# Patient Record
Sex: FEMALE | Race: WHITE | NOT HISPANIC OR LATINO | Employment: UNEMPLOYED | URBAN - METROPOLITAN AREA
[De-identification: names, ages, dates, MRNs, and addresses within clinical notes are randomized per-mention and may not be internally consistent; named-entity substitution may affect disease eponyms.]

---

## 2017-05-23 ENCOUNTER — HISTORICAL (OUTPATIENT)
Dept: INTERNAL MEDICINE | Facility: CLINIC | Age: 52
End: 2017-05-23

## 2022-04-07 ENCOUNTER — HISTORICAL (OUTPATIENT)
Dept: ADMINISTRATIVE | Facility: HOSPITAL | Age: 57
End: 2022-04-07

## 2022-04-24 VITALS
SYSTOLIC BLOOD PRESSURE: 127 MMHG | DIASTOLIC BLOOD PRESSURE: 81 MMHG | BODY MASS INDEX: 33.02 KG/M2 | HEIGHT: 62 IN | WEIGHT: 179.44 LBS

## 2022-10-13 ENCOUNTER — HOSPITAL ENCOUNTER (OUTPATIENT)
Facility: HOSPITAL | Age: 57
Discharge: HOME | DRG: 660 | End: 2022-10-15
Attending: STUDENT IN AN ORGANIZED HEALTH CARE EDUCATION/TRAINING PROGRAM | Admitting: INTERNAL MEDICINE
Payer: MEDICAID

## 2022-10-13 DIAGNOSIS — R11.2 NAUSEA AND VOMITING, UNSPECIFIED VOMITING TYPE: ICD-10-CM

## 2022-10-13 DIAGNOSIS — N20.0 KIDNEY STONE: ICD-10-CM

## 2022-10-13 DIAGNOSIS — R10.32 LEFT LOWER QUADRANT ABDOMINAL PAIN: ICD-10-CM

## 2022-10-13 DIAGNOSIS — K57.92 DIVERTICULITIS: Primary | ICD-10-CM

## 2022-10-13 LAB
ALBUMIN SERPL-MCNC: 4.2 GM/DL (ref 3.5–5)
ALBUMIN/GLOB SERPL: 1.2 RATIO (ref 1.1–2)
ALP SERPL-CCNC: 67 UNIT/L (ref 40–150)
ALT SERPL-CCNC: 24 UNIT/L (ref 0–55)
APPEARANCE UR: CLEAR
AST SERPL-CCNC: 22 UNIT/L (ref 5–34)
BACTERIA #/AREA URNS AUTO: ABNORMAL /HPF
BASOPHILS # BLD AUTO: 0.05 X10(3)/MCL (ref 0–0.2)
BASOPHILS NFR BLD AUTO: 0.4 %
BILIRUB UR QL STRIP.AUTO: NEGATIVE MG/DL
BILIRUBIN DIRECT+TOT PNL SERPL-MCNC: 0.6 MG/DL
BUN SERPL-MCNC: 12 MG/DL (ref 9.8–20.1)
CALCIUM SERPL-MCNC: 9.8 MG/DL (ref 8.4–10.2)
CHLORIDE SERPL-SCNC: 102 MMOL/L (ref 98–107)
CO2 SERPL-SCNC: 23 MMOL/L (ref 22–29)
COLOR UR AUTO: YELLOW
CREAT SERPL-MCNC: 0.63 MG/DL (ref 0.55–1.02)
EOSINOPHIL # BLD AUTO: 0.12 X10(3)/MCL (ref 0–0.9)
EOSINOPHIL NFR BLD AUTO: 1 %
ERYTHROCYTE [DISTWIDTH] IN BLOOD BY AUTOMATED COUNT: 12.7 % (ref 11.5–17)
GFR SERPLBLD CREATININE-BSD FMLA CKD-EPI: >60 MLS/MIN/1.73/M2
GLOBULIN SER-MCNC: 3.5 GM/DL (ref 2.4–3.5)
GLUCOSE SERPL-MCNC: 100 MG/DL (ref 74–100)
GLUCOSE UR QL STRIP.AUTO: NEGATIVE MG/DL
HCT VFR BLD AUTO: 43.2 % (ref 37–47)
HGB BLD-MCNC: 13.6 GM/DL (ref 12–16)
IMM GRANULOCYTES # BLD AUTO: 0.04 X10(3)/MCL (ref 0–0.04)
IMM GRANULOCYTES NFR BLD AUTO: 0.3 %
KETONES UR QL STRIP.AUTO: NEGATIVE MG/DL
LEUKOCYTE ESTERASE UR QL STRIP.AUTO: ABNORMAL UNIT/L
LIPASE SERPL-CCNC: 58 U/L
LYMPHOCYTES # BLD AUTO: 2.77 X10(3)/MCL (ref 0.6–4.6)
LYMPHOCYTES NFR BLD AUTO: 22.4 %
MCH RBC QN AUTO: 26.4 PG (ref 27–31)
MCHC RBC AUTO-ENTMCNC: 31.5 MG/DL (ref 33–36)
MCV RBC AUTO: 83.9 FL (ref 80–94)
MONOCYTES # BLD AUTO: 0.78 X10(3)/MCL (ref 0.1–1.3)
MONOCYTES NFR BLD AUTO: 6.3 %
NEUTROPHILS # BLD AUTO: 8.6 X10(3)/MCL (ref 2.1–9.2)
NEUTROPHILS NFR BLD AUTO: 69.6 %
NITRITE UR QL STRIP.AUTO: NEGATIVE
NRBC BLD AUTO-RTO: 0 %
PH UR STRIP.AUTO: 5.5 [PH]
PLATELET # BLD AUTO: 382 X10(3)/MCL (ref 130–400)
PMV BLD AUTO: 9 FL (ref 7.4–10.4)
POTASSIUM SERPL-SCNC: 4.4 MMOL/L (ref 3.5–5.1)
PROT SERPL-MCNC: 7.7 GM/DL (ref 6.4–8.3)
PROT UR QL STRIP.AUTO: NEGATIVE MG/DL
RBC # BLD AUTO: 5.15 X10(6)/MCL (ref 4.2–5.4)
RBC #/AREA URNS AUTO: <5 /HPF
RBC UR QL AUTO: ABNORMAL UNIT/L
SODIUM SERPL-SCNC: 137 MMOL/L (ref 136–145)
SP GR UR STRIP.AUTO: 1.01 (ref 1–1.03)
SQUAMOUS #/AREA URNS AUTO: <5 /HPF
UROBILINOGEN UR STRIP-ACNC: 0.2 MG/DL
WBC # SPEC AUTO: 12.4 X10(3)/MCL (ref 4.5–11.5)
WBC #/AREA URNS AUTO: 6 /HPF

## 2022-10-13 PROCEDURE — 36415 COLL VENOUS BLD VENIPUNCTURE: CPT | Performed by: EMERGENCY MEDICINE

## 2022-10-13 PROCEDURE — 96361 HYDRATE IV INFUSION ADD-ON: CPT

## 2022-10-13 PROCEDURE — 81001 URINALYSIS AUTO W/SCOPE: CPT | Performed by: EMERGENCY MEDICINE

## 2022-10-13 PROCEDURE — 83690 ASSAY OF LIPASE: CPT | Performed by: PHYSICIAN ASSISTANT

## 2022-10-13 PROCEDURE — 96376 TX/PRO/DX INJ SAME DRUG ADON: CPT

## 2022-10-13 PROCEDURE — 85025 COMPLETE CBC W/AUTO DIFF WBC: CPT | Performed by: EMERGENCY MEDICINE

## 2022-10-13 PROCEDURE — G0378 HOSPITAL OBSERVATION PER HR: HCPCS

## 2022-10-13 PROCEDURE — 96365 THER/PROPH/DIAG IV INF INIT: CPT

## 2022-10-13 PROCEDURE — 80053 COMPREHEN METABOLIC PANEL: CPT | Performed by: EMERGENCY MEDICINE

## 2022-10-13 PROCEDURE — 96375 TX/PRO/DX INJ NEW DRUG ADDON: CPT

## 2022-10-13 PROCEDURE — 96368 THER/DIAG CONCURRENT INF: CPT

## 2022-10-13 PROCEDURE — 99285 EMERGENCY DEPT VISIT HI MDM: CPT | Mod: 25

## 2022-10-13 PROCEDURE — 11000001 HC ACUTE MED/SURG PRIVATE ROOM

## 2022-10-14 ENCOUNTER — ANESTHESIA EVENT (OUTPATIENT)
Dept: SURGERY | Facility: HOSPITAL | Age: 57
DRG: 660 | End: 2022-10-14
Payer: MEDICAID

## 2022-10-14 ENCOUNTER — ANESTHESIA (OUTPATIENT)
Dept: SURGERY | Facility: HOSPITAL | Age: 57
DRG: 660 | End: 2022-10-14
Payer: MEDICAID

## 2022-10-14 PROBLEM — N20.0 KIDNEY STONE: Status: ACTIVE | Noted: 2022-10-14

## 2022-10-14 LAB — SARS-COV-2 RDRP RESP QL NAA+PROBE: NEGATIVE

## 2022-10-14 PROCEDURE — 25500020 PHARM REV CODE 255: Performed by: STUDENT IN AN ORGANIZED HEALTH CARE EDUCATION/TRAINING PROGRAM

## 2022-10-14 PROCEDURE — 11000001 HC ACUTE MED/SURG PRIVATE ROOM

## 2022-10-14 PROCEDURE — C2617 STENT, NON-COR, TEM W/O DEL: HCPCS | Performed by: UROLOGY

## 2022-10-14 PROCEDURE — S0030 INJECTION, METRONIDAZOLE: HCPCS | Performed by: PHYSICIAN ASSISTANT

## 2022-10-14 PROCEDURE — 36000707: Performed by: UROLOGY

## 2022-10-14 PROCEDURE — 63600175 PHARM REV CODE 636 W HCPCS

## 2022-10-14 PROCEDURE — 99900035 HC TECH TIME PER 15 MIN (STAT)

## 2022-10-14 PROCEDURE — 25000003 PHARM REV CODE 250

## 2022-10-14 PROCEDURE — 87635 SARS-COV-2 COVID-19 AMP PRB: CPT | Performed by: INTERNAL MEDICINE

## 2022-10-14 PROCEDURE — C1758 CATHETER, URETERAL: HCPCS | Performed by: UROLOGY

## 2022-10-14 PROCEDURE — C1769 GUIDE WIRE: HCPCS | Performed by: UROLOGY

## 2022-10-14 PROCEDURE — 00910 ANES TRANSURETHRAL PX NOS: CPT | Performed by: UROLOGY

## 2022-10-14 PROCEDURE — 63600175 PHARM REV CODE 636 W HCPCS: Performed by: STUDENT IN AN ORGANIZED HEALTH CARE EDUCATION/TRAINING PROGRAM

## 2022-10-14 PROCEDURE — 71000033 HC RECOVERY, INTIAL HOUR: Performed by: UROLOGY

## 2022-10-14 PROCEDURE — 94761 N-INVAS EAR/PLS OXIMETRY MLT: CPT

## 2022-10-14 PROCEDURE — 25000003 PHARM REV CODE 250: Performed by: PHYSICIAN ASSISTANT

## 2022-10-14 PROCEDURE — S0030 INJECTION, METRONIDAZOLE: HCPCS | Performed by: STUDENT IN AN ORGANIZED HEALTH CARE EDUCATION/TRAINING PROGRAM

## 2022-10-14 PROCEDURE — 25000003 PHARM REV CODE 250: Performed by: STUDENT IN AN ORGANIZED HEALTH CARE EDUCATION/TRAINING PROGRAM

## 2022-10-14 PROCEDURE — 25000242 PHARM REV CODE 250 ALT 637 W/ HCPCS: Performed by: PHYSICIAN ASSISTANT

## 2022-10-14 PROCEDURE — 96361 HYDRATE IV INFUSION ADD-ON: CPT

## 2022-10-14 PROCEDURE — S0030 INJECTION, METRONIDAZOLE: HCPCS

## 2022-10-14 PROCEDURE — 63600175 PHARM REV CODE 636 W HCPCS: Performed by: PHYSICIAN ASSISTANT

## 2022-10-14 PROCEDURE — G0378 HOSPITAL OBSERVATION PER HR: HCPCS

## 2022-10-14 PROCEDURE — 96366 THER/PROPH/DIAG IV INF ADDON: CPT

## 2022-10-14 PROCEDURE — 37000009 HC ANESTHESIA EA ADD 15 MINS: Performed by: UROLOGY

## 2022-10-14 PROCEDURE — 96376 TX/PRO/DX INJ SAME DRUG ADON: CPT | Mod: 59

## 2022-10-14 PROCEDURE — 36000706: Performed by: UROLOGY

## 2022-10-14 PROCEDURE — 94640 AIRWAY INHALATION TREATMENT: CPT

## 2022-10-14 PROCEDURE — 37000008 HC ANESTHESIA 1ST 15 MINUTES: Performed by: UROLOGY

## 2022-10-14 DEVICE — STENT SET URETERAL 6X26CM: Type: IMPLANTABLE DEVICE | Site: URETER | Status: FUNCTIONAL

## 2022-10-14 DEVICE — STENT SET URETERAL 6FR 22CM: Type: IMPLANTABLE DEVICE | Site: URETER | Status: FUNCTIONAL

## 2022-10-14 RX ORDER — PHENYLEPHRINE HCL IN 0.9% NACL 1 MG/10 ML
SYRINGE (ML) INTRAVENOUS
Status: DISCONTINUED | OUTPATIENT
Start: 2022-10-14 | End: 2022-10-14

## 2022-10-14 RX ORDER — MORPHINE SULFATE 4 MG/ML
4 INJECTION, SOLUTION INTRAMUSCULAR; INTRAVENOUS
Status: COMPLETED | OUTPATIENT
Start: 2022-10-14 | End: 2022-10-14

## 2022-10-14 RX ORDER — ONDANSETRON 2 MG/ML
4 INJECTION INTRAMUSCULAR; INTRAVENOUS
Status: COMPLETED | OUTPATIENT
Start: 2022-10-14 | End: 2022-10-14

## 2022-10-14 RX ORDER — KETOROLAC TROMETHAMINE 30 MG/ML
30 INJECTION, SOLUTION INTRAMUSCULAR; INTRAVENOUS EVERY 6 HOURS PRN
Status: DISCONTINUED | OUTPATIENT
Start: 2022-10-14 | End: 2022-10-15 | Stop reason: HOSPADM

## 2022-10-14 RX ORDER — MORPHINE SULFATE 4 MG/ML
2 INJECTION, SOLUTION INTRAMUSCULAR; INTRAVENOUS EVERY 4 HOURS PRN
Status: DISCONTINUED | OUTPATIENT
Start: 2022-10-14 | End: 2022-10-15 | Stop reason: HOSPADM

## 2022-10-14 RX ORDER — ONDANSETRON 2 MG/ML
INJECTION INTRAMUSCULAR; INTRAVENOUS
Status: COMPLETED
Start: 2022-10-14 | End: 2022-10-14

## 2022-10-14 RX ORDER — LIDOCAINE HYDROCHLORIDE 20 MG/ML
INJECTION, SOLUTION EPIDURAL; INFILTRATION; INTRACAUDAL; PERINEURAL
Status: DISCONTINUED | OUTPATIENT
Start: 2022-10-14 | End: 2022-10-14

## 2022-10-14 RX ORDER — CIPROFLOXACIN 2 MG/ML
400 INJECTION, SOLUTION INTRAVENOUS
Status: COMPLETED | OUTPATIENT
Start: 2022-10-14 | End: 2022-10-14

## 2022-10-14 RX ORDER — PROMETHAZINE HYDROCHLORIDE 25 MG/ML
12.5 INJECTION, SOLUTION INTRAMUSCULAR; INTRAVENOUS
Status: COMPLETED | OUTPATIENT
Start: 2022-10-14 | End: 2022-10-14

## 2022-10-14 RX ORDER — IPRATROPIUM BROMIDE AND ALBUTEROL SULFATE 2.5; .5 MG/3ML; MG/3ML
3 SOLUTION RESPIRATORY (INHALATION) ONCE
Status: COMPLETED | OUTPATIENT
Start: 2022-10-14 | End: 2022-10-14

## 2022-10-14 RX ORDER — SODIUM CHLORIDE 0.9 % (FLUSH) 0.9 %
10 SYRINGE (ML) INJECTION EVERY 12 HOURS PRN
Status: DISCONTINUED | OUTPATIENT
Start: 2022-10-14 | End: 2022-10-15 | Stop reason: HOSPADM

## 2022-10-14 RX ORDER — PHENAZOPYRIDINE HYDROCHLORIDE 100 MG/1
100 TABLET, FILM COATED ORAL
Status: CANCELLED | OUTPATIENT
Start: 2022-10-14

## 2022-10-14 RX ORDER — METRONIDAZOLE 500 MG/100ML
500 INJECTION, SOLUTION INTRAVENOUS
Status: COMPLETED | OUTPATIENT
Start: 2022-10-14 | End: 2022-10-14

## 2022-10-14 RX ORDER — IBUPROFEN 200 MG
24 TABLET ORAL
Status: DISCONTINUED | OUTPATIENT
Start: 2022-10-14 | End: 2022-10-15 | Stop reason: HOSPADM

## 2022-10-14 RX ORDER — METRONIDAZOLE 500 MG/100ML
500 INJECTION, SOLUTION INTRAVENOUS EVERY 8 HOURS
Status: DISCONTINUED | OUTPATIENT
Start: 2022-10-14 | End: 2022-10-14

## 2022-10-14 RX ORDER — SODIUM CHLORIDE 9 MG/ML
INJECTION, SOLUTION INTRAVENOUS CONTINUOUS
Status: DISCONTINUED | OUTPATIENT
Start: 2022-10-14 | End: 2022-10-15 | Stop reason: HOSPADM

## 2022-10-14 RX ORDER — METRONIDAZOLE 500 MG/100ML
INJECTION, SOLUTION INTRAVENOUS
Status: DISCONTINUED | OUTPATIENT
Start: 2022-10-14 | End: 2022-10-14

## 2022-10-14 RX ORDER — GLUCAGON 1 MG
1 KIT INJECTION
Status: DISCONTINUED | OUTPATIENT
Start: 2022-10-14 | End: 2022-10-15 | Stop reason: HOSPADM

## 2022-10-14 RX ORDER — CIPROFLOXACIN 2 MG/ML
400 INJECTION, SOLUTION INTRAVENOUS
Status: DISCONTINUED | OUTPATIENT
Start: 2022-10-14 | End: 2022-10-15 | Stop reason: HOSPADM

## 2022-10-14 RX ORDER — TAMSULOSIN HYDROCHLORIDE 0.4 MG/1
0.4 CAPSULE ORAL DAILY
Status: CANCELLED | OUTPATIENT
Start: 2022-10-14

## 2022-10-14 RX ORDER — ACETAMINOPHEN 325 MG/1
650 TABLET ORAL EVERY 8 HOURS PRN
Status: DISCONTINUED | OUTPATIENT
Start: 2022-10-14 | End: 2022-10-15 | Stop reason: HOSPADM

## 2022-10-14 RX ORDER — ACETAMINOPHEN 325 MG/1
650 TABLET ORAL EVERY 4 HOURS PRN
Status: DISCONTINUED | OUTPATIENT
Start: 2022-10-14 | End: 2022-10-15 | Stop reason: HOSPADM

## 2022-10-14 RX ORDER — METRONIDAZOLE 500 MG/100ML
500 INJECTION, SOLUTION INTRAVENOUS EVERY 8 HOURS
Status: DISCONTINUED | OUTPATIENT
Start: 2022-10-14 | End: 2022-10-15 | Stop reason: HOSPADM

## 2022-10-14 RX ORDER — MIDAZOLAM HYDROCHLORIDE 1 MG/ML
INJECTION INTRAMUSCULAR; INTRAVENOUS
Status: DISCONTINUED | OUTPATIENT
Start: 2022-10-14 | End: 2022-10-14

## 2022-10-14 RX ORDER — HYOSCYAMINE SULFATE 0.12 MG/1
0.12 TABLET SUBLINGUAL EVERY 4 HOURS PRN
Status: CANCELLED | OUTPATIENT
Start: 2022-10-14

## 2022-10-14 RX ORDER — FENTANYL CITRATE 50 UG/ML
INJECTION, SOLUTION INTRAMUSCULAR; INTRAVENOUS
Status: DISCONTINUED | OUTPATIENT
Start: 2022-10-14 | End: 2022-10-14

## 2022-10-14 RX ORDER — PROPOFOL 10 MG/ML
VIAL (ML) INTRAVENOUS
Status: DISCONTINUED | OUTPATIENT
Start: 2022-10-14 | End: 2022-10-14

## 2022-10-14 RX ORDER — ONDANSETRON 2 MG/ML
4 INJECTION INTRAMUSCULAR; INTRAVENOUS EVERY 4 HOURS PRN
Status: DISCONTINUED | OUTPATIENT
Start: 2022-10-14 | End: 2022-10-15 | Stop reason: HOSPADM

## 2022-10-14 RX ORDER — IBUPROFEN 200 MG
16 TABLET ORAL
Status: DISCONTINUED | OUTPATIENT
Start: 2022-10-14 | End: 2022-10-15 | Stop reason: HOSPADM

## 2022-10-14 RX ADMIN — SODIUM CHLORIDE, POTASSIUM CHLORIDE, SODIUM LACTATE AND CALCIUM CHLORIDE 1000 ML: 600; 310; 30; 20 INJECTION, SOLUTION INTRAVENOUS at 03:10

## 2022-10-14 RX ADMIN — PROPOFOL 150 MG: 10 INJECTION, EMULSION INTRAVENOUS at 01:10

## 2022-10-14 RX ADMIN — PROMETHAZINE HYDROCHLORIDE 12.5 MG: 25 INJECTION INTRAMUSCULAR; INTRAVENOUS at 07:10

## 2022-10-14 RX ADMIN — ONDANSETRON 4 MG: 2 INJECTION INTRAMUSCULAR; INTRAVENOUS at 02:10

## 2022-10-14 RX ADMIN — ONDANSETRON 4 MG: 2 INJECTION INTRAMUSCULAR; INTRAVENOUS at 03:10

## 2022-10-14 RX ADMIN — PROPOFOL 50 MG: 10 INJECTION, EMULSION INTRAVENOUS at 01:10

## 2022-10-14 RX ADMIN — SODIUM CHLORIDE, SODIUM GLUCONATE, SODIUM ACETATE, POTASSIUM CHLORIDE AND MAGNESIUM CHLORIDE: 526; 502; 368; 37; 30 INJECTION, SOLUTION INTRAVENOUS at 01:10

## 2022-10-14 RX ADMIN — MORPHINE SULFATE 2 MG: 4 INJECTION INTRAVENOUS at 03:10

## 2022-10-14 RX ADMIN — KETOROLAC TROMETHAMINE 30 MG: 30 INJECTION, SOLUTION INTRAMUSCULAR at 05:10

## 2022-10-14 RX ADMIN — Medication 100 MCG: at 01:10

## 2022-10-14 RX ADMIN — METRONIDAZOLE 500 MG: 500 INJECTION, SOLUTION INTRAVENOUS at 03:10

## 2022-10-14 RX ADMIN — SODIUM CHLORIDE: 9 INJECTION, SOLUTION INTRAVENOUS at 05:10

## 2022-10-14 RX ADMIN — FENTANYL CITRATE 25 MCG: 50 INJECTION, SOLUTION INTRAMUSCULAR; INTRAVENOUS at 02:10

## 2022-10-14 RX ADMIN — MIDAZOLAM HYDROCHLORIDE 2 MG: 1 INJECTION, SOLUTION INTRAMUSCULAR; INTRAVENOUS at 01:10

## 2022-10-14 RX ADMIN — KETOROLAC TROMETHAMINE 30 MG: 30 INJECTION, SOLUTION INTRAMUSCULAR at 12:10

## 2022-10-14 RX ADMIN — LIDOCAINE HYDROCHLORIDE 40 MG: 20 INJECTION, SOLUTION EPIDURAL; INFILTRATION; INTRACAUDAL; PERINEURAL at 01:10

## 2022-10-14 RX ADMIN — MORPHINE SULFATE 4 MG: 4 INJECTION INTRAVENOUS at 03:10

## 2022-10-14 RX ADMIN — FENTANYL CITRATE 25 MCG: 50 INJECTION, SOLUTION INTRAMUSCULAR; INTRAVENOUS at 01:10

## 2022-10-14 RX ADMIN — IPRATROPIUM BROMIDE AND ALBUTEROL SULFATE 3 ML: 2.5; .5 SOLUTION RESPIRATORY (INHALATION) at 10:10

## 2022-10-14 RX ADMIN — ONDANSETRON 4 MG: 2 INJECTION INTRAMUSCULAR; INTRAVENOUS at 06:10

## 2022-10-14 RX ADMIN — ONDANSETRON 4 MG: 2 INJECTION INTRAMUSCULAR; INTRAVENOUS at 07:10

## 2022-10-14 RX ADMIN — METRONIDAZOLE 500 MG: 5 INJECTION, SOLUTION INTRAVENOUS at 08:10

## 2022-10-14 RX ADMIN — CIPROFLOXACIN 400 MG: 2 INJECTION, SOLUTION INTRAVENOUS at 03:10

## 2022-10-14 RX ADMIN — IOPAMIDOL 100 ML: 755 INJECTION, SOLUTION INTRAVENOUS at 01:10

## 2022-10-14 RX ADMIN — MORPHINE SULFATE 4 MG: 4 INJECTION INTRAVENOUS at 06:10

## 2022-10-14 RX ADMIN — METRONIDAZOLE 500 MG: 500 INJECTION, SOLUTION INTRAVENOUS at 01:10

## 2022-10-14 RX ADMIN — SODIUM CHLORIDE: 9 INJECTION, SOLUTION INTRAVENOUS at 07:10

## 2022-10-14 NOTE — CONSULTS
Michelle Foote 1965  [id number]  10/14/2022    CONSULTING PHYSICIAN: Apollo    Reason for consult: Obstructing ureteral stone    HPI: Ms. Foote is a 57 yo female with a past medical history of kidney stones and diverticulosis presents to the emergency department complaints of abdominal pain x1 week. Abdominal pain radiates from left to right. Reports associated nausea and vomiting. Denies known fever, chills, gross hematuria or dysuria. Labs on arrival; WBC 12.4, H&H 13.6 & 43.2, BUN & creatinine 12 & 0.63, UA shows clear yellow urine, +1 occult blood, +1 leukocytes, 6 WBC's and no bacteria seen. Abdominal/ pelvic CT shows a 12 x 8 mm stone in the proximal to mid ureter and a 9 mm stone in the upper pole of the renal pelvis causing mild hydronephrosis.      Past Medical History:   Diagnosis Date    Diverticular disease of large intestine without perforation or abscess      No past surgical history on file.    Family history reviewed, non contributory       Current Facility-Administered Medications   Medication Dose Route Frequency Provider Last Rate Last Admin    0.9%  NaCl infusion   Intravenous Continuous Chiqui Ramírez PA-C 100 mL/hr at 10/14/22 0755 New Bag at 10/14/22 0755    acetaminophen tablet 650 mg  650 mg Oral Q8H PRN Chiqui Ramírez PA-C        acetaminophen tablet 650 mg  650 mg Oral Q4H PRN Chiqui Ramírez PA-C        ciprofloxacin (CIPRO)400mg/200ml D5W IVPB 400 mg  400 mg Intravenous Q12H Chiqui Ramírez PA-C        dextrose 10% bolus 125 mL  12.5 g Intravenous PRN Chiqui Ramírez PA-C        dextrose 10% bolus 250 mL  25 g Intravenous PRN Chiqui Ramírez PA-C        glucagon (human recombinant) injection 1 mg  1 mg Intramuscular PRN Chiqui Ramírez PA-C        glucose chewable tablet 16 g  16 g Oral PRN GERSON Marques-GRIFFIN        glucose chewable tablet 24 g  24 g Oral PRN Chiqui Ramírez PA-C        metronidazole IVPB 500 mg  500 mg Intravenous Q8H Chiqui Ramírez PA-C         ondansetron injection 4 mg  4 mg Intravenous Q4H PRN Chiqui Ramírez PA-C        sodium chloride 0.9% flush 10 mL  10 mL Intravenous Q12H PRN Chiqui Ramírez PA-C         No current outpatient medications on file.     Review of patient's allergies indicates:   Allergen Reactions    Rifampin Hives     ROS: 12 point review of systems negative other than the HPI    PHYSICAL EXAM:  Vitals:    10/14/22 0300 10/14/22 0456 10/14/22 0606 10/14/22 0645   BP:  136/68 125/75    Pulse:  83 86    Resp: 18   18   Temp:       SpO2:  100% 99%    Weight:       Height:           Intake/Output Summary (Last 24 hours) at 10/14/2022 0914  Last data filed at 10/14/2022 0751  Gross per 24 hour   Intake 0.5 ml   Output --   Net 0.5 ml       GEN: WN/WD NAD  HEENT: NCAT, PERRLA, EOMI, OP clear, nares patent  CV: RRR  RESP: Even and unlabored  ABD: soft, ND, tender  : left CVA tenderness  EXT: no C/C/E  NEURO: no focal deficits, MAEW, AAOx4      LABS:  Recent Results (from the past 24 hour(s))   Urinalysis, Reflex to Urine Culture Urine, Clean Catch    Collection Time: 10/13/22  7:59 PM    Specimen: Urine   Result Value Ref Range    Color, UA Yellow Yellow, Light-Yellow, Dark Yellow, Charmaine, Straw    Appearance, UA Clear Clear    Specific Gravity, UA 1.010 1.001 - 1.030    pH, UA 5.5 5.0, 5.5, 6.0, 6.5, 7.0, 7.5, 8.0, 8.5    Protein, UA Negative Negative mg/dL    Glucose, UA Negative Negative, Normal mg/dL    Ketones, UA Negative Negative mg/dL    Blood, UA 1+ (A) Negative unit/L    Bilirubin, UA Negative Negative mg/dL    Urobilinogen, UA 0.2 0.2, 1.0, Normal mg/dL    Nitrites, UA Negative Negative    Leukocyte Esterase, UA 1+ (A) Negative unit/L   Urinalysis, Microscopic    Collection Time: 10/13/22  7:59 PM   Result Value Ref Range    RBC, UA <5 <=5 /HPF    WBC, UA 6 (H) <=5 /HPF    Squamous Epithelial Cells, UA <5 <=5 /HPF    Bacteria, UA None Seen None Seen, Rare, Occasional /HPF   Comprehensive metabolic panel    Collection  Time: 10/13/22  8:14 PM   Result Value Ref Range    Sodium Level 137 136 - 145 mmol/L    Potassium Level 4.4 3.5 - 5.1 mmol/L    Chloride 102 98 - 107 mmol/L    Carbon Dioxide 23 22 - 29 mmol/L    Glucose Level 100 74 - 100 mg/dL    Blood Urea Nitrogen 12.0 9.8 - 20.1 mg/dL    Creatinine 0.63 0.55 - 1.02 mg/dL    Calcium Level Total 9.8 8.4 - 10.2 mg/dL    Protein Total 7.7 6.4 - 8.3 gm/dL    Albumin Level 4.2 3.5 - 5.0 gm/dL    Globulin 3.5 2.4 - 3.5 gm/dL    Albumin/Globulin Ratio 1.2 1.1 - 2.0 ratio    Bilirubin Total 0.6 <=1.5 mg/dL    Alkaline Phosphatase 67 40 - 150 unit/L    Alanine Aminotransferase 24 0 - 55 unit/L    Aspartate Aminotransferase 22 5 - 34 unit/L    eGFR >60 mls/min/1.73/m2   CBC with Differential    Collection Time: 10/13/22  8:14 PM   Result Value Ref Range    WBC 12.4 (H) 4.5 - 11.5 x10(3)/mcL    RBC 5.15 4.20 - 5.40 x10(6)/mcL    Hgb 13.6 12.0 - 16.0 gm/dL    Hct 43.2 37.0 - 47.0 %    MCV 83.9 80.0 - 94.0 fL    MCH 26.4 (L) 27.0 - 31.0 pg    MCHC 31.5 (L) 33.0 - 36.0 mg/dL    RDW 12.7 11.5 - 17.0 %    Platelet 382 130 - 400 x10(3)/mcL    MPV 9.0 7.4 - 10.4 fL    Neut % 69.6 %    Lymph % 22.4 %    Mono % 6.3 %    Eos % 1.0 %    Basophil % 0.4 %    Lymph # 2.77 0.6 - 4.6 x10(3)/mcL    Neut # 8.6 2.1 - 9.2 x10(3)/mcL    Mono # 0.78 0.1 - 1.3 x10(3)/mcL    Eos # 0.12 0 - 0.9 x10(3)/mcL    Baso # 0.05 0 - 0.2 x10(3)/mcL    IG# 0.04 0 - 0.04 x10(3)/mcL    IG% 0.3 %    NRBC% 0.0 %   Lipase    Collection Time: 10/13/22  8:14 PM   Result Value Ref Range    Lipase Level 58 <=60 U/L         IMAGING:  FINDINGS:  Liver/biliary: Mild generalized hepatic steatosis.  No radiodense gallstones. No intra or extrahepatic biliary ductal dilation.     Pancreas: Normal.     Spleen: Normal.     Adrenals: Normal.     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  A stone in the proximal to midportion of the left ureter measures 12 mm in length and 8 mm in transverse diameter.   There is a 9 mm stone in the left upper pole renal pelvis.  These result in mild left hydronephrosis.  Few other nonobstructing renal stones bilaterally.  Bladder decompressed and not well evaluated.  Uterus not seen.  No adnexal mass.     Stomach/bowel: No evidence of bowel obstruction. Normal appendix. Colonic diverticulosis with mild wall thickening and pericolonic stranding along a short segment descending colon.  There is an additional small site of less extensive inflammation involving a diverticulum along the sigmoid colon (series 4, image 71).     Lymph nodes/peritoneum: No pathologically enlarged lymph node identified. Trace ascites near the inflamed segment of descending colon.  No pneumoperitoneum or drainable fluid collection.     Vasculature: Normal abdominal aortic caliber.     Abdominal wall: Minimal postsurgical change in the lower anterior abdominal wall.     Lung bases: No consolidation or pleural effusion.     Musculoskeletal: No acute osseous findings. Interbody implants at L4-5 and L5-S1.     Impression:  1. Two suspected areas of uncomplicated colonic diverticulitis.  2. Two left renal pelvis/ureteral stones with mild left hydronephrosis.  No significant discrepancy between my interpretation and the preliminary radiology report.         ASSESSMENT:  Two large left ureteral stones causing hydronephrosis      PLAN:  NPO  Pain and nausea control  Discussed diagnosis, treatment options, risks and benefits. Patient is agreeable to cysto with left ureteral stent placement this afternoon. Informed consent obtained. Discussed normal post operative findings with ureteral stent in place.     EDITH Cancino

## 2022-10-14 NOTE — ANESTHESIA POSTPROCEDURE EVALUATION
Anesthesia Post Evaluation    Patient: Michelle Foote    Procedure(s) Performed: Procedure(s) (LRB):  CYSTOSCOPY, WITH URETERAL STENT INSERTION (N/A)    Final Anesthesia Type: general      Patient location during evaluation: PACU  Patient participation: Yes- Able to Participate  Level of consciousness: awake and alert and oriented  Post-procedure vital signs: reviewed and stable  Pain management: adequate  Airway patency: patent    PONV status at discharge: No PONV  Anesthetic complications: no      Cardiovascular status: hemodynamically stable  Respiratory status: unassisted  Hydration status: euvolemic  Follow-up not needed.          Vitals Value Taken Time   /78 10/14/22 1521   Temp 36.7 °C (98.1 °F) 10/14/22 1430   Pulse 76 10/14/22 1521   Resp 17 10/14/22 1521   SpO2 98 % 10/14/22 1521   Vitals shown include unvalidated device data.      No case tracking events are documented in the log.      Pain/Cass Score: Pain Rating Prior to Med Admin: 8 (10/14/2022  3:00 PM)  Cass Score: 9 (10/14/2022  2:26 PM)         hx of numbness/ tingling fingers  bilateral hands

## 2022-10-14 NOTE — OP NOTE
[facility] - Periop Services  Surgery Department  Operative Note    SUMMARY     Patient Name: Michelle Foote     : 1965     Date of the surgery: 10/13/2022 - 10/14/2022     Location of surgery: [facility] *         Date of Procedure: 10/14/2022     Procedure: Procedure(s) (LRB):  CYSTOSCOPY, WITH URETERAL STENT INSERTION (N/A)     Surgeon(s) and Role:     * Adnerson Nova MD - Primary    Assisting Surgeon: None        Pre-Operative Diagnosis: Calculus of kidney [N20.0]    Post-Operative Diagnosis: Post-Op Diagnosis Codes:     * Calculus of kidney [N20.0]    Operations/ Therapeutic procedures:  1.  Cystoscopy with left double-J stent placement, lower pole moiety  Two.  Cystoscopy with left double-J stent placement, upper pole moiety    Assisting Surgeon: None    Estimated Blood Loss (EBL): * No values recorded between 10/14/2022  2:05 PM and 10/14/2022  2:27 PM *           Anesthesia: General    Complications:  None    History of Clinical Illness:  56-year-old female history of kidney stones admitted through the emergency department with concerns of diverticulitis and an obstructing ureteral stone.  Patient was evaluated and her images were reviewed.  She was consented for operative intervention and stent placement.    Operative note:  After informed consent was obtained including the risks and benefits of the procedure patient was transported to the operating theater and placed in the supine position on the operating table.  Once general endotracheal anesthesia was initiated patient was put in the dorsal lithotomy position with all bony surfaces appropriately padded and positioned.  Patient did receive preoperative antibiotics.  A time-out was undertaken to assure the proper patient and procedure.    Fluoroscopy was used to image the retroperitoneum.  Clearly we could see a calcification that appeared to be at the left UPJ as well as near the left upper pole.  We introduced a  22 Italian rigid cystoscope per the urethra into the bladder.  Bladder was drained and then filled.  Full cystoscopic examination was performed.  On the right side patient had a solitary ureteral orifice.  On the left side the patient had the appearance of a duplicated ureteral orifice with 2 ureteral meatus at the left trigone.  The remainder of the bladder was normal with no foreign bodies tumors or stones.    We initially cannulated the superior lateral orifice performed a retrograde pyelogram.  Ureter was thin and delicate all way to the proximal ureter where there was clear obstruction from a large proximal ureteral stone.  Contrast moved past that stone filled out the lower and mid pole calices.  I was able get a wire past that stone.  Measured the length of the ureter.  Advanced and deployed a 6 Italian by 22 cm double-J stent.  When in appropriate position the wire was removed deploying the stent in place.  Fluoroscopy showed a good coil in the pelvis of the lower and mid pole calices and direct vision showed a good coil in the bladder.  Immediately I could see contrast emanating from the holes of the stent suggesting relief of obstruction.    I then cannulated the inferior medial orifice performed a retrograde pyelogram.  The ureter was thin and delicate all the way up to the larger stone which was blocking the infundibulum of the upper calyx.  I was able get a wire past that stone into the upper tract.  I measured the length of the ureter.  I advanced and deployed a 6 Italian by 26 cm double-J stent in standard fashion.  When in appropriate position the wire was removed deploying the stent in place.  Fluoroscopy showed a good coil in the upper pole calyx.  Direct vision showed a good coil in the bladder.  Immediately I could see contrast emanating from the holes of the stent suggesting relief of obstruction.    At this point the bladder was drained.  Cystoscope was removed.  Patient was awoke from anesthesia  and transported to the recovery room in stable condition.      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 Item Serial No.  Lot No. LRB No. Used Action   STENT SET URETERAL 6FR 22CM - [id number]  STENT SET URETERAL 6FR 22CM  [employer] [id number] Left 1 Implanted   STENT SET URETERAL 6X26CM - [id number]  STENT SET URETERAL 6X26CM  [employer] [id number] Left 1 Implanted       Specimens:   Specimen (24h ago, onward)      None                    Condition: Good    Disposition: PACU - hemodynamically stable.

## 2022-10-14 NOTE — FIRST PROVIDER EVALUATION
"Medical screening examination initiated.  I have conducted a focused provider triage encounter, findings are as follows:    Brief history of present illness:  56 y.o. female presents to the E.D. with c/o left sided abdominal pain w/ stool changes. Patient denies dysuria, hematuria. Patient admits to history of diverticulitis and kidney stones. Patient denies any fever.      Vitals:    10/13/22 1956   BP: (!) 190/76   Pulse: 97   Resp: 14   Temp: 99.1 °F (37.3 °C)   SpO2: 97%   Weight: 73.9 kg (163 lb)   Height: 5' 2" (1.575 m)       Pertinent physical exam:  Awake, Alert, Oriented, Non labored breathing       Brief workup plan:  labs, u/a     Preliminary workup initiated; this workup will be continued and followed by the physician or advanced practice provider that is assigned to the patient when roomed.  "

## 2022-10-14 NOTE — ANESTHESIA PROCEDURE NOTES
Intubation    Date/Time: 10/14/2022 1:47 PM  Performed by: Keila Balderas  Authorized by: Nirmal Lopez MD     Intubation:     Induction:  Intravenous    Intubated:  Postinduction    Mask Ventilation:  Easy mask    Attempts:  1    Attempted By:  Student    Difficult Airway Encountered?: No      Complications:  None    Airway Device:  Supraglottic airway/LMA    Airway Device Size:  4.0    Style/Cuff Inflation:  Cuffed (inflated to minimal occlusive pressure)    Placement Verified By:  Capnometry    Complicating Factors:  None    Findings Post-Intubation:  BS equal bilateral  Notes:      Cuff pressure checked

## 2022-10-14 NOTE — ED PROVIDER NOTES
Encounter Date: 10/13/2022    SCRIBE #1 NOTE: I, Asher Brooks, am scribing for, and in the presence of,  Justin Bustillos MD. I have scribed the following portions of the note - Other sections scribed: hpi, ros, pe.     History     Chief Complaint   Patient presents with    Abdominal Pain     Hx diverticulitis and kidney stones. States x 1 week. States left sided sharp pain that has now travelled to right side. States urinary retention. States bowel movements are dark and more odorous than usual. States migraine. States no intake x 4 days except for beef broth.      55 y/o female with history of Diverticulosis and kidney stones presenting to the ED complaining of abdominal pain onset 6 days ago. Patient describes pain as sharp and radiates from the left to the right side of abdomen. She also complains of not eating since pain started, trouble walking due to pain, migraine, urinary retention, and diarrhea with dark and malodorous stool. She reports pain eases when she lies on her left side but worsens when she lies on her right side. She denies having a colonoscopy. She does not currently have a PCP, Gastroenterologist, or urologist.     The history is provided by the patient. No  was used.   Abdominal Pain  The current episode started several days ago. The onset of the illness was abrupt. The problem has been gradually worsening. The abdominal pain is located in the LLQ. The abdominal pain radiates to the RLQ. The abdominal pain is relieved by nothing. The abdominal pain is exacerbated by certain positions. The other symptoms of the illness include diarrhea. The other symptoms of the illness do not include fever, shortness of breath or dysuria.   The diarrhea began 6 to 7 days ago. The diarrhea is malodorous (Dark colored). The diarrhea occurs continuously.   The patient states that she believes she is currently not pregnant. Significant associated medical issues include diverticulitis.    Review of patient's allergies indicates:   Allergen Reactions    Rifampin Hives    Latex, natural rubber Rash     Past Medical History:   Diagnosis Date    Diverticular disease of large intestine without perforation or abscess      Past Surgical History:   Procedure Laterality Date    CYSTOSCOPY W/ URETERAL STENT PLACEMENT N/A 10/14/2022    Procedure: CYSTOSCOPY, WITH URETERAL STENT INSERTION;  Surgeon: Anderson Nova MD;  Location: [facility];  Service: Urology;  Laterality: N/A;     History reviewed. No pertinent family history.  Social History     Tobacco Use    Smoking status: Never    Smokeless tobacco: Never   Substance Use Topics    Alcohol use: Yes     Alcohol/week: 2.0 standard drinks     Types: 1 Glasses of wine, 1 Cans of beer per week     Comment: social drink    Drug use: Never     Review of Systems   Constitutional:  Positive for appetite change. Negative for fever.   HENT:  Negative for sore throat.    Eyes:  Negative for visual disturbance.   Respiratory:  Negative for shortness of breath.    Cardiovascular:  Negative for chest pain.   Gastrointestinal:  Positive for abdominal pain and diarrhea.   Genitourinary:  Negative for dysuria.        Urinary retention.    Musculoskeletal:  Negative for joint swelling.   Skin:  Negative for rash.   Neurological:  Negative for weakness.   Psychiatric/Behavioral:  Negative for confusion.      Physical Exam     Initial Vitals [10/13/22 1956]   BP Pulse Resp Temp SpO2   (!) 190/76 97 14 99.1 °F (37.3 °C) 97 %      MAP       --         Physical Exam    Nursing note and vitals reviewed.  Constitutional: She appears well-developed and well-nourished. She is not diaphoretic. No distress.   HENT:   Head: Normocephalic and atraumatic.   Eyes: Conjunctivae and EOM are normal. Pupils are equal, round, and reactive to light.   Neck:   Normal range of motion.  Cardiovascular:  Normal rate, regular rhythm, normal heart sounds and intact distal pulses.           No murmur  heard.  Pulmonary/Chest: Breath sounds normal. No respiratory distress. She has no wheezes. She has no rales.   Abdominal: Abdomen is soft. She exhibits no distension. There is abdominal tenderness (to palpation) in the left lower quadrant.   Musculoskeletal:         General: No tenderness or edema. Normal range of motion.      Cervical back: Normal range of motion.     Neurological: She is alert and oriented to person, place, and time. No cranial nerve deficit.   Skin: Skin is warm. No rash noted. No erythema.       ED Course   Procedures  Labs Reviewed   URINALYSIS, REFLEX TO URINE CULTURE - Abnormal; Notable for the following components:       Result Value    Blood, UA 1+ (*)     Leukocyte Esterase, UA 1+ (*)     All other components within normal limits   CBC WITH DIFFERENTIAL - Abnormal; Notable for the following components:    WBC 12.4 (*)     MCH 26.4 (*)     MCHC 31.5 (*)     All other components within normal limits   URINALYSIS, MICROSCOPIC - Abnormal; Notable for the following components:    WBC, UA 6 (*)     All other components within normal limits   LIPASE - Normal   CBC W/ AUTO DIFFERENTIAL    Narrative:     The following orders were created for panel order CBC auto differential.  Procedure                               Abnormality         Status                     ---------                               -----------         ------                     CBC with Differential[[id number]]        Abnormal            Final result                 Please view results for these tests on the individual orders.   COMPREHENSIVE METABOLIC PANEL          Imaging Results              CT Abdomen Pelvis With Contrast (Final result)  Result time 10/14/22 08:52:38      Final result by Russ Otto MD (10/14/22 08:52:38)                   Impression:      1. Two suspected areas of uncomplicated colonic diverticulitis.  2. Two left renal pelvis/ureteral stones with mild left hydronephrosis.  No significant discrepancy  between my interpretation and the preliminary radiology report.      Electronically signed by: Russ Otto  Date:    10/14/2022  Time:    08:52               Narrative:    EXAMINATION:  CT ABDOMEN PELVIS WITH CONTRAST    CLINICAL HISTORY:  LLQ abdominal pain;    TECHNIQUE:  CT imaging of the abdomen and pelvis after the administration of 100 mL of Isovue 370 intravenous contrast. Dose length product 812 mGycm. Automatic exposure control, adjustment of mA/kV or iterative reconstruction technique used to limit radiation dose.    COMPARISON:  No relevant comparison studies available at the time of dictation.    FINDINGS:  Liver/biliary: Mild generalized hepatic steatosis.  No radiodense gallstones. No intra or extrahepatic biliary ductal dilation.    Pancreas: Normal.    Spleen: Normal.    Adrenals: Normal.    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  A stone in the proximal to midportion of the left ureter measures 12 mm in length and 8 mm in transverse diameter.  There is a 9 mm stone in the left upper pole renal pelvis.  These result in mild left hydronephrosis.  Few other nonobstructing renal stones bilaterally.  Bladder decompressed and not well evaluated.  Uterus not seen.  No adnexal mass.    Stomach/bowel: No evidence of bowel obstruction. Normal appendix. Colonic diverticulosis with mild wall thickening and pericolonic stranding along a short segment descending colon.  There is an additional small site of less extensive inflammation involving a diverticulum along the sigmoid colon (series 4, image 71).    Lymph nodes/peritoneum: No pathologically enlarged lymph node identified. Trace ascites near the inflamed segment of descending colon.  No pneumoperitoneum or drainable fluid collection.    Vasculature: Normal abdominal aortic caliber.    Abdominal wall: Minimal postsurgical change in the lower anterior abdominal wall.    Lung bases: No consolidation or pleural  effusion.    Musculoskeletal: No acute osseous findings. Interbody implants at L4-5 and L5-S1.                        Preliminary result by Russ Otto MD (10/14/22 01:44:30)                   Narrative:    START OF REPORT:  Technique: CT of the abdomen and pelvis was performed with axial images as well as sagittal and coronal reconstruction images with intravenous contrast.    Comparison: None available.    Clinical History: LLQ abdominal pain.    Dosage Information: Automated Exposure Control was utilized.    Findings:  Thorax:  Lungs: Streaky opacity is present at the visualized lung bases, consistent with nonspecific dependent changes scarring and atelectasis.  Pleura: No effusions or thickening.  Heart: The heart size is within normal limits.  Abdomen:  Abdominal Wall: No abdominal wall pathology is seen.  Liver: The liver appears unremarkable.  Biliary System: No extrahepatic biliary duct dilatation is seen.  Gallbladder: The gallbladder appears unremarkable.  Pancreas: The pancreas appears unremarkable.  Spleen: The spleen appears unremarkable.  Adrenals: The adrenal glands appear unremarkable.  Kidneys: There is mild left hydroureteronephrosis secondary to an 8 mm obstructing calculus in the proximal ureter (series 2, image 50 and series 4, image 55). There is associated periureteral fat stranding. There is also mild mucosal wall thickening of the left proximal ureter which may reflect an element of pyeloureteritis. Multiple nonobstructing bilateral renal calculi are seen which measure 3-4 mm, with three stones in the lower pole of the right kidney and a single stone in the mid pole of the left kidney.  Aorta: There is mild calcification of the abdominal aorta and its branches.  IVC: Unremarkable.  Bowel:  Esophagus: The visualized esophagus appears unremarkable.  Stomach: The stomach appears unremarkable.  Duodenum: Unremarkable appearing duodenum.  Small Bowel: The small bowel appears  unremarkable.  Colon: There are multiple colonic diverticula. There is an inflamed diverticulum in the mid descending colon (series 2, image 55) with surrounding fat stranding. This is consistent with diverticulitis. No evidence of perforation or abscess.  Appendix: The appendix appears unremarkable.  Peritoneum: No intraperitoneal free air or ascites is seen.    Pelvis: Calcific densities are seen in the pelvis, likely representing phleboliths.  Bladder: The bladder is nondistended and cannot be definitively evaluated.  Female:  Uterus: The uterus is surgically absent.  Ovaries: No adnexal masses are seen.    Bony structures:  Dorsal Spine: There is mild spondylosis of the visualized dorsal spine. Interbody cage devices are seen at L4-L5 and L5-S1.      Impression:  1. There is mild left hydroureteronephrosis secondary to an 8 mm obstructing calculus in the proximal ureter (series 2, image 50 and series 4, image 55). There is also mild mucosal wall thickening of the left proximal ureter which may reflect an element of pyeloureteritis. Correlate with clinical and laboratory findings as regards additional evaluation and follow-up.  2. There is an inflamed diverticulum in the mid descending colon (series 2, image 55) with surrounding fat stranding. This is consistent with diverticulitis. No evidence of perforation or abscess.  3. Details as above.                          Preliminary result by Russ Otto MD (10/14/22 01:44:30)                   Narrative:    START OF REPORT:  Technique: CT of the abdomen and pelvis was performed with axial images as well as sagittal and coronal reconstruction images with intravenous contrast.    Comparison: None available.    Clinical History: LLQ abdominal pain.    Dosage Information: Automated Exposure Control was utilized.    Findings:  Thorax:  Lungs: Streaky opacity is present at the visualized lung bases, consistent with nonspecific dependent changes scarring and  atelectasis.  Pleura: No effusions or thickening.  Heart: The heart size is within normal limits.  Abdomen:  Abdominal Wall: No abdominal wall pathology is seen.  Liver: The liver appears unremarkable.  Biliary System: No extrahepatic biliary duct dilatation is seen.  Gallbladder: The gallbladder appears unremarkable.  Pancreas: The pancreas appears unremarkable.  Spleen: The spleen appears unremarkable.  Adrenals: The adrenal glands appear unremarkable.  Kidneys: There is mild left hydroureteronephrosis secondary to an 8 mm obstructing calculus in the proximal ureter (series 2, image 50 and series 4, image 55). There is associated periureteral fat stranding. There is also mild mucosal wall thickening of the left proximal ureter which may reflect an element of pyeloureteritis. Multiple nonobstructing bilateral renal calculi are seen which measure 3-4 mm, with three stones in the lower pole of the right kidney and a single stone in the mid pole of the left kidney.  Aorta: There is mild calcification of the abdominal aorta and its branches.  IVC: Unremarkable.  Bowel:  Esophagus: The visualized esophagus appears unremarkable.  Stomach: The stomach appears unremarkable.  Duodenum: Unremarkable appearing duodenum.  Small Bowel: The small bowel appears unremarkable.  Colon: There are multiple colonic diverticula. There is an inflamed diverticulum in the mid descending colon (series 2, image 55) with surrounding fat stranding. This is consistent with diverticulitis. No evidence of perforation or abscess.  Appendix: The appendix appears unremarkable.  Peritoneum: No intraperitoneal free air or ascites is seen.    Pelvis: Calcific densities are seen in the pelvis, likely representing phleboliths.  Bladder: The bladder is nondistended and cannot be definitively evaluated.  Female:  Uterus: The uterus is surgically absent.  Ovaries: No adnexal masses are seen.    Bony structures:  Dorsal Spine: There is mild spondylosis of  the visualized dorsal spine. Interbody cage devices are seen at L4-L5 and L5-S1.      Impression:  1. There is mild left hydroureteronephrosis secondary to an 8 mm obstructing calculus in the proximal ureter (series 2, image 50 and series 4, image 55). There is also mild mucosal wall thickening of the left proximal ureter which may reflect an element of pyeloureteritis. Correlate with clinical and laboratory findings as regards additional evaluation and follow-up.  2. There is an inflamed diverticulum in the mid descending colon (series 2, image 55) with surrounding fat stranding. This is consistent with diverticulitis. No evidence of perforation or abscess.  3. Details as above.                                         Medications   iopamidoL (ISOVUE-370) injection 100 mL (100 mLs Intravenous Given 10/14/22 0144)   morphine injection 4 mg (4 mg Intravenous Given 10/14/22 0300)   ondansetron injection 4 mg (4 mg Intravenous Given 10/14/22 0300)   lactated ringers bolus 1,000 mL (0 mLs Intravenous Stopped 10/14/22 0400)   ciprofloxacin (CIPRO)400mg/200ml D5W IVPB 400 mg (0 mg Intravenous Stopped 10/14/22 0400)   metronidazole IVPB 500 mg (0 mg Intravenous Stopped 10/14/22 0445)   morphine injection 4 mg (4 mg Intravenous Given 10/14/22 0645)   ondansetron injection 4 mg (4 mg Intravenous Given 10/14/22 0645)   promethazine injection 12.5 mg (12.5 mg Intramuscular Given 10/14/22 0751)   ondansetron injection 4 mg (4 mg Intravenous Given 10/14/22 0751)   albuterol-ipratropium 2.5 mg-0.5 mg/3 mL nebulizer solution 3 mL (3 mLs Nebulization Given 10/14/22 1002)     Medical Decision Making:   Clinical Tests:   Lab Tests: Reviewed and Ordered  Radiological Study: Reviewed and Ordered  ED Management:  Patient is a 57 y/o presents for abdominal pain.  See HPI/exam.  Imaging with diverticulitis and renal stone.  Discussed with urology.  Discussed with medicine, startedon IV abx.  Discussed all results, answered all questions at  this time.  Patient verbalized understanding and agreed to plan.         Scribe Attestation:   Scribe #1: I performed the above scribed service and the documentation accurately describes the services I performed. I attest to the accuracy of the note.    Attending Attestation:           Physician Attestation for Scribe:  Physician Attestation Statement for Scribe #1: I, reviewed documentation, as scribed by Asher Brooks in my presence, and it is both accurate and complete.           ED Course as of 11/01/22 1522   Fri Oct 14, 2022   0732 Consulted with hospitalist, patient will be admitted.  [MS]   0733 Consulted with urology, patient will be taken in for surgery.  [RP]      ED Course User Index  [MS] Asher Brooks  [RP] Justin Bustillos MD                 Clinical Impression:   Final diagnoses:  [K57.92] Diverticulitis (Primary)  [N20.0] Kidney stone  [R10.32] Left lower quadrant abdominal pain  [R11.2] Nausea and vomiting, unspecified vomiting type      ED Disposition Condition    Admit Stable                Justin Bustillos MD  11/01/22 1523

## 2022-10-14 NOTE — ANESTHESIA PREPROCEDURE EVALUATION
"                                                                                                           10/14/2022  Michelle Foote is a 56 y.o., female.    Other Medical History   Diverticular disease of large intestine without perforation or abscess      13.6/43/382k  Na 137, K 4.4, Cr 0.63  Acute diverticulitis, essential hypertension    "HPI: Ms. Foote is a 57 yo female with a past medical history of kidney stones and diverticulosis presents to the emergency department complaints of abdominal pain x1 week. Abdominal pain radiates from left to right. Reports associated nausea and vomiting. Denies known fever, chills, gross hematuria or dysuria. Labs on arrival; WBC 12.4, H&H 13.6 & 43.2, BUN & creatinine 12 & 0.63, UA shows clear yellow urine, +1 occult blood, +1 leukocytes, 6 WBC's and no bacteria seen. Abdominal/ pelvic CT shows a 12 x 8 mm stone in the proximal to mid ureter and a 9 mm stone in the upper pole of the renal pelvis causing mild hydronephrosis."    Pre-op Assessment    I have reviewed the Patient Summary Reports.     I have reviewed the Nursing Notes. I have reviewed the NPO Status.   I have reviewed the Medications.     Review of Systems  Anesthesia Hx:   Denies Personal Hx of Anesthesia complications.   Hematology/Oncology:  Hematology Normal   Oncology Normal     Cardiovascular:   Hypertension    Pulmonary:  Pulmonary Normal    Hepatic/GI:  Hepatic/GI Normal    Musculoskeletal:  Musculoskeletal Normal    Neurological:  Neurology Normal        Physical Exam  General: Well nourished, Cooperative and Alert    Airway:  Mallampati: II   Mouth Opening: Normal  TM Distance: Normal  Tongue: Normal    Dental:  Intact        Anesthesia Plan  Type of Anesthesia, risks & benefits discussed:    Anesthesia Type: Gen Supraglottic Airway  Intra-op Monitoring Plan: Standard ASA Monitors  Post Op Pain Control Plan: multimodal analgesia  Induction:  IV  Informed Consent: Informed consent signed with the " Patient and all parties understand the risks and agree with anesthesia plan.  All questions answered.   ASA Score: 2  Day of Surgery Review of History & Physical: H&P Update referred to the surgeon/provider.    Ready For Surgery From Anesthesia Perspective.     .

## 2022-10-14 NOTE — ED NOTES
Assumed care of pt, pt resting in bed, nadn, respirations full and even. Repositioned self in bed for comfort. Voiced no complaints at present. SR up x2, HOB elevated, call light in reach. Right PIV infusing without difficulty. Will continue to monitor.

## 2022-10-14 NOTE — H&P
[facility] Medicine History & Physical Examination       Patient Name: Michelle Foote  MRN: [id number]  Patient Class: IP- Inpatient   Admission Date: 10/13/2022   Admitting Physician: Jethro Mix MD   Length of Stay: 0  Attending Physician: Jethro Mix MD   Primary Care Provider: None  Face-to-Face encounter date: 10/14/2022  Code Status: Full Code  Chief Complaint: Abdominal Pain (Hx diverticulitis and kidney stones. States x 1 week. States left sided sharp pain that has now travelled to right side. States urinary retention. States bowel movements are dark and more odorous than usual. States migraine. States no intake x 4 days except for beef broth. )        Patient information was obtained from patient, patient's family, past medical records and ER records.     HISTORY OF PRESENT ILLNESS:   Michelle Foote is a 56 y.o. White female with a past medical history of diverticulosis, kidney stones, chronic back pain, hypertension and hyperlipidemia. The patient presented to [facility] on 10/13/2022 with a primary complaint of abdominal pain.  Patient reports experiencing left lower abdominal/flank pain on 10/08/2022.  She reports pain with similar to a prior diverticulosis episode and therefore she began a clear liquid diet which usually will resolve on its own. She states pain worsened and began to radiate to the right lower quadrant and to the left back.  She reports associated symptoms of nausea and 5-6 episodes of diarrhea day. She denies using stool softeners or laxative use. She denies complaints of fever, cough, chest pain, shortness a breath, vomiting, hematuria. Patient reports after going to CT she feels as if she has some chest tightness with wheezing. She normally uses albuterol at home. She is not on any medication for hypertension or hyperlipidemia.     Upon presentation to the ED, temperature 99.1° F, blood pressure 190/76 and SpO2 97% on room air.  Labs  notable for WBC 12.4 otherwise overall unremarkable.  UA with 6 WBC, 1+ leukocyte esterase 1+ occult blood.  Preliminary CT abdomen pelvis revealed mild left hydroureteronephrosis secondary to an 8 mm obstructing callus in the proximal ureter, mild mucosal wall thickening of the left proximal of urinary reflecting element of pyeloureteritis an inflamed diverticulum in the mid descending colon with surrounding fat stranding consistent with diverticulitis, no evidence of perforation or abscess.  While in ED patient received morphine, Zofran, Compazine, Cipro and Flagyl.  Patient is admitted to hospital medicine services and further medical management.    PAST MEDICAL HISTORY:   Hypertension  Hyperlipidemia  Diverticulosis   Kidney stones   Chronic back pain    PAST SURGICAL HISTORY:   Hysterectomy     Back surgery    ALLERGIES:   Rifampin    FAMILY HISTORY:   Mother:  COPD, atrial fibrillation, cardiovascular disease  Father:   due to brain tumor    SOCIAL HISTORY:   Tobacco - Denies  Alcohol - Occasionally  Illicit Drugs - Denies    HOME MEDICATIONS:   As documented    REVIEW OF SYSTEMS:   Except as documented, all other systems reviewed and negative     PHYSICAL EXAM:     VITAL SIGNS: 24 HRS MIN & MAX LAST   Temp  Min: 99.1 °F (37.3 °C)  Max: 99.1 °F (37.3 °C) 99.1 °F (37.3 °C)   BP  Min: 125/75  Max: 190/76 125/75   Pulse  Min: 83  Max: 97  86   Resp  Min: 14  Max: 18 18   SpO2  Min: 97 %  Max: 100 % 99 %       General appearance: Well-developed, well-nourished female in no apparent distress. No family at bedside.  HEENT: Atraumatic head. Moist mucous membranes of oral cavity.  Lungs: Wheezing to left lower lung field.    Heart: Regular rate and rhythm.   Abdomen: Soft, non-distended, tenderness to left lower quadrant. Bowel sounds are hyperactive.  Genitourinary:  Left CVA tenderness.  Extremities: No cyanosis, clubbing. No deformities.  Skin: No Rash. Warm and dry.  Neuro: Awake, alert and  oriented. Motor and sensory exams grossly intact.  Psych/mental status: Appropriate mood and affect. Cooperative. Responds appropriately to questions.       LABS AND IMAGING:     Recent Labs   Lab 10/13/22  2014   WBC 12.4*   RBC 5.15   HGB 13.6   HCT 43.2   MCV 83.9   MCH 26.4*   MCHC 31.5*   RDW 12.7      MPV 9.0       Recent Labs   Lab 10/13/22  2014      K 4.4   CO2 23   BUN 12.0   CREATININE 0.63   CALCIUM 9.8   ALBUMIN 4.2   ALKPHOS 67   ALT 24   AST 22   BILITOT 0.6       Microbiology Results (last 7 days)       ** No results found for the last 168 hours. **             CT Abdomen Pelvis With Contrast  START OF REPORT:  Technique: CT of the abdomen and pelvis was performed with axial images as well as sagittal and coronal reconstruction images with intravenous contrast.    Comparison: None available.    Clinical History: LLQ abdominal pain.    Dosage Information: Automated Exposure Control was utilized.    Findings:  Thorax:  Lungs: Streaky opacity is present at the visualized lung bases, consistent with nonspecific dependent changes scarring and atelectasis.  Pleura: No effusions or thickening.  Heart: The heart size is within normal limits.  Abdomen:  Abdominal Wall: No abdominal wall pathology is seen.  Liver: The liver appears unremarkable.  Biliary System: No extrahepatic biliary duct dilatation is seen.  Gallbladder: The gallbladder appears unremarkable.  Pancreas: The pancreas appears unremarkable.  Spleen: The spleen appears unremarkable.  Adrenals: The adrenal glands appear unremarkable.  Kidneys: There is mild left hydroureteronephrosis secondary to an 8 mm obstructing calculus in the proximal ureter (series 2, image 50 and series 4, image 55). There is associated periureteral fat stranding. There is also mild mucosal wall thickening of the left proximal ureter which may reflect an element of pyeloureteritis. Multiple nonobstructing bilateral renal calculi are seen which measure 3-4 mm,  with three stones in the lower pole of the right kidney and a single stone in the mid pole of the left kidney.  Aorta: There is mild calcification of the abdominal aorta and its branches.  IVC: Unremarkable.  Bowel:  Esophagus: The visualized esophagus appears unremarkable.  Stomach: The stomach appears unremarkable.  Duodenum: Unremarkable appearing duodenum.  Small Bowel: The small bowel appears unremarkable.  Colon: There are multiple colonic diverticula. There is an inflamed diverticulum in the mid descending colon (series 2, image 55) with surrounding fat stranding. This is consistent with diverticulitis. No evidence of perforation or abscess.  Appendix: The appendix appears unremarkable.  Peritoneum: No intraperitoneal free air or ascites is seen.    Pelvis: Calcific densities are seen in the pelvis, likely representing phleboliths.  Bladder: The bladder is nondistended and cannot be definitively evaluated.  Female:  Uterus: The uterus is surgically absent.  Ovaries: No adnexal masses are seen.    Bony structures:  Dorsal Spine: There is mild spondylosis of the visualized dorsal spine. Interbody cage devices are seen at L4-L5 and L5-S1.    Impression:  1. There is mild left hydroureteronephrosis secondary to an 8 mm obstructing calculus in the proximal ureter (series 2, image 50 and series 4, image 55). There is also mild mucosal wall thickening of the left proximal ureter which may reflect an element of pyeloureteritis. Correlate with clinical and laboratory findings as regards additional evaluation and follow-up.  2. There is an inflamed diverticulum in the mid descending colon (series 2, image 55) with surrounding fat stranding. This is consistent with diverticulitis. No evidence of perforation or abscess.  3. Details as above.        ASSESSMENT & PLAN:   Assessment:  8 mm left obstructing ureteral stone with left hydroureteronephrosis  Acute diverticulitis  Essential hypertension     Plan:  - Urology  consulted.  Appreciate recommendations   - IV fluid hydration   - Continue with Cipro and Flagyl for diverticulitis  - NPO  - IV Morphine and Toradol as needed for pain  - Will order for Duoneb for wheezing  - Labs in AM      VTE Prophylaxis: will be placed on SCD for DVT prophylaxis and will be advised to be as mobile as possible and sit in a chair as tolerated      __________________________________________________________________________  INPATIENT LIST OF MEDICATIONS     Current Facility-Administered Medications:     0.9%  NaCl infusion, , Intravenous, Continuous, Chiqui Ramírez PA-C, Last Rate: 100 mL/hr at 10/14/22 0755, New Bag at 10/14/22 0755    acetaminophen tablet 650 mg, 650 mg, Oral, Q8H PRN, Chiqui Ramírez PA-C    acetaminophen tablet 650 mg, 650 mg, Oral, Q4H PRN, Chiqui Ramírez PA-C    ciprofloxacin (CIPRO)400mg/200ml D5W IVPB 400 mg, 400 mg, Intravenous, Q12H, Chiqui Ramírez PA-C    dextrose 10% bolus 125 mL, 12.5 g, Intravenous, PRN, GERSON Marques-GRIFFIN    dextrose 10% bolus 250 mL, 25 g, Intravenous, PRN, Chiqui Ramírez PA-C    glucagon (human recombinant) injection 1 mg, 1 mg, Intramuscular, PRN, Chiqui Ramírez PA-C    glucose chewable tablet 16 g, 16 g, Oral, PRN, Chiqui Ramírez PA-C    glucose chewable tablet 24 g, 24 g, Oral, PRN, Chiqui Ramírez PA-C    metronidazole IVPB 500 mg, 500 mg, Intravenous, Q8H, Chiqui Ramírez PA-C    ondansetron injection 4 mg, 4 mg, Intravenous, Q4H PRN, Chiqui Ramírez PA-C    sodium chloride 0.9% flush 10 mL, 10 mL, Intravenous, Q12H PRN, Chiqui Ramírez PA-C  No current outpatient medications on file.      Scheduled Meds:   ciprofloxacin  400 mg Intravenous Q12H    metronidazole  500 mg Intravenous Q8H     Continuous Infusions:   sodium chloride 0.9% 100 mL/hr at 10/14/22 0755     PRN Meds:.acetaminophen, acetaminophen, dextrose 10%, dextrose 10%, glucagon (human recombinant), glucose, glucose, ondansetron, sodium chloride  0.9%      Discharge Planning and Disposition: Anticipated discharge to be determined.    IChiqui PA, have reviewed and discussed the case with Dr. Jethro Mix MD    Please see the following addendum for further assessment and plan from there attending MD.    Chiqui Ramírez PA-C  10/14/2022

## 2022-10-15 VITALS
WEIGHT: 163 LBS | TEMPERATURE: 98 F | RESPIRATION RATE: 18 BRPM | OXYGEN SATURATION: 100 % | DIASTOLIC BLOOD PRESSURE: 75 MMHG | SYSTOLIC BLOOD PRESSURE: 166 MMHG | BODY MASS INDEX: 30 KG/M2 | HEIGHT: 62 IN | HEART RATE: 78 BPM

## 2022-10-15 LAB
ALBUMIN SERPL-MCNC: 2.9 GM/DL (ref 3.5–5)
ALBUMIN/GLOB SERPL: 1.2 RATIO (ref 1.1–2)
ALP SERPL-CCNC: 46 UNIT/L (ref 40–150)
ALT SERPL-CCNC: 16 UNIT/L (ref 0–55)
AST SERPL-CCNC: 15 UNIT/L (ref 5–34)
BASOPHILS # BLD AUTO: 0.04 X10(3)/MCL (ref 0–0.2)
BASOPHILS NFR BLD AUTO: 0.6 %
BILIRUBIN DIRECT+TOT PNL SERPL-MCNC: 0.3 MG/DL
BUN SERPL-MCNC: 11 MG/DL (ref 9.8–20.1)
CALCIUM SERPL-MCNC: 8.7 MG/DL (ref 8.4–10.2)
CHLORIDE SERPL-SCNC: 109 MMOL/L (ref 98–107)
CO2 SERPL-SCNC: 23 MMOL/L (ref 22–29)
CREAT SERPL-MCNC: 0.69 MG/DL (ref 0.55–1.02)
EOSINOPHIL # BLD AUTO: 0.18 X10(3)/MCL (ref 0–0.9)
EOSINOPHIL NFR BLD AUTO: 2.8 %
ERYTHROCYTE [DISTWIDTH] IN BLOOD BY AUTOMATED COUNT: 12.8 % (ref 11.5–17)
GFR SERPLBLD CREATININE-BSD FMLA CKD-EPI: >60 MLS/MIN/1.73/M2
GLOBULIN SER-MCNC: 2.5 GM/DL (ref 2.4–3.5)
GLUCOSE SERPL-MCNC: 166 MG/DL (ref 74–100)
HCT VFR BLD AUTO: 32.3 % (ref 37–47)
HGB BLD-MCNC: 10.1 GM/DL (ref 12–16)
IMM GRANULOCYTES # BLD AUTO: 0.02 X10(3)/MCL (ref 0–0.04)
IMM GRANULOCYTES NFR BLD AUTO: 0.3 %
LYMPHOCYTES # BLD AUTO: 2.66 X10(3)/MCL (ref 0.6–4.6)
LYMPHOCYTES NFR BLD AUTO: 42 %
MCH RBC QN AUTO: 26.6 PG (ref 27–31)
MCHC RBC AUTO-ENTMCNC: 31.3 MG/DL (ref 33–36)
MCV RBC AUTO: 85.2 FL (ref 80–94)
MONOCYTES # BLD AUTO: 0.49 X10(3)/MCL (ref 0.1–1.3)
MONOCYTES NFR BLD AUTO: 7.7 %
NEUTROPHILS # BLD AUTO: 2.9 X10(3)/MCL (ref 2.1–9.2)
NEUTROPHILS NFR BLD AUTO: 46.6 %
NRBC BLD AUTO-RTO: 0 %
PLATELET # BLD AUTO: 270 X10(3)/MCL (ref 130–400)
PMV BLD AUTO: 9.6 FL (ref 7.4–10.4)
POTASSIUM SERPL-SCNC: 3.8 MMOL/L (ref 3.5–5.1)
PROT SERPL-MCNC: 5.4 GM/DL (ref 6.4–8.3)
RBC # BLD AUTO: 3.79 X10(6)/MCL (ref 4.2–5.4)
SODIUM SERPL-SCNC: 139 MMOL/L (ref 136–145)
WBC # SPEC AUTO: 6.3 X10(3)/MCL (ref 4.5–11.5)

## 2022-10-15 PROCEDURE — 36415 COLL VENOUS BLD VENIPUNCTURE: CPT | Performed by: PHYSICIAN ASSISTANT

## 2022-10-15 PROCEDURE — G0378 HOSPITAL OBSERVATION PER HR: HCPCS

## 2022-10-15 PROCEDURE — 96376 TX/PRO/DX INJ SAME DRUG ADON: CPT

## 2022-10-15 PROCEDURE — 63600175 PHARM REV CODE 636 W HCPCS: Performed by: PHYSICIAN ASSISTANT

## 2022-10-15 PROCEDURE — 25000003 PHARM REV CODE 250: Performed by: PHYSICIAN ASSISTANT

## 2022-10-15 PROCEDURE — 96366 THER/PROPH/DIAG IV INF ADDON: CPT

## 2022-10-15 PROCEDURE — 85025 COMPLETE CBC W/AUTO DIFF WBC: CPT | Performed by: PHYSICIAN ASSISTANT

## 2022-10-15 PROCEDURE — 96375 TX/PRO/DX INJ NEW DRUG ADDON: CPT

## 2022-10-15 PROCEDURE — 80053 COMPREHEN METABOLIC PANEL: CPT | Performed by: PHYSICIAN ASSISTANT

## 2022-10-15 PROCEDURE — S0030 INJECTION, METRONIDAZOLE: HCPCS | Performed by: PHYSICIAN ASSISTANT

## 2022-10-15 PROCEDURE — 63600175 PHARM REV CODE 636 W HCPCS: Performed by: HOSPITALIST

## 2022-10-15 RX ORDER — ALPRAZOLAM 0.5 MG/1
0.5 TABLET ORAL 3 TIMES DAILY PRN
Qty: 15 TABLET | Refills: 0 | Status: SHIPPED | OUTPATIENT
Start: 2022-10-15 | End: 2022-10-20

## 2022-10-15 RX ORDER — KETOROLAC TROMETHAMINE 10 MG/1
10 TABLET, FILM COATED ORAL EVERY 6 HOURS
Qty: 20 TABLET | Refills: 0 | Status: SHIPPED | OUTPATIENT
Start: 2022-10-15 | End: 2022-10-20

## 2022-10-15 RX ORDER — LABETALOL HYDROCHLORIDE 5 MG/ML
10 INJECTION, SOLUTION INTRAVENOUS EVERY 4 HOURS PRN
Status: DISCONTINUED | OUTPATIENT
Start: 2022-10-15 | End: 2022-10-15 | Stop reason: HOSPADM

## 2022-10-15 RX ORDER — CIPROFLOXACIN 500 MG/1
500 TABLET ORAL EVERY 8 HOURS
Qty: 27 TABLET | Refills: 0 | Status: SHIPPED | OUTPATIENT
Start: 2022-10-15 | End: 2022-10-24

## 2022-10-15 RX ORDER — METRONIDAZOLE 500 MG/1
500 TABLET ORAL EVERY 8 HOURS
Qty: 27 TABLET | Refills: 0 | Status: SHIPPED | OUTPATIENT
Start: 2022-10-15 | End: 2022-10-24

## 2022-10-15 RX ORDER — HYDRALAZINE HYDROCHLORIDE 20 MG/ML
10 INJECTION INTRAMUSCULAR; INTRAVENOUS EVERY 4 HOURS PRN
Status: DISCONTINUED | OUTPATIENT
Start: 2022-10-15 | End: 2022-10-15 | Stop reason: HOSPADM

## 2022-10-15 RX ADMIN — KETOROLAC TROMETHAMINE 30 MG: 30 INJECTION, SOLUTION INTRAMUSCULAR at 12:10

## 2022-10-15 RX ADMIN — METRONIDAZOLE 500 MG: 5 INJECTION, SOLUTION INTRAVENOUS at 01:10

## 2022-10-15 RX ADMIN — CIPROFLOXACIN 400 MG: 2 INJECTION, SOLUTION INTRAVENOUS at 02:10

## 2022-10-15 RX ADMIN — HYDRALAZINE HYDROCHLORIDE 10 MG: 20 INJECTION INTRAMUSCULAR; INTRAVENOUS at 01:10

## 2022-10-15 RX ADMIN — CIPROFLOXACIN 400 MG: 2 INJECTION, SOLUTION INTRAVENOUS at 03:10

## 2022-10-15 RX ADMIN — ACETAMINOPHEN 650 MG: 325 TABLET ORAL at 07:10

## 2022-10-15 RX ADMIN — KETOROLAC TROMETHAMINE 30 MG: 30 INJECTION, SOLUTION INTRAMUSCULAR at 06:10

## 2022-10-15 RX ADMIN — METRONIDAZOLE 500 MG: 5 INJECTION, SOLUTION INTRAVENOUS at 05:10

## 2022-10-15 NOTE — PROGRESS NOTES
UROLOGY  PROGRESS  NOTE    Michelle Foote 1965  [id number]  10/15/2022    Primary Urologist: N/A  On Call Urology: Anderson Nova MD    Subjective:  56-year-old female history of kidney stones and diverticulitis.  Postoperative day 1 cysto left double-J stent placement.  No complaints.  Clinically well      Objective:  Wt Readings from Last 3 Encounters:   10/14/22 73.9 kg (163 lb)   06/26/18 81.4 kg (179 lb 7.3 oz)     Temp Readings from Last 3 Encounters:   10/15/22 98.1 °F (36.7 °C) (Oral)     BP Readings from Last 3 Encounters:   10/15/22 (!) 169/91   06/26/18 127/81     Pulse Readings from Last 3 Encounters:   10/15/22 61       Intake/Output:  No intake/output data recorded.  I/O last 3 completed shifts:  In: 1680.5 [P.O.:480; I.V.:1200.5]  Out: 650 [Urine:650]       Exam:    NCAT  Card RRR  Resp unlabored  Abd  soft nontender nondistended    deferred  Extremity no C/C/E      Recent Results (from the past 24 hour(s))   COVID-19 Rapid Screening    Collection Time: 10/14/22 12:40 PM   Result Value Ref Range    SARS COV-2 MOLECULAR Negative Negative   Comprehensive Metabolic Panel (CMP)    Collection Time: 10/15/22  4:12 AM   Result Value Ref Range    Sodium Level 139 136 - 145 mmol/L    Potassium Level 3.8 3.5 - 5.1 mmol/L    Chloride 109 (H) 98 - 107 mmol/L    Carbon Dioxide 23 22 - 29 mmol/L    Glucose Level 166 (H) 74 - 100 mg/dL    Blood Urea Nitrogen 11.0 9.8 - 20.1 mg/dL    Creatinine 0.69 0.55 - 1.02 mg/dL    Calcium Level Total 8.7 8.4 - 10.2 mg/dL    Protein Total 5.4 (L) 6.4 - 8.3 gm/dL    Albumin Level 2.9 (L) 3.5 - 5.0 gm/dL    Globulin 2.5 2.4 - 3.5 gm/dL    Albumin/Globulin Ratio 1.2 1.1 - 2.0 ratio    Bilirubin Total 0.3 <=1.5 mg/dL    Alkaline Phosphatase 46 40 - 150 unit/L    Alanine Aminotransferase 16 0 - 55 unit/L    Aspartate Aminotransferase 15 5 - 34 unit/L    eGFR >60 mls/min/1.73/m2   CBC with Differential    Collection Time: 10/15/22  4:12 AM   Result Value Ref Range    WBC 6.3 4.5 -  11.5 x10(3)/mcL    RBC 3.79 (L) 4.20 - 5.40 x10(6)/mcL    Hgb 10.1 (L) 12.0 - 16.0 gm/dL    Hct 32.3 (L) 37.0 - 47.0 %    MCV 85.2 80.0 - 94.0 fL    MCH 26.6 (L) 27.0 - 31.0 pg    MCHC 31.3 (L) 33.0 - 36.0 mg/dL    RDW 12.8 11.5 - 17.0 %    Platelet 270 130 - 400 x10(3)/mcL    MPV 9.6 7.4 - 10.4 fL    Neut % 46.6 %    Lymph % 42.0 %    Mono % 7.7 %    Eos % 2.8 %    Basophil % 0.6 %    Lymph # 2.66 0.6 - 4.6 x10(3)/mcL    Neut # 2.9 2.1 - 9.2 x10(3)/mcL    Mono # 0.49 0.1 - 1.3 x10(3)/mcL    Eos # 0.18 0 - 0.9 x10(3)/mcL    Baso # 0.04 0 - 0.2 x10(3)/mcL    IG# 0.02 0 - 0.04 x10(3)/mcL    IG% 0.3 %    NRBC% 0.0 %       Imaging Results              CT Abdomen Pelvis With Contrast (Final result)  Result time 10/14/22 08:52:38      Final result by Russ Otto MD (10/14/22 08:52:38)                   Impression:      1. Two suspected areas of uncomplicated colonic diverticulitis.  2. Two left renal pelvis/ureteral stones with mild left hydronephrosis.  No significant discrepancy between my interpretation and the preliminary radiology report.      Electronically signed by: Russ Otto  Date:    10/14/2022  Time:    08:52               Narrative:    EXAMINATION:  CT ABDOMEN PELVIS WITH CONTRAST    CLINICAL HISTORY:  LLQ abdominal pain;    TECHNIQUE:  CT imaging of the abdomen and pelvis after the administration of 100 mL of Isovue 370 intravenous contrast. Dose length product 812 mGycm. Automatic exposure control, adjustment of mA/kV or iterative reconstruction technique used to limit radiation dose.    COMPARISON:  No relevant comparison studies available at the time of dictation.    FINDINGS:  Liver/biliary: Mild generalized hepatic steatosis.  No radiodense gallstones. No intra or extrahepatic biliary ductal dilation.    Pancreas: Normal.    Spleen: Normal.    Adrenals: Normal.    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  A stone in the proximal to midportion of the left  ureter measures 12 mm in length and 8 mm in transverse diameter.  There is a 9 mm stone in the left upper pole renal pelvis.  These result in mild left hydronephrosis.  Few other nonobstructing renal stones bilaterally.  Bladder decompressed and not well evaluated.  Uterus not seen.  No adnexal mass.    Stomach/bowel: No evidence of bowel obstruction. Normal appendix. Colonic diverticulosis with mild wall thickening and pericolonic stranding along a short segment descending colon.  There is an additional small site of less extensive inflammation involving a diverticulum along the sigmoid colon (series 4, image 71).    Lymph nodes/peritoneum: No pathologically enlarged lymph node identified. Trace ascites near the inflamed segment of descending colon.  No pneumoperitoneum or drainable fluid collection.    Vasculature: Normal abdominal aortic caliber.    Abdominal wall: Minimal postsurgical change in the lower anterior abdominal wall.    Lung bases: No consolidation or pleural effusion.    Musculoskeletal: No acute osseous findings. Interbody implants at L4-5 and L5-S1.                        Preliminary result by Russ Otto MD (10/14/22 01:44:30)                   Narrative:    START OF REPORT:  Technique: CT of the abdomen and pelvis was performed with axial images as well as sagittal and coronal reconstruction images with intravenous contrast.    Comparison: None available.    Clinical History: LLQ abdominal pain.    Dosage Information: Automated Exposure Control was utilized.    Findings:  Thorax:  Lungs: Streaky opacity is present at the visualized lung bases, consistent with nonspecific dependent changes scarring and atelectasis.  Pleura: No effusions or thickening.  Heart: The heart size is within normal limits.  Abdomen:  Abdominal Wall: No abdominal wall pathology is seen.  Liver: The liver appears unremarkable.  Biliary System: No extrahepatic biliary duct dilatation is seen.  Gallbladder: The  gallbladder appears unremarkable.  Pancreas: The pancreas appears unremarkable.  Spleen: The spleen appears unremarkable.  Adrenals: The adrenal glands appear unremarkable.  Kidneys: There is mild left hydroureteronephrosis secondary to an 8 mm obstructing calculus in the proximal ureter (series 2, image 50 and series 4, image 55). There is associated periureteral fat stranding. There is also mild mucosal wall thickening of the left proximal ureter which may reflect an element of pyeloureteritis. Multiple nonobstructing bilateral renal calculi are seen which measure 3-4 mm, with three stones in the lower pole of the right kidney and a single stone in the mid pole of the left kidney.  Aorta: There is mild calcification of the abdominal aorta and its branches.  IVC: Unremarkable.  Bowel:  Esophagus: The visualized esophagus appears unremarkable.  Stomach: The stomach appears unremarkable.  Duodenum: Unremarkable appearing duodenum.  Small Bowel: The small bowel appears unremarkable.  Colon: There are multiple colonic diverticula. There is an inflamed diverticulum in the mid descending colon (series 2, image 55) with surrounding fat stranding. This is consistent with diverticulitis. No evidence of perforation or abscess.  Appendix: The appendix appears unremarkable.  Peritoneum: No intraperitoneal free air or ascites is seen.    Pelvis: Calcific densities are seen in the pelvis, likely representing phleboliths.  Bladder: The bladder is nondistended and cannot be definitively evaluated.  Female:  Uterus: The uterus is surgically absent.  Ovaries: No adnexal masses are seen.    Bony structures:  Dorsal Spine: There is mild spondylosis of the visualized dorsal spine. Interbody cage devices are seen at L4-L5 and L5-S1.      Impression:  1. There is mild left hydroureteronephrosis secondary to an 8 mm obstructing calculus in the proximal ureter (series 2, image 50 and series 4, image 55). There is also mild mucosal wall  thickening of the left proximal ureter which may reflect an element of pyeloureteritis. Correlate with clinical and laboratory findings as regards additional evaluation and follow-up.  2. There is an inflamed diverticulum in the mid descending colon (series 2, image 55) with surrounding fat stranding. This is consistent with diverticulitis. No evidence of perforation or abscess.  3. Details as above.                          Preliminary result by Russ Otto MD (10/14/22 01:44:30)                   Narrative:    START OF REPORT:  Technique: CT of the abdomen and pelvis was performed with axial images as well as sagittal and coronal reconstruction images with intravenous contrast.    Comparison: None available.    Clinical History: LLQ abdominal pain.    Dosage Information: Automated Exposure Control was utilized.    Findings:  Thorax:  Lungs: Streaky opacity is present at the visualized lung bases, consistent with nonspecific dependent changes scarring and atelectasis.  Pleura: No effusions or thickening.  Heart: The heart size is within normal limits.  Abdomen:  Abdominal Wall: No abdominal wall pathology is seen.  Liver: The liver appears unremarkable.  Biliary System: No extrahepatic biliary duct dilatation is seen.  Gallbladder: The gallbladder appears unremarkable.  Pancreas: The pancreas appears unremarkable.  Spleen: The spleen appears unremarkable.  Adrenals: The adrenal glands appear unremarkable.  Kidneys: There is mild left hydroureteronephrosis secondary to an 8 mm obstructing calculus in the proximal ureter (series 2, image 50 and series 4, image 55). There is associated periureteral fat stranding. There is also mild mucosal wall thickening of the left proximal ureter which may reflect an element of pyeloureteritis. Multiple nonobstructing bilateral renal calculi are seen which measure 3-4 mm, with three stones in the lower pole of the right kidney and a single stone in the mid pole of the left  kidney.  Aorta: There is mild calcification of the abdominal aorta and its branches.  IVC: Unremarkable.  Bowel:  Esophagus: The visualized esophagus appears unremarkable.  Stomach: The stomach appears unremarkable.  Duodenum: Unremarkable appearing duodenum.  Small Bowel: The small bowel appears unremarkable.  Colon: There are multiple colonic diverticula. There is an inflamed diverticulum in the mid descending colon (series 2, image 55) with surrounding fat stranding. This is consistent with diverticulitis. No evidence of perforation or abscess.  Appendix: The appendix appears unremarkable.  Peritoneum: No intraperitoneal free air or ascites is seen.    Pelvis: Calcific densities are seen in the pelvis, likely representing phleboliths.  Bladder: The bladder is nondistended and cannot be definitively evaluated.  Female:  Uterus: The uterus is surgically absent.  Ovaries: No adnexal masses are seen.    Bony structures:  Dorsal Spine: There is mild spondylosis of the visualized dorsal spine. Interbody cage devices are seen at L4-L5 and L5-S1.      Impression:  1. There is mild left hydroureteronephrosis secondary to an 8 mm obstructing calculus in the proximal ureter (series 2, image 50 and series 4, image 55). There is also mild mucosal wall thickening of the left proximal ureter which may reflect an element of pyeloureteritis. Correlate with clinical and laboratory findings as regards additional evaluation and follow-up.  2. There is an inflamed diverticulum in the mid descending colon (series 2, image 55) with surrounding fat stranding. This is consistent with diverticulitis. No evidence of perforation or abscess.  3. Details as above.                                          Assessment:   Left ureteral calculi   Diverticulitis      Plan:   Patient is clinically stable.  From a urologic standpoint she can be discharged at the primary team's discretion.  Patient has no insurance which will make definitive stone  treatment difficult as an outpatient.  Before discharge I would have the  see her and set up an appointment with [facility] for definitive stone management.  No additional urologic recommendations   I did have a long discussion with the patient regarding her stent.  She does understand he that this stent is not a permanent stent.  It must be removed.  If it is not removed and if her stones are not treated in this timely fashion she can be at a high risk for sepsis, pyelonephritis, need for open surgery, loss of the kidney, death.  Because she has no insurance she will be following up in a different clinic, and she does understand that the stent must be removed in a timely fashion.    Anderson Nova MD

## 2022-10-15 NOTE — DISCHARGE SUMMARY
[facility] Hospital Medicine Discharge Summary    Admit Date: 10/13/2022  Discharge Date and Time: 10/15/73142:52 PM  Admitting Physician: Hospitalist team   Discharging Physician: Amadeo Gore MD.  Primary Care Physician: No primary care provider on file.      Discharge Diagnoses:  8 mm left obstructing ureteral stone with left hydroureteronephrosis  Acute diverticulitis  Essential hypertension     Hospital Course:    56 y.o. White female with a past medical history of diverticulosis, kidney stones, chronic back pain, hypertension and hyperlipidemia. The patient presented to [facility] on 10/13/2022 with a primary complaint of abdominal pain.  Patient reports experiencing left lower abdominal/flank pain on 10/08/2022.  She reports pain with similar to a prior diverticulosis episode and therefore she began a clear liquid diet which usually will resolve on its own. She states pain worsened and began to radiate to the right lower quadrant and to the left back.  She reports associated symptoms of nausea and 5-6 episodes of diarrhea day. She denies using stool softeners or laxative use. She denies complaints of fever, cough, chest pain, shortness a breath, vomiting, hematuria. Patient reports after going to CT she feels as if she has some chest tightness with wheezing. She normally uses albuterol at home. She is not on any medication for hypertension or hyperlipidemia.      Upon presentation to the ED, temperature 99.1° F, blood pressure 190/76 and SpO2 97% on room air.  Labs notable for WBC 12.4 otherwise overall unremarkable.  UA with 6 WBC, 1+ leukocyte esterase 1+ occult blood.  Preliminary CT abdomen pelvis revealed mild left hydroureteronephrosis secondary to an 8 mm obstructing callus in the proximal ureter, mild mucosal wall thickening of the left proximal of urinary reflecting element of pyeloureteritis an inflamed diverticulum in the mid descending colon with surrounding fat  "stranding consistent with diverticulitis, no evidence of perforation or abscess.  While in ED patient received morphine, Zofran, Compazine, Cipro and Flagyl.  Patient is admitted to hospital medicine services and further medical management.  Patient was taken to the OR on 10/14 for cystoscopy and ureteral stent placement.  Ureteral obstruction was relieved     Interval Hx:  Patient doing okay this morning.  She is urinating freely.  Still some abdominal pain but stable.  Tolerating some clear liquids morning.  No diarrhea.  In we discussed her procedure from yesterday.  She states that she did not take urology was able to get all of the stones removed and may need subsequent lithotripsy as an outpatient. Will DC home with oral antibiotics for 9 more days and prn toradol for pain.  Patietn request rx for anxiety medicine.  Will give trial of xanax but will need to be refilled through pcp. Otherwise stable to go home    Vitals:  Blood pressure (!) 166/75, pulse 78, temperature 97.9 °F (36.6 °C), resp. rate 18, height 5' 2.01" (1.575 m), weight 73.9 kg (163 lb), SpO2 100 %, not currently breastfeeding..    Physical Exam:  Awake, Alert, Oriented x 3, No new focal Neurologic deficit, Normal Affect  NC/AT, PERRLA, EOMI  Supple neck, no JVD, No cervical lymphadenopathy  Symmetrical chest, Good air entry bilaterally. No rhonchi, wheezes, crackles appreciated  RRR, No gallop, rub or murmur  +ve Bowel sounds x4, Abd soft and non tender, no rebound, guarding or rigidity  No Cyanosis, cludding or edema, No new rash or bruises    Procedures Performed: No admission procedures for hospital encounter.     Significant Diagnostic Studies: See Full reports for all details  Admission on 10/13/2022   Component Date Value    Sodium Level 10/13/2022 137     Potassium Level 10/13/2022 4.4     Chloride 10/13/2022 102     Carbon Dioxide 10/13/2022 23     Glucose Level 10/13/2022 100     Blood Urea Nitrogen 10/13/2022 12.0     Creatinine " 10/13/2022 0.63     Calcium Level Total 10/13/2022 9.8     Protein Total 10/13/2022 7.7     Albumin Level 10/13/2022 4.2     Globulin 10/13/2022 3.5     Albumin/Globulin Ratio 10/13/2022 1.2     Bilirubin Total 10/13/2022 0.6     Alkaline Phosphatase 10/13/2022 67     Alanine Aminotransferase 10/13/2022 24     Aspartate Aminotransfera* 10/13/2022 22     eGFR 10/13/2022 >60     Color, UA 10/13/2022 Yellow     Appearance, UA 10/13/2022 Clear     Specific Gravity, UA 10/13/2022 1.010     pH, UA 10/13/2022 5.5     Protein, UA 10/13/2022 Negative     Glucose, UA 10/13/2022 Negative     Ketones, UA 10/13/2022 Negative     Blood, UA 10/13/2022 1+ (A)     Bilirubin, UA 10/13/2022 Negative     Urobilinogen, UA 10/13/2022 0.2     Nitrites, UA 10/13/2022 Negative     Leukocyte Esterase, UA 10/13/2022 1+ (A)     WBC 10/13/2022 12.4 (H)     RBC 10/13/2022 5.15     Hgb 10/13/2022 13.6     Hct 10/13/2022 43.2     MCV 10/13/2022 83.9     MCH 10/13/2022 26.4 (L)     MCHC 10/13/2022 31.5 (L)     RDW 10/13/2022 12.7     Platelet 10/13/2022 382     MPV 10/13/2022 9.0     Neut % 10/13/2022 69.6     Lymph % 10/13/2022 22.4     Mono % 10/13/2022 6.3     Eos % 10/13/2022 1.0     Basophil % 10/13/2022 0.4     Lymph # 10/13/2022 2.77     Neut # 10/13/2022 8.6     Mono # 10/13/2022 0.78     Eos # 10/13/2022 0.12     Baso # 10/13/2022 0.05     IG# 10/13/2022 0.04     IG% 10/13/2022 0.3     NRBC% 10/13/2022 0.0     Lipase Level 10/13/2022 58     RBC, UA 10/13/2022 <5     WBC, UA 10/13/2022 6 (H)     Squamous Epithelial Cell* 10/13/2022 <5     Bacteria, UA 10/13/2022 None Seen     SARS COV-2 MOLECULAR 10/14/2022 Negative     Sodium Level 10/15/2022 139     Potassium Level 10/15/2022 3.8     Chloride 10/15/2022 109 (H)     Carbon Dioxide 10/15/2022 23     Glucose Level 10/15/2022 166 (H)     Blood Urea Nitrogen 10/15/2022 11.0     Creatinine 10/15/2022 0.69     Calcium Level Total 10/15/2022 8.7     Protein Total 10/15/2022 5.4 (L)     Albumin  Level 10/15/2022 2.9 (L)     Globulin 10/15/2022 2.5     Albumin/Globulin Ratio 10/15/2022 1.2     Bilirubin Total 10/15/2022 0.3     Alkaline Phosphatase 10/15/2022 46     Alanine Aminotransferase 10/15/2022 16     Aspartate Aminotransfera* 10/15/2022 15     eGFR 10/15/2022 >60     WBC 10/15/2022 6.3     RBC 10/15/2022 3.79 (L)     Hgb 10/15/2022 10.1 (L)     Hct 10/15/2022 32.3 (L)     MCV 10/15/2022 85.2     MCH 10/15/2022 26.6 (L)     MCHC 10/15/2022 31.3 (L)     RDW 10/15/2022 12.8     Platelet 10/15/2022 270     MPV 10/15/2022 9.6     Neut % 10/15/2022 46.6     Lymph % 10/15/2022 42.0     Mono % 10/15/2022 7.7     Eos % 10/15/2022 2.8     Basophil % 10/15/2022 0.6     Lymph # 10/15/2022 2.66     Neut # 10/15/2022 2.9     Mono # 10/15/2022 0.49     Eos # 10/15/2022 0.18     Baso # 10/15/2022 0.04     IG# 10/15/2022 0.02     IG% 10/15/2022 0.3     NRBC% 10/15/2022 0.0         Microbiology Results (last 7 days)       ** No results found for the last 168 hours. **             CT Abdomen Pelvis With Contrast    Result Date: 10/14/2022  EXAMINATION: CT ABDOMEN PELVIS WITH CONTRAST CLINICAL HISTORY: LLQ abdominal pain; TECHNIQUE: CT imaging of the abdomen and pelvis after the administration of 100 mL of Isovue 370 intravenous contrast. Dose length product 812 mGycm. Automatic exposure control, adjustment of mA/kV or iterative reconstruction technique used to limit radiation dose. COMPARISON: No relevant comparison studies available at the time of dictation. FINDINGS: Liver/biliary: Mild generalized hepatic steatosis.  No radiodense gallstones. No intra or extrahepatic biliary ductal dilation. Pancreas: Normal. Spleen: Normal. Adrenals: Normal. 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  A stone in the proximal to midportion of the left ureter measures 12 mm in length and 8 mm in transverse diameter.  There is a 9 mm stone in the left upper pole renal pelvis.  These result in mild  left hydronephrosis.  Few other nonobstructing renal stones bilaterally.  Bladder decompressed and not well evaluated.  Uterus not seen.  No adnexal mass. Stomach/bowel: No evidence of bowel obstruction. Normal appendix. Colonic diverticulosis with mild wall thickening and pericolonic stranding along a short segment descending colon.  There is an additional small site of less extensive inflammation involving a diverticulum along the sigmoid colon (series 4, image 71). Lymph nodes/peritoneum: No pathologically enlarged lymph node identified. Trace ascites near the inflamed segment of descending colon.  No pneumoperitoneum or drainable fluid collection. Vasculature: Normal abdominal aortic caliber. Abdominal wall: Minimal postsurgical change in the lower anterior abdominal wall. Lung bases: No consolidation or pleural effusion. Musculoskeletal: No acute osseous findings. Interbody implants at L4-5 and L5-S1.     1. Two suspected areas of uncomplicated colonic diverticulitis. 2. Two left renal pelvis/ureteral stones with mild left hydronephrosis. No significant discrepancy between my interpretation and the preliminary radiology report. Electronically signed by: Russ Otto Date:    10/14/2022 Time:    08:52    SURG [facility] Surgery Fluoro Usage    Result Date: 10/14/2022  See OP Notes for results. IMPRESSION: See OP Notes for results. This procedure was auto-finalized by: Virtual Radiologist  - pulls last radiology orders        Medication List        START taking these medications      ALPRAZolam 0.5 MG tablet  Commonly known as: XANAX  Take 1 tablet (0.5 mg total) by mouth 3 (three) times daily as needed for Anxiety.     ciprofloxacin HCl 500 MG tablet  Commonly known as: CIPRO  Take 1 tablet (500 mg total) by mouth every 8 (eight) hours. for 9 days     ketorolac 10 mg tablet  Commonly known as: TORADOL  Take 1 tablet (10 mg total) by mouth every 6 (six) hours. for 5 days     metroNIDAZOLE 500 MG tablet  Commonly known  as: FLAGYL  Take 1 tablet (500 mg total) by mouth every 8 (eight) hours. for 9 days               Where to Get Your Medications        These medications were sent to [id number] [address] AT [facility] & [address] [address]      Hours: 24-hours Phone: [phone]   ALPRAZolam 0.5 MG tablet  ciprofloxacin HCl 500 MG tablet  ketorolac 10 mg tablet  metroNIDAZOLE 500 MG tablet          Explained in detail to the patient about the discharge plan, medications, and follow-up visits. Pt understands and agrees with the treatment plan  Discharged Condition: stable  Diet: regular  Disposition: home    Medications Per DC med rec  Activities as tolerated  Follow up with your PCP in 2 wks   For further questions contact hospitalist office    Discharge time 33 minutes    For worsening symptoms, chest pain, shortness of breath, increased abdominal pain, high grade fever, stroke or stroke like symptoms, immediately go to the nearest Emergency Room or call 911 as soon as possible.      Amadeo Camacho M.D, on 10/15/2022. at 4:52 PM.

## 2022-10-15 NOTE — PLAN OF CARE
Problem: Adult Inpatient Plan of Care  Goal: Plan of Care Review  Outcome: Ongoing, Progressing  Flowsheets (Taken 10/15/2022 0919)  Plan of Care Reviewed With: patient  Goal: Patient-Specific Goal (Individualized)  Outcome: Ongoing, Progressing  Goal: Absence of Hospital-Acquired Illness or Injury  Outcome: Ongoing, Progressing  Intervention: Identify and Manage Fall Risk  Flowsheets (Taken 10/15/2022 0919)  Safety Promotion/Fall Prevention:   assistive device/personal item within reach   side rails raised x 3   medications reviewed   lighting adjusted  Intervention: Prevent Skin Injury  Flowsheets (Taken 10/15/2022 0919)  Body Position:   neutral body alignment   neutral head position  Intervention: Prevent and Manage VTE (Venous Thromboembolism) Risk  Flowsheets (Taken 10/15/2022 0919)  Activity Management: Ambulated to bathroom - L4  VTE Prevention/Management: fluids promoted  Intervention: Prevent Infection  Flowsheets (Taken 10/15/2022 0919)  Infection Prevention:   rest/sleep promoted   single patient room provided  Goal: Optimal Comfort and Wellbeing  Outcome: Ongoing, Progressing  Intervention: Monitor Pain and Promote Comfort  Flowsheets (Taken 10/15/2022 0919)  Pain Management Interventions:   relaxation techniques promoted   quiet environment facilitated  Intervention: Provide Person-Centered Care  Flowsheets (Taken 10/15/2022 0919)  Trust Relationship/Rapport:   care explained   questions encouraged  Goal: Readiness for Transition of Care  Outcome: Ongoing, Progressing

## 2022-10-15 NOTE — PROGRESS NOTES
[facility]  Hospital Medicine Progress Note        Chief Complaint: Inpatient Follow-up for abdominal pain    HPI:    56 y.o. White female with a past medical history of diverticulosis, kidney stones, chronic back pain, hypertension and hyperlipidemia. The patient presented to [facility] on 10/13/2022 with a primary complaint of abdominal pain.  Patient reports experiencing left lower abdominal/flank pain on 10/08/2022.  She reports pain with similar to a prior diverticulosis episode and therefore she began a clear liquid diet which usually will resolve on its own. She states pain worsened and began to radiate to the right lower quadrant and to the left back.  She reports associated symptoms of nausea and 5-6 episodes of diarrhea day. She denies using stool softeners or laxative use. She denies complaints of fever, cough, chest pain, shortness a breath, vomiting, hematuria. Patient reports after going to CT she feels as if she has some chest tightness with wheezing. She normally uses albuterol at home. She is not on any medication for hypertension or hyperlipidemia.      Upon presentation to the ED, temperature 99.1° F, blood pressure 190/76 and SpO2 97% on room air.  Labs notable for WBC 12.4 otherwise overall unremarkable.  UA with 6 WBC, 1+ leukocyte esterase 1+ occult blood.  Preliminary CT abdomen pelvis revealed mild left hydroureteronephrosis secondary to an 8 mm obstructing callus in the proximal ureter, mild mucosal wall thickening of the left proximal of urinary reflecting element of pyeloureteritis an inflamed diverticulum in the mid descending colon with surrounding fat stranding consistent with diverticulitis, no evidence of perforation or abscess.  While in ED patient received morphine, Zofran, Compazine, Cipro and Flagyl.  Patient is admitted to hospital medicine services and further medical management.  Patient was taken to the OR on 10/14 for cystoscopy and ureteral stent  placement.  Ureteral obstruction was relieved    Interval Hx:  Patient doing okay this morning.  She is urinating freely.  Still some abdominal pain but stable.  Tolerating some clear liquids morning.  No diarrhea.  In we discussed her procedure from yesterday.  She states that she did not take urology was able to get all of the stones removed and may need subsequent lithotripsy as an outpatient.    Objective/physical exam:  General: In no acute distress, afebrile  Chest: Clear to auscultation bilaterally  Heart: RRR, +S1, S2, no appreciable murmur  Abdomen: Soft, nontender, BS +  MSK: Warm, no lower extremity edema, no clubbing or cyanosis  Neurologic: Alert and oriented x4, Cranial nerve II-XII intact, Strength 5/5 in all 4 extremities    VITAL SIGNS: 24 HRS MIN & MAX LAST   Temp  Min: 97.6 °F (36.4 °C)  Max: 98.3 °F (36.8 °C) 97.6 °F (36.4 °C)   BP  Min: 107/63  Max: 158/75 134/80   Pulse  Min: 61  Max: 86  61   Resp  Min: 13  Max: 20 18   SpO2  Min: 94 %  Max: 100 % 98 %       Recent Labs   Lab 10/13/22  2014 10/15/22  0412   WBC 12.4* 6.3   RBC 5.15 3.79*   HGB 13.6 10.1*   HCT 43.2 32.3*   MCV 83.9 85.2   MCH 26.4* 26.6*   MCHC 31.5* 31.3*   RDW 12.7 12.8    270   MPV 9.0 9.6       Recent Labs   Lab 10/13/22  2014 10/15/22  0412    139   K 4.4 3.8   CO2 23 23   BUN 12.0 11.0   CREATININE 0.63 0.69   CALCIUM 9.8 8.7   ALBUMIN 4.2 2.9*   ALKPHOS 67 46   ALT 24 16   AST 22 15   BILITOT 0.6 0.3          Microbiology Results (last 7 days)       ** No results found for the last 168 hours. **             See below for Radiology    Scheduled Med:   ciprofloxacin  400 mg Intravenous Q12H    metronidazole  500 mg Intravenous Q8H        Continuous Infusions:   sodium chloride 0.9% 100 mL/hr at 10/14/22 1719        PRN Meds:  acetaminophen, acetaminophen, dextrose 10%, dextrose 10%, glucagon (human recombinant), glucose, glucose, ketorolac, morphine, ondansetron, sodium chloride 0.9%       Assessment/Plan:    8 mm left obstructing ureteral stone with left hydroureteronephrosis  Acute diverticulitis  Essential hypertension      Leukocytosis back to normal.  No further signs of any type of sepsis.    She remains on IV Cipro and Flagyl for diverticulitis.  Would like to switch over to oral today if okay with Urology in no further intervention planned during his hospitalization.    Encourage her to ambulate as well.    Pain is well controlled with Toradol.  She states the morphine makes her too nauseated.    I reviewed the surgical notes from yesterday.  No mention of incomplete procedure.  Will follow-up further Neurology recommendations but suspect the patient will be able to go home in the next 24-48 hours    Patient condition:  Stable    Anticipated discharge and Disposition: TBD      All diagnosis and differential diagnosis have been reviewed; assessment and plan has been documented; I have personally reviewed the labs and test results that are presently available; I have reviewed the patients medication list; I have reviewed the consulting providers response and recommendations. I have reviewed or attempted to review medical records based upon their availability    All of the patient's questions have been  addressed and answered. Patient's is agreeable to the above stated plan. I will continue to monitor closely and make adjustments to medical management as needed.  _____________________________________________________________________      Radiology:  SURG FL Surgery Fluoro Usage  See OP Notes for results.     IMPRESSION: See OP Notes for results.     This procedure was auto-finalized by: Virtual Radiologist  CT Abdomen Pelvis With Contrast  Narrative: EXAMINATION:  CT ABDOMEN PELVIS WITH CONTRAST    CLINICAL HISTORY:  LLQ abdominal pain;    TECHNIQUE:  CT imaging of the abdomen and pelvis after the administration of 100 mL of Isovue 370 intravenous contrast. Dose length product 812 mGycm. Automatic exposure control,  adjustment of mA/kV or iterative reconstruction technique used to limit radiation dose.    COMPARISON:  No relevant comparison studies available at the time of dictation.    FINDINGS:  Liver/biliary: Mild generalized hepatic steatosis.  No radiodense gallstones. No intra or extrahepatic biliary ductal dilation.    Pancreas: Normal.    Spleen: Normal.    Adrenals: Normal.    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  A stone in the proximal to midportion of the left ureter measures 12 mm in length and 8 mm in transverse diameter.  There is a 9 mm stone in the left upper pole renal pelvis.  These result in mild left hydronephrosis.  Few other nonobstructing renal stones bilaterally.  Bladder decompressed and not well evaluated.  Uterus not seen.  No adnexal mass.    Stomach/bowel: No evidence of bowel obstruction. Normal appendix. Colonic diverticulosis with mild wall thickening and pericolonic stranding along a short segment descending colon.  There is an additional small site of less extensive inflammation involving a diverticulum along the sigmoid colon (series 4, image 71).    Lymph nodes/peritoneum: No pathologically enlarged lymph node identified. Trace ascites near the inflamed segment of descending colon.  No pneumoperitoneum or drainable fluid collection.    Vasculature: Normal abdominal aortic caliber.    Abdominal wall: Minimal postsurgical change in the lower anterior abdominal wall.    Lung bases: No consolidation or pleural effusion.    Musculoskeletal: No acute osseous findings. Interbody implants at L4-5 and L5-S1.  Impression: 1. Two suspected areas of uncomplicated colonic diverticulitis.  2. Two left renal pelvis/ureteral stones with mild left hydronephrosis.  No significant discrepancy between my interpretation and the preliminary radiology report.    Electronically signed by: Russ Otto  Date:    10/14/2022  Time:    08:52      Amadeo Gore MD   10/15/2022

## 2022-10-17 ENCOUNTER — HOSPITAL ENCOUNTER (EMERGENCY)
Facility: HOSPITAL | Age: 57
Discharge: ANOTHER HOSPITAL | End: 2022-10-18
Attending: FAMILY MEDICINE
Payer: MEDICAID

## 2022-10-17 DIAGNOSIS — N20.0 KIDNEY STONE: ICD-10-CM

## 2022-10-17 DIAGNOSIS — N39.0 URINARY TRACT INFECTION WITHOUT HEMATURIA, SITE UNSPECIFIED: ICD-10-CM

## 2022-10-17 DIAGNOSIS — R10.9 FLANK PAIN: Primary | ICD-10-CM

## 2022-10-17 LAB
ALBUMIN SERPL-MCNC: 4.1 GM/DL (ref 3.5–5)
ALBUMIN/GLOB SERPL: 1.2 RATIO (ref 1.1–2)
ALP SERPL-CCNC: 63 UNIT/L (ref 40–150)
ALT SERPL-CCNC: 25 UNIT/L (ref 0–55)
APPEARANCE UR: ABNORMAL
AST SERPL-CCNC: 26 UNIT/L (ref 5–34)
BACTERIA #/AREA URNS AUTO: ABNORMAL /HPF
BASOPHILS # BLD AUTO: 0.05 X10(3)/MCL (ref 0–0.2)
BASOPHILS NFR BLD AUTO: 0.6 %
BILIRUB UR QL STRIP.AUTO: NEGATIVE MG/DL
BILIRUBIN DIRECT+TOT PNL SERPL-MCNC: 0.2 MG/DL
BUN SERPL-MCNC: 15.8 MG/DL (ref 9.8–20.1)
CALCIUM SERPL-MCNC: 10.1 MG/DL (ref 8.4–10.2)
CHLORIDE SERPL-SCNC: 106 MMOL/L (ref 98–107)
CO2 SERPL-SCNC: 26 MMOL/L (ref 22–29)
COLOR UR AUTO: ABNORMAL
CREAT SERPL-MCNC: 0.78 MG/DL (ref 0.55–1.02)
EOSINOPHIL # BLD AUTO: 0.4 X10(3)/MCL (ref 0–0.9)
EOSINOPHIL NFR BLD AUTO: 4.6 %
ERYTHROCYTE [DISTWIDTH] IN BLOOD BY AUTOMATED COUNT: 12.3 % (ref 11.5–17)
GFR SERPLBLD CREATININE-BSD FMLA CKD-EPI: >60 MLS/MIN/1.73/M2
GLOBULIN SER-MCNC: 3.4 GM/DL (ref 2.4–3.5)
GLUCOSE SERPL-MCNC: 78 MG/DL (ref 74–100)
GLUCOSE UR QL STRIP.AUTO: ABNORMAL MG/DL
HCT VFR BLD AUTO: 43.1 % (ref 37–47)
HGB BLD-MCNC: 13.1 GM/DL (ref 12–16)
HYALINE CASTS #/AREA URNS LPF: ABNORMAL /LPF
IMM GRANULOCYTES # BLD AUTO: 0.03 X10(3)/MCL (ref 0–0.04)
IMM GRANULOCYTES NFR BLD AUTO: 0.3 %
KETONES UR QL STRIP.AUTO: NEGATIVE MG/DL
LACTATE SERPL-SCNC: 0.8 MMOL/L (ref 0.5–2.2)
LEUKOCYTE ESTERASE UR QL STRIP.AUTO: 250 UNIT/L
LYMPHOCYTES # BLD AUTO: 2.88 X10(3)/MCL (ref 0.6–4.6)
LYMPHOCYTES NFR BLD AUTO: 33.4 %
MCH RBC QN AUTO: 26.1 PG (ref 27–31)
MCHC RBC AUTO-ENTMCNC: 30.4 MG/DL (ref 33–36)
MCV RBC AUTO: 86 FL (ref 80–94)
MONOCYTES # BLD AUTO: 0.6 X10(3)/MCL (ref 0.1–1.3)
MONOCYTES NFR BLD AUTO: 7 %
MUCOUS THREADS URNS QL MICRO: ABNORMAL /LPF
NEUTROPHILS # BLD AUTO: 4.7 X10(3)/MCL (ref 2.1–9.2)
NEUTROPHILS NFR BLD AUTO: 54.1 %
NITRITE UR QL STRIP.AUTO: ABNORMAL
NRBC BLD AUTO-RTO: 0 %
PH UR STRIP.AUTO: 6 [PH]
PLATELET # BLD AUTO: 431 X10(3)/MCL (ref 130–400)
PMV BLD AUTO: 9.4 FL (ref 7.4–10.4)
POTASSIUM SERPL-SCNC: 4.4 MMOL/L (ref 3.5–5.1)
PROT SERPL-MCNC: 7.5 GM/DL (ref 6.4–8.3)
PROT UR QL STRIP.AUTO: 2 MG/DL
RBC # BLD AUTO: 5.01 X10(6)/MCL (ref 4.2–5.4)
RBC #/AREA URNS AUTO: >100 /HPF
RBC UR QL AUTO: 3 UNIT/L
SODIUM SERPL-SCNC: 145 MMOL/L (ref 136–145)
SP GR UR STRIP.AUTO: 1.02
SQUAMOUS #/AREA URNS LPF: ABNORMAL /HPF
UROBILINOGEN UR STRIP-ACNC: 1 MG/DL
WBC # SPEC AUTO: 8.6 X10(3)/MCL (ref 4.5–11.5)
WBC #/AREA URNS AUTO: ABNORMAL /HPF

## 2022-10-17 PROCEDURE — 85025 COMPLETE CBC W/AUTO DIFF WBC: CPT | Performed by: PHYSICIAN ASSISTANT

## 2022-10-17 PROCEDURE — 96375 TX/PRO/DX INJ NEW DRUG ADDON: CPT

## 2022-10-17 PROCEDURE — 80053 COMPREHEN METABOLIC PANEL: CPT | Performed by: PHYSICIAN ASSISTANT

## 2022-10-17 PROCEDURE — 83605 ASSAY OF LACTIC ACID: CPT | Performed by: PHYSICIAN ASSISTANT

## 2022-10-17 PROCEDURE — 63600175 PHARM REV CODE 636 W HCPCS: Performed by: PHYSICIAN ASSISTANT

## 2022-10-17 PROCEDURE — 99285 EMERGENCY DEPT VISIT HI MDM: CPT | Mod: 25

## 2022-10-17 PROCEDURE — 96365 THER/PROPH/DIAG IV INF INIT: CPT | Mod: 59

## 2022-10-17 PROCEDURE — 81001 URINALYSIS AUTO W/SCOPE: CPT | Performed by: PHYSICIAN ASSISTANT

## 2022-10-17 PROCEDURE — 36415 COLL VENOUS BLD VENIPUNCTURE: CPT | Performed by: PHYSICIAN ASSISTANT

## 2022-10-17 RX ORDER — MORPHINE SULFATE 2 MG/ML
4 INJECTION, SOLUTION INTRAMUSCULAR; INTRAVENOUS
Status: COMPLETED | OUTPATIENT
Start: 2022-10-17 | End: 2022-10-17

## 2022-10-17 RX ORDER — ONDANSETRON 2 MG/ML
4 INJECTION INTRAMUSCULAR; INTRAVENOUS
Status: COMPLETED | OUTPATIENT
Start: 2022-10-17 | End: 2022-10-17

## 2022-10-17 RX ADMIN — MORPHINE SULFATE 4 MG: 2 INJECTION, SOLUTION INTRAMUSCULAR; INTRAVENOUS at 09:10

## 2022-10-17 RX ADMIN — ONDANSETRON 4 MG: 2 INJECTION INTRAMUSCULAR; INTRAVENOUS at 09:10

## 2022-10-17 RX ADMIN — IOHEXOL 100 ML: 350 INJECTION, SOLUTION INTRAVENOUS at 11:10

## 2022-10-18 ENCOUNTER — HOSPITAL ENCOUNTER (OUTPATIENT)
Facility: HOSPITAL | Age: 57
LOS: 1 days | Discharge: HOME | End: 2022-10-19
Attending: INTERNAL MEDICINE | Admitting: INTERNAL MEDICINE
Payer: MEDICAID

## 2022-10-18 VITALS
WEIGHT: 169 LBS | SYSTOLIC BLOOD PRESSURE: 183 MMHG | DIASTOLIC BLOOD PRESSURE: 101 MMHG | HEART RATE: 78 BPM | BODY MASS INDEX: 31.1 KG/M2 | OXYGEN SATURATION: 99 % | HEIGHT: 62 IN | RESPIRATION RATE: 18 BRPM | TEMPERATURE: 98 F

## 2022-10-18 DIAGNOSIS — N39.0 UTI (URINARY TRACT INFECTION): ICD-10-CM

## 2022-10-18 DIAGNOSIS — N20.0 KIDNEY STONE: Primary | ICD-10-CM

## 2022-10-18 DIAGNOSIS — R07.9 CHEST PAIN: ICD-10-CM

## 2022-10-18 LAB
ALBUMIN SERPL-MCNC: 3.9 GM/DL (ref 3.5–5)
ALBUMIN/GLOB SERPL: 1.3 RATIO (ref 1.1–2)
ALP SERPL-CCNC: 58 UNIT/L (ref 40–150)
ALT SERPL-CCNC: 26 UNIT/L (ref 0–55)
AST SERPL-CCNC: 23 UNIT/L (ref 5–34)
BASOPHILS # BLD AUTO: 0.05 X10(3)/MCL (ref 0–0.2)
BASOPHILS NFR BLD AUTO: 0.6 %
BILIRUBIN DIRECT+TOT PNL SERPL-MCNC: 0.3 MG/DL
BUN SERPL-MCNC: 10.6 MG/DL (ref 9.8–20.1)
CALCIUM SERPL-MCNC: 9.4 MG/DL (ref 8.4–10.2)
CHLORIDE SERPL-SCNC: 104 MMOL/L (ref 98–107)
CO2 SERPL-SCNC: 23 MMOL/L (ref 22–29)
CREAT SERPL-MCNC: 0.67 MG/DL (ref 0.55–1.02)
EOSINOPHIL # BLD AUTO: 0.2 X10(3)/MCL (ref 0–0.9)
EOSINOPHIL NFR BLD AUTO: 2.6 %
ERYTHROCYTE [DISTWIDTH] IN BLOOD BY AUTOMATED COUNT: 12.6 % (ref 11.5–17)
GFR SERPLBLD CREATININE-BSD FMLA CKD-EPI: >60 MLS/MIN/1.73/M2
GLOBULIN SER-MCNC: 3.1 GM/DL (ref 2.4–3.5)
GLUCOSE SERPL-MCNC: 154 MG/DL (ref 74–100)
HCT VFR BLD AUTO: 40.3 % (ref 37–47)
HGB BLD-MCNC: 12.6 GM/DL (ref 12–16)
IMM GRANULOCYTES # BLD AUTO: 0.03 X10(3)/MCL (ref 0–0.04)
IMM GRANULOCYTES NFR BLD AUTO: 0.4 %
LYMPHOCYTES # BLD AUTO: 1.41 X10(3)/MCL (ref 0.6–4.6)
LYMPHOCYTES NFR BLD AUTO: 18.2 %
MAGNESIUM SERPL-MCNC: 2 MG/DL (ref 1.6–2.6)
MCH RBC QN AUTO: 26.4 PG (ref 27–31)
MCHC RBC AUTO-ENTMCNC: 31.3 MG/DL (ref 33–36)
MCV RBC AUTO: 84.3 FL (ref 80–94)
MONOCYTES # BLD AUTO: 0.35 X10(3)/MCL (ref 0.1–1.3)
MONOCYTES NFR BLD AUTO: 4.5 %
NEUTROPHILS # BLD AUTO: 5.7 X10(3)/MCL (ref 2.1–9.2)
NEUTROPHILS NFR BLD AUTO: 73.7 %
NRBC BLD AUTO-RTO: 0 %
PHOSPHATE SERPL-MCNC: 3.6 MG/DL (ref 2.3–4.7)
PLATELET # BLD AUTO: 385 X10(3)/MCL (ref 130–400)
PMV BLD AUTO: 9.3 FL (ref 7.4–10.4)
POTASSIUM SERPL-SCNC: 4 MMOL/L (ref 3.5–5.1)
PROT SERPL-MCNC: 7 GM/DL (ref 6.4–8.3)
RBC # BLD AUTO: 4.78 X10(6)/MCL (ref 4.2–5.4)
SARS-COV-2 RDRP RESP QL NAA+PROBE: NEGATIVE
SODIUM SERPL-SCNC: 137 MMOL/L (ref 136–145)
WBC # SPEC AUTO: 7.7 X10(3)/MCL (ref 4.5–11.5)

## 2022-10-18 PROCEDURE — 85025 COMPLETE CBC W/AUTO DIFF WBC: CPT | Performed by: NURSE PRACTITIONER

## 2022-10-18 PROCEDURE — 25000003 PHARM REV CODE 250: Performed by: NURSE PRACTITIONER

## 2022-10-18 PROCEDURE — 63600175 PHARM REV CODE 636 W HCPCS: Performed by: INTERNAL MEDICINE

## 2022-10-18 PROCEDURE — 25000003 PHARM REV CODE 250: Performed by: FAMILY MEDICINE

## 2022-10-18 PROCEDURE — 87040 BLOOD CULTURE FOR BACTERIA: CPT | Performed by: NURSE PRACTITIONER

## 2022-10-18 PROCEDURE — G0378 HOSPITAL OBSERVATION PER HR: HCPCS

## 2022-10-18 PROCEDURE — S0030 INJECTION, METRONIDAZOLE: HCPCS | Performed by: PHYSICIAN ASSISTANT

## 2022-10-18 PROCEDURE — 25000003 PHARM REV CODE 250: Performed by: INTERNAL MEDICINE

## 2022-10-18 PROCEDURE — 63600175 PHARM REV CODE 636 W HCPCS: Performed by: PHYSICIAN ASSISTANT

## 2022-10-18 PROCEDURE — 63600175 PHARM REV CODE 636 W HCPCS: Performed by: FAMILY MEDICINE

## 2022-10-18 PROCEDURE — 80053 COMPREHEN METABOLIC PANEL: CPT | Performed by: NURSE PRACTITIONER

## 2022-10-18 PROCEDURE — 96368 THER/DIAG CONCURRENT INF: CPT

## 2022-10-18 PROCEDURE — 96365 THER/PROPH/DIAG IV INF INIT: CPT

## 2022-10-18 PROCEDURE — 63600175 PHARM REV CODE 636 W HCPCS: Performed by: NURSE PRACTITIONER

## 2022-10-18 PROCEDURE — 36415 COLL VENOUS BLD VENIPUNCTURE: CPT | Performed by: NURSE PRACTITIONER

## 2022-10-18 PROCEDURE — 25000003 PHARM REV CODE 250: Performed by: PHYSICIAN ASSISTANT

## 2022-10-18 PROCEDURE — 25000003 PHARM REV CODE 250: Performed by: SPECIALIST

## 2022-10-18 PROCEDURE — 84100 ASSAY OF PHOSPHORUS: CPT | Performed by: NURSE PRACTITIONER

## 2022-10-18 PROCEDURE — 25500020 PHARM REV CODE 255: Performed by: FAMILY MEDICINE

## 2022-10-18 PROCEDURE — 83735 ASSAY OF MAGNESIUM: CPT | Performed by: NURSE PRACTITIONER

## 2022-10-18 PROCEDURE — A4216 STERILE WATER/SALINE, 10 ML: HCPCS | Performed by: NURSE PRACTITIONER

## 2022-10-18 PROCEDURE — S0030 INJECTION, METRONIDAZOLE: HCPCS | Performed by: FAMILY MEDICINE

## 2022-10-18 PROCEDURE — 87635 SARS-COV-2 COVID-19 AMP PRB: CPT | Performed by: FAMILY MEDICINE

## 2022-10-18 PROCEDURE — 96366 THER/PROPH/DIAG IV INF ADDON: CPT

## 2022-10-18 RX ORDER — PROCHLORPERAZINE EDISYLATE 5 MG/ML
5 INJECTION INTRAMUSCULAR; INTRAVENOUS EVERY 6 HOURS PRN
Status: DISCONTINUED | OUTPATIENT
Start: 2022-10-18 | End: 2022-10-19 | Stop reason: HOSPADM

## 2022-10-18 RX ORDER — BUTALBITAL, ACETAMINOPHEN AND CAFFEINE 50; 325; 40 MG/1; MG/1; MG/1
1 TABLET ORAL EVERY 4 HOURS PRN
Status: DISCONTINUED | OUTPATIENT
Start: 2022-10-18 | End: 2022-10-19 | Stop reason: HOSPADM

## 2022-10-18 RX ORDER — ACETAMINOPHEN 325 MG/1
650 TABLET ORAL EVERY 4 HOURS PRN
Status: DISCONTINUED | OUTPATIENT
Start: 2022-10-18 | End: 2022-10-19 | Stop reason: HOSPADM

## 2022-10-18 RX ORDER — HYDRALAZINE HYDROCHLORIDE 20 MG/ML
10 INJECTION INTRAMUSCULAR; INTRAVENOUS EVERY 6 HOURS PRN
Status: DISCONTINUED | OUTPATIENT
Start: 2022-10-18 | End: 2022-10-19 | Stop reason: HOSPADM

## 2022-10-18 RX ORDER — PHENAZOPYRIDINE HYDROCHLORIDE 100 MG/1
100 TABLET, FILM COATED ORAL
Status: DISCONTINUED | OUTPATIENT
Start: 2022-10-18 | End: 2022-10-19 | Stop reason: HOSPADM

## 2022-10-18 RX ORDER — POLYETHYLENE GLYCOL 3350 17 G/17G
17 POWDER, FOR SOLUTION ORAL 2 TIMES DAILY PRN
Status: DISCONTINUED | OUTPATIENT
Start: 2022-10-18 | End: 2022-10-19 | Stop reason: HOSPADM

## 2022-10-18 RX ORDER — AMOXICILLIN 250 MG
1 CAPSULE ORAL 2 TIMES DAILY PRN
Status: DISCONTINUED | OUTPATIENT
Start: 2022-10-18 | End: 2022-10-19 | Stop reason: HOSPADM

## 2022-10-18 RX ORDER — METRONIDAZOLE 500 MG/100ML
500 INJECTION, SOLUTION INTRAVENOUS
Status: COMPLETED | OUTPATIENT
Start: 2022-10-18 | End: 2022-10-18

## 2022-10-18 RX ORDER — HYDROMORPHONE HYDROCHLORIDE 2 MG/ML
0.5 INJECTION, SOLUTION INTRAMUSCULAR; INTRAVENOUS; SUBCUTANEOUS EVERY 4 HOURS PRN
Status: DISCONTINUED | OUTPATIENT
Start: 2022-10-18 | End: 2022-10-19 | Stop reason: HOSPADM

## 2022-10-18 RX ORDER — METRONIDAZOLE 500 MG/100ML
500 INJECTION, SOLUTION INTRAVENOUS
Status: DISCONTINUED | OUTPATIENT
Start: 2022-10-18 | End: 2022-10-19

## 2022-10-18 RX ORDER — HYDRALAZINE HYDROCHLORIDE 20 MG/ML
10 INJECTION INTRAMUSCULAR; INTRAVENOUS EVERY 4 HOURS PRN
Status: DISCONTINUED | OUTPATIENT
Start: 2022-10-18 | End: 2022-10-18

## 2022-10-18 RX ORDER — HYDROCODONE BITARTRATE AND ACETAMINOPHEN 7.5; 325 MG/1; MG/1
1 TABLET ORAL EVERY 4 HOURS PRN
Status: DISCONTINUED | OUTPATIENT
Start: 2022-10-18 | End: 2022-10-19 | Stop reason: HOSPADM

## 2022-10-18 RX ORDER — AMLODIPINE BESYLATE 5 MG/1
5 TABLET ORAL DAILY
Status: DISCONTINUED | OUTPATIENT
Start: 2022-10-18 | End: 2022-10-19 | Stop reason: HOSPADM

## 2022-10-18 RX ORDER — SODIUM CHLORIDE, SODIUM LACTATE, POTASSIUM CHLORIDE, CALCIUM CHLORIDE 600; 310; 30; 20 MG/100ML; MG/100ML; MG/100ML; MG/100ML
INJECTION, SOLUTION INTRAVENOUS CONTINUOUS
Status: DISCONTINUED | OUTPATIENT
Start: 2022-10-18 | End: 2022-10-19 | Stop reason: HOSPADM

## 2022-10-18 RX ORDER — MAG HYDROX/ALUMINUM HYD/SIMETH 200-200-20
30 SUSPENSION, ORAL (FINAL DOSE FORM) ORAL EVERY 4 HOURS PRN
Status: DISCONTINUED | OUTPATIENT
Start: 2022-10-18 | End: 2022-10-19 | Stop reason: HOSPADM

## 2022-10-18 RX ORDER — CIPROFLOXACIN 2 MG/ML
400 INJECTION, SOLUTION INTRAVENOUS
Status: COMPLETED | OUTPATIENT
Start: 2022-10-18 | End: 2022-10-18

## 2022-10-18 RX ORDER — SODIUM CHLORIDE 0.9 % (FLUSH) 0.9 %
10 SYRINGE (ML) INJECTION
Status: DISCONTINUED | OUTPATIENT
Start: 2022-10-18 | End: 2022-10-19 | Stop reason: HOSPADM

## 2022-10-18 RX ORDER — LABETALOL HYDROCHLORIDE 5 MG/ML
10 INJECTION, SOLUTION INTRAVENOUS EVERY 6 HOURS PRN
Status: DISCONTINUED | OUTPATIENT
Start: 2022-10-18 | End: 2022-10-19 | Stop reason: HOSPADM

## 2022-10-18 RX ORDER — ONDANSETRON 2 MG/ML
4 INJECTION INTRAMUSCULAR; INTRAVENOUS EVERY 4 HOURS PRN
Status: DISCONTINUED | OUTPATIENT
Start: 2022-10-18 | End: 2022-10-19 | Stop reason: HOSPADM

## 2022-10-18 RX ORDER — ALPRAZOLAM 0.5 MG/1
0.5 TABLET ORAL 3 TIMES DAILY PRN
Status: DISCONTINUED | OUTPATIENT
Start: 2022-10-18 | End: 2022-10-19 | Stop reason: HOSPADM

## 2022-10-18 RX ORDER — TAMSULOSIN HYDROCHLORIDE 0.4 MG/1
0.4 CAPSULE ORAL NIGHTLY
Status: DISCONTINUED | OUTPATIENT
Start: 2022-10-18 | End: 2022-10-19 | Stop reason: HOSPADM

## 2022-10-18 RX ORDER — BISACODYL 10 MG
10 SUPPOSITORY, RECTAL RECTAL DAILY PRN
Status: DISCONTINUED | OUTPATIENT
Start: 2022-10-18 | End: 2022-10-19 | Stop reason: HOSPADM

## 2022-10-18 RX ORDER — HYOSCYAMINE SULFATE 0.12 MG/1
0.12 TABLET SUBLINGUAL EVERY 4 HOURS PRN
Status: DISCONTINUED | OUTPATIENT
Start: 2022-10-18 | End: 2022-10-19 | Stop reason: HOSPADM

## 2022-10-18 RX ORDER — ONDANSETRON 2 MG/ML
4 INJECTION INTRAMUSCULAR; INTRAVENOUS EVERY 6 HOURS PRN
Status: DISCONTINUED | OUTPATIENT
Start: 2022-10-18 | End: 2022-10-18

## 2022-10-18 RX ORDER — MORPHINE SULFATE 4 MG/ML
2 INJECTION, SOLUTION INTRAMUSCULAR; INTRAVENOUS EVERY 4 HOURS PRN
Status: DISCONTINUED | OUTPATIENT
Start: 2022-10-18 | End: 2022-10-19 | Stop reason: HOSPADM

## 2022-10-18 RX ORDER — BUTALBITAL, ACETAMINOPHEN AND CAFFEINE 50; 325; 40 MG/1; MG/1; MG/1
1 TABLET ORAL EVERY 4 HOURS PRN
Status: DISCONTINUED | OUTPATIENT
Start: 2022-10-18 | End: 2022-10-18 | Stop reason: SDUPTHER

## 2022-10-18 RX ORDER — ONDANSETRON 2 MG/ML
INJECTION INTRAMUSCULAR; INTRAVENOUS
Status: DISPENSED
Start: 2022-10-18 | End: 2022-10-18

## 2022-10-18 RX ORDER — MORPHINE SULFATE 2 MG/ML
4 INJECTION, SOLUTION INTRAMUSCULAR; INTRAVENOUS
Status: COMPLETED | OUTPATIENT
Start: 2022-10-18 | End: 2022-10-18

## 2022-10-18 RX ADMIN — HYDRALAZINE HYDROCHLORIDE 10 MG: 20 INJECTION INTRAMUSCULAR; INTRAVENOUS at 09:10

## 2022-10-18 RX ADMIN — CEFTRIAXONE SODIUM 1 G: 1 INJECTION, POWDER, FOR SOLUTION INTRAMUSCULAR; INTRAVENOUS at 09:10

## 2022-10-18 RX ADMIN — Medication 10 ML: at 03:10

## 2022-10-18 RX ADMIN — METRONIDAZOLE 500 MG: 5 INJECTION, SOLUTION INTRAVENOUS at 01:10

## 2022-10-18 RX ADMIN — BUTALBITAL, ACETAMINOPHEN, AND CAFFEINE 1 TABLET: 50; 325; 40 TABLET ORAL at 11:10

## 2022-10-18 RX ADMIN — PHENAZOPYRIDINE HYDROCHLORIDE 100 MG: 100 TABLET ORAL at 06:10

## 2022-10-18 RX ADMIN — HYDROCODONE BITARTRATE AND ACETAMINOPHEN 1 TABLET: 7.5; 325 TABLET ORAL at 08:10

## 2022-10-18 RX ADMIN — CIPROFLOXACIN 400 MG: 2 INJECTION, SOLUTION INTRAVENOUS at 01:10

## 2022-10-18 RX ADMIN — MORPHINE SULFATE 4 MG: 2 INJECTION, SOLUTION INTRAMUSCULAR; INTRAVENOUS at 01:10

## 2022-10-18 RX ADMIN — SODIUM CHLORIDE, POTASSIUM CHLORIDE, SODIUM LACTATE AND CALCIUM CHLORIDE: 600; 310; 30; 20 INJECTION, SOLUTION INTRAVENOUS at 06:10

## 2022-10-18 RX ADMIN — ONDANSETRON 4 MG: 2 INJECTION INTRAMUSCULAR; INTRAVENOUS at 11:10

## 2022-10-18 RX ADMIN — ONDANSETRON 4 MG: 2 INJECTION INTRAMUSCULAR; INTRAVENOUS at 05:10

## 2022-10-18 RX ADMIN — LABETALOL HYDROCHLORIDE 10 MG: 5 INJECTION, SOLUTION INTRAVENOUS at 03:10

## 2022-10-18 RX ADMIN — AMLODIPINE BESYLATE 5 MG: 5 TABLET ORAL at 12:10

## 2022-10-18 RX ADMIN — TAMSULOSIN HYDROCHLORIDE 0.4 MG: 0.4 CAPSULE ORAL at 08:10

## 2022-10-18 RX ADMIN — METRONIDAZOLE 500 MG: 5 INJECTION, SOLUTION INTRAVENOUS at 05:10

## 2022-10-18 RX ADMIN — METRONIDAZOLE 500 MG: 5 INJECTION, SOLUTION INTRAVENOUS at 09:10

## 2022-10-18 NOTE — H&P
[facility]  Hospital Medicine History & Physical Examination       Patient Name: Michelle Foote  MRN: [id number]  Patient Class: IP- Inpatient   Admission Date: 10/18/2022   Admitting Physician: Dusty Neri MD   Length of Stay: 0  Attending Physician: Dusty Neri MD   Primary Care Provider: Primary Doctor No  Face-to-Face encounter date: 10/18/2022  Code Status: Full Code   Chief Complaint: Transfer for urology services         Patient information was obtained from patient, patient's family, past medical records and ER records.     HISTORY OF PRESENT ILLNESS:   Michelle Foote is a 56 y.o. female with a past medical history of essential hypertension, hyperlipidemia, diverticulosis, kidney stones, and chronic back pain presented to [facility] on 10/18/2022 transferred from [facility] for urology services. Patient presents to [facility] on 10/17 with complaint of worsening left flank and left lower quadrant abdominal pain.  Patient reports she had recent admission on 10/14 for diverticulitis and kidney stone.  During admission patient had left stent placement by Urology.  Patient reports she was discharged on 10/15 with Cipro and Flagyl.  Patient reports continued left flank pain, left lower abdominal pain, hematuria and nausea, prompting her to [facility] yesterday.  On exam patient complains left flank and lower pain.  Patient, vomiting, fever, chills, dysuria, chest pain, and shortness of breath.    Labs at outside facility revealed WBC 8.6, BUN 15.8,  creatinine 0.7, and lactic acid 0.8.  UA revealed 3+ occult blood, 1+ nitrite,  250 leukocytes, and trace bacteria.  Patient was given dose of IV Cipro and Flagyl.  CT abdomen and pelvis showed both ureteral stents with interval resolution of hydronephrosis; There is interval cranial migration of the previously present left ureteropelvic junction stone, now located in the upper pole calyx and measures 9 mm in diameter, a 4 mm stone is seen in the proximal  left ureter.  Diverticuli improved compared to previous CT scan.  Patient was transferred to [facility] for urology services.  Patient was admitted to hospital medicine service for further medical management.   PAST MEDICAL HISTORY:   Essential hypertension  Hyperlipidemia  Diverticulosis   Kidney stents   Chronic back pain    PAST SURGICAL HISTORY:   Hysterectomy     Back surgery    ALLERGIES:   Rifampin and Latex, natural rubber    FAMILY HISTORY:   Mother:  COPD, atrial fibrillation, cardiovascular disease  Father:   due to brain tumor    SOCIAL HISTORY:   Tobacco - Denies  Alcohol - Occasionally  Illicit Drugs - Denies       HOME MEDICATIONS:     Prior to Admission medications    Medication Sig Start Date End Date Taking? Authorizing Provider   ciprofloxacin HCl (CIPRO) 500 MG tablet Take 1 tablet (500 mg total) by mouth every 8 (eight) hours. for 9 days 10/15/22 10/24/22 Yes Amadeo Gore MD   ketorolac (TORADOL) 10 mg tablet Take 1 tablet (10 mg total) by mouth every 6 (six) hours. for 5 days 10/15/22 10/20/22 Yes Amadeo Gore MD   metroNIDAZOLE (FLAGYL) 500 MG tablet Take 1 tablet (500 mg total) by mouth every 8 (eight) hours. for 9 days 10/15/22 10/24/22 Yes Amadeo Gore MD   ALPRAZolam (XANAX) 0.5 MG tablet Take 1 tablet (0.5 mg total) by mouth 3 (three) times daily as needed for Anxiety. 10/15/22 10/20/22  Amadeo Gore MD       REVIEW OF SYSTEMS:   Except as documented, all other systems reviewed and negative     PHYSICAL EXAM:     VITAL SIGNS: 24 HRS MIN & MAX LAST   Temp  Min: 97.9 °F (36.6 °C)  Max: 98.4 °F (36.9 °C) 97.9 °F (36.6 °C)   BP  Min: 166/98  Max: 183/101 (!) 166/98     Pulse  Min: 71  Max: 92  73   Resp  Min: 14  Max: 18 18   SpO2  Min: 97 %  Max: 99 % 97 %       General appearance: Female  in no apparent distress.  HEENNT: Atraumatic head. Moist mucous membranes of oral cavity.   Lungs: Clear to auscultation bilaterally.    Heart: Regular rate and rhythm.    Abdomen: Soft,  non-distended.  Left CVA tenderness and lower quadrant abdominal tenderness.  Bowel sounds are normal.   Extremities: No cyanosis, clubbing, or edema. No deformities.   Skin: No Rash. Warm and dry.   Neuro: Awake, alert, and oriented. Motor and sensory exams grossly intact.   Psych/mental status: Appropriate mood and affect. Responds appropriately to questions.     LABS AND IMAGING:     Recent Labs   Lab 10/13/22  2014 10/15/22  0412 10/17/22  2118   WBC 12.4* 6.3 8.6   RBC 5.15 3.79* 5.01   HGB 13.6 10.1* 13.1   HCT 43.2 32.3* 43.1   MCV 83.9 85.2 86.0   MCH 26.4* 26.6* 26.1*   MCHC 31.5* 31.3* 30.4*   RDW 12.7 12.8 12.3    270 431*   MPV 9.0 9.6 9.4       Recent Labs   Lab 10/13/22  2014 10/15/22  0412 10/17/22  2118    139 145   K 4.4 3.8 4.4   CO2 23 23 26   BUN 12.0 11.0 15.8   CREATININE 0.63 0.69 0.78   CALCIUM 9.8 8.7 10.1   ALBUMIN 4.2 2.9* 4.1   ALKPHOS 67 46 63   ALT 24 16 25   AST 22 15 26   BILITOT 0.6 0.3 0.2       Microbiology Results (last 7 days)       Procedure Component Value Units Date/Time    Blood Culture [[id number]] Collected: 10/18/22 0656    Order Status: Sent Specimen: Blood Updated: 10/18/22 0722    Blood Culture [[id number]] Collected: 10/18/22 0656    Order Status: Sent Specimen: Blood Updated: 10/18/22 0722             CT Abdomen Pelvis With Contrast  START OF REPORT:  Technique: CT of the abdomen and pelvis was performed with axial images as well as sagittal and coronal reconstruction images with intravenous contrast but without oral contrast.    Comparison: Comparison is with study dated2022-10-14 01:38:26.    Clinical History: Pt in with complaints of left sided flank pain. Pt had stents placed to the kidney on Saturday but the pain is getting worse. Pt was also diagnosed with diverticulitis and given antibiotics.    Dosage Information: Automated Exposure Control was utilized.    Findings:  Lines and Tubes: None.  Thorax:  Lungs: The visualized lung bases appear  unremarkable.  Pleura: No pleural effusion is seen.  Heart: The heart size is within normal limits.  Abdomen:  Abdominal Wall: No abdominal wall pathology is seen.  Liver: The liver appears unremarkable.  Biliary System: No extrahepatic biliary duct dilatation is seen.  Gallbladder: The gallbladder appears unremarkable.  Pancreas: The pancreas appears unremarkable.  Spleen: The spleen appears unremarkable.  Adrenals: The adrenal glands appear unremarkable.  Kidneys: Again noted is a 4 mm nonobstructing stone in the mid pole of the right kidney (series 2 image 43). 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 There is interval placement of two left ureteral stents with distal end of the one of the stents in the upper pole calyx and another in the pelvis of the lower moiety. There is interval resolution of the hydronephrosis of the upper moiety and partial resolution of the hydronephrosis of the lower moiety. There is interval cranial migration of the previously present left ureteropelvic junction stone, now located in the upper pole calyx and measures 9 mm in diameter. The 4 mm nonobstructing stone in the lower pole calyx of the left kidney is unchanged since the prior examination. A 4 mm stone is seen in the proximal left ureter (series 2 image 50).  Aorta: The abdominal aorta appears unremarkable.  IVC: Unremarkable.  Bowel:  Esophagus: The visualized esophagus appears unremarkable.  Stomach: The stomach appears unremarkable.  Duodenum: Unremarkable appearing duodenum.  Small Bowel: The small bowel appears unremarkable.  Colon: Multiple diverticula are seen in the transverse through to the sigmoid colon. There is interval near complete resolution of the wall thickening and surrounding fat stranding of the previously present inflamed diverticulum at the level of mid descending colon (series 2 image 56).  Appendix: The appendix appears unremarkable.  Peritoneum: No intraperitoneal free air or  ascites is seen.    Pelvis:  Bladder: The bladder is nondistended and cannot be definitively evaluated. A hodges catheter is in place.  Female:  Uterus: The uterus is surgically absent.  Ovaries: No adnexal masses are seen.    Bony structures:  Dorsal Spine: There is mild spondylosis of the visualized dorsal spine. Again noted is anterior fusion of L4-L5 and L5-S1 with interbody fusion device in place.    Impression:  1. Multiple diverticula are seen in the transverse through to the sigmoid colon. There is interval near complete resolution of the wall thickening and surrounding fat stranding of the previously present inflamed diverticulum at the level of mid descending colon (series 2 image 56).  2. There is interval placement of two left ureteral stents with distal end of the one of the stents in the upper pole calyx and another in the pelvis of the lower moiety. There is interval resolution of the hydronephrosis of the upper moiety and partial resolution of the hydronephrosis of the lower moiety. There is interval cranial migration of the previously present left ureteropelvic junction stone, now located in the upper pole calyx and measures 9 mm in diameter. A 4 mm stone is seen in the proximal left ureter (series 2 image 50).  3. Details as above.        ASSESSMENT & PLAN:   Assessment:    Uretric colic  9 mm left upper pole calyx and 4 mm stone in proximal left ureter  Recently diagnosed Diverticulitis, improving  Essential hypertension  History of hyperlipidemia, chronic back pain    Plan:  Urology consulted, appreciate recommendations  IV Flagyl   IV Rocephin  LR at 125 mL/hr  NPO   PRN pain medications and antiemetics  Continue appropriate home medications once patient no longer NPO   Labs in a.m.      VTE Prophylaxis: will be placed on SCD for DVT prophylaxis and will be advised to be as mobile as possible and sit in a chair as  tolerated      __________________________________________________________________________  INPATIENT LIST OF MEDICATIONS     Scheduled Meds:   ondansetron         Continuous Infusions:   lactated ringers 125 mL/hr at 10/18/22 0611     PRN Meds:.acetaminophen, acetaminophen, aluminum-magnesium hydroxide-simethicone, bisacodyL, hydrALAZINE, HYDROmorphone, morphine, ondansetron, polyethylene glycol, prochlorperazine, senna-docusate 8.6-50 mg, sodium chloride 0.9%, trazodone      I, Bret Suero PA-C, have reviewed and discussed the case with Dr. Dusty Neri MD   Please see the following addendum for further assessment and plan from there attending MD.    10/18/2022    ________________________________________________________________________________    MD Addendum:    I Dr. Chapo Neri performed substantive portion of the visit, personally performed a face to face evaluation of the patient and have reviewed and agree with NP/PA documentation, treatment and plan & MDM.     A 55 yo WW with recent diagnosis of diverticultiits, kidney stones s/p stenting presented with worsning uretric colic symptoms. When seen at bedside she was in mild distress and complains of headache. She does not take any medications at home on a regular basis. Agree with HPI, exam, Plan mentioned above. Will continue antibioitcs and follow cultures. Add Amlodipine for BP. Hope her BP improves once iv fluids are discontinued. She currently is NPO and awaiting urology evaluation. Added Fioricet to hep with headache. Antiemetics as needed. Check lipid profile for am and add statins if apt.         Dusty Neri MD   10/18/2022

## 2022-10-18 NOTE — PLAN OF CARE
Problem: Adult Inpatient Plan of Care  Goal: Plan of Care Review  Outcome: Ongoing, Progressing  Goal: Patient-Specific Goal (Individualized)  Outcome: Ongoing, Progressing  Goal: Absence of Hospital-Acquired Illness or Injury  Outcome: Ongoing, Progressing  Goal: Optimal Comfort and Wellbeing  Outcome: Ongoing, Progressing  Goal: Readiness for Transition of Care  Outcome: Ongoing, Progressing      no

## 2022-10-18 NOTE — PLAN OF CARE
Problem: Adult Inpatient Plan of Care  Goal: Plan of Care Review  Outcome: Ongoing, Progressing  Flowsheets (Taken 10/18/2022 1237)  Plan of Care Reviewed With: patient  Goal: Patient-Specific Goal (Individualized)  Outcome: Ongoing, Progressing  Goal: Absence of Hospital-Acquired Illness or Injury  Outcome: Ongoing, Progressing  Intervention: Identify and Manage Fall Risk  Flowsheets (Taken 10/18/2022 1237)  Safety Promotion/Fall Prevention:   assistive device/personal item within reach   nonskid shoes/socks when out of bed   side rails raised x 2  Intervention: Prevent Skin Injury  Flowsheets (Taken 10/18/2022 1237)  Body Position: position changed independently  Skin Protection: adhesive use limited  Intervention: Prevent and Manage VTE (Venous Thromboembolism) Risk  Flowsheets (Taken 10/18/2022 1237)  Activity Management: Ambulated to bathroom - L4  VTE Prevention/Management: ambulation promoted  Range of Motion:   active ROM (range of motion) encouraged   ROM (range of motion) performed  Intervention: Prevent Infection  Flowsheets (Taken 10/18/2022 1237)  Infection Prevention:   rest/sleep promoted   single patient room provided  Goal: Optimal Comfort and Wellbeing  Outcome: Ongoing, Progressing  Intervention: Monitor Pain and Promote Comfort  Flowsheets (Taken 10/18/2022 1237)  Pain Management Interventions:   pain management plan reviewed with patient/caregiver   medication offered   pillow support provided   position adjusted  Intervention: Provide Person-Centered Care  Flowsheets (Taken 10/18/2022 1237)  Trust Relationship/Rapport: care explained  Goal: Readiness for Transition of Care  Outcome: Ongoing, Progressing

## 2022-10-18 NOTE — CONSULTS
Michelle Foote 1965  [id number]  10/18/2022    CONSULTING PHYSICIAN: Bret NIETO    Reason for consult:  Recent ureteral stent placement due to 9 mm stone    HPI:  Ms. Foote is a 56-year-old female who had 2 ureteral stents placed on 10/14/2022 by Dr. Nova for an obstructing 9 mm stone.  Patient was discharged on 10/15/2022 with Cipro and Flagyl, nothing for pain or stent discomfort. Reports back to [facility] ER yesterday for continued left-sided flank pain, hematuria and nausea.  Repeat abdominal/pelvic CT on 10/17/2022 shows interval placement of 2 left ureteral stents with no significant residual collecting system dilation, 7 mm transverse diameter stone in the proximal left ureter adjacent to the upper moiety stent. She was referred to [facility] for definitive stone treatment. She was transferred here for Urology services.       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    Social History     Tobacco Use    Smoking status: Never    Smokeless tobacco: Never   Substance Use Topics    Alcohol use: Yes     Alcohol/week: 2.0 standard drinks     Types: 1 Glasses of wine, 1 Cans of beer per week     Comment: social drink    Drug use: Never     Current Facility-Administered Medications   Medication Dose Route Frequency Provider Last Rate Last Admin    acetaminophen tablet 650 mg  650 mg Oral Q4H PRN EDITH Albarran        acetaminophen tablet 650 mg  650 mg Oral Q4H PRN EDITH Albarran        aluminum-magnesium hydroxide-simethicone 200-200-20 mg/5 mL suspension 30 mL  30 mL Oral Q4H PRN EDITH Albarran        bisacodyL suppository 10 mg  10 mg Rectal  "Daily PRN Sandy CERRATO Ty, FNP        cefTRIAXone (ROCEPHIN) 1 g in dextrose 5 % in water (D5W) 5 % 50 mL IVPB (MB+)  1 g Intravenous Q24H Bret Suero PA-C   Stopped at 10/18/22 0950    hydrALAZINE injection 10 mg  10 mg Intravenous Q4H PRN Sandy Crawford, FNP   10 mg at 10/18/22 0916    HYDROmorphone (PF) injection 0.5 mg  0.5 mg Intravenous Q4H PRN Sandy CERRATO Ty, FNP        lactated ringers infusion   Intravenous Continuous Sandy Crawford,  mL/hr at 10/18/22 0611 New Bag at 10/18/22 0611    metronidazole IVPB 500 mg  500 mg Intravenous Q8H Bret Suero PA-C 100 mL/hr at 10/18/22 0935 500 mg at 10/18/22 0935    morphine injection 2 mg  2 mg Intravenous Q4H PRN Sandy Nietoch, FNP        ondansetron 4 mg/2 mL injection             ondansetron injection 4 mg  4 mg Intravenous Q4H PRN Sandy CERRATO Ty, FNP        polyethylene glycol packet 17 g  17 g Oral BID PRN Sandy CERRATO Ty, FNP        prochlorperazine injection Soln 5 mg  5 mg Intravenous Q6H PRN Sandy Crawford, FNP        senna-docusate 8.6-50 mg per tablet 1 tablet  1 tablet Oral BID PRN Sandy CERRATO Ty, FNP        sodium chloride 0.9% flush 10 mL  10 mL Intravenous PRN Sandy CERRATO Ty, FNP        trazodone split tablet 25 mg  25 mg Oral Nightly PRN Sandy Crawford, FNP         Review of patient's allergies indicates:   Allergen Reactions    Rifampin Hives    Latex, natural rubber Rash     ROS: 12 point review of systems negative other than the HPI    PHYSICAL EXAM:  Vitals:    10/18/22 0500 10/18/22 0600 10/18/22 0717   BP: (!) 180/90  (!) 166/98   Pulse: 71  73   Resp: 18  18   Temp: 98 °F (36.7 °C)  97.9 °F (36.6 °C)   TempSrc: Oral  Oral   SpO2: 99%  97%   Weight: 76.6 kg (168 lb 15.7 oz) 75.7 kg (166 lb 14.4 oz)    Height: 5' 2" (1.575 m)       No intake or output data in the 24 hours ending 10/18/22 1102    GEN: WN/WD NAD  HEENT: NCAT, PERRLA, EOMI, OP clear, nares patent  CV: RRR  RESP: Even and unlabored  ABD: soft, NTND  : no CVA tenderness  EXT: no " C/C/E  NEURO: no focal deficits, MAEW, AAOx4      LABS:  Recent Results (from the past 24 hour(s))   Comprehensive metabolic panel    Collection Time: 10/17/22  9:18 PM   Result Value Ref Range    Sodium Level 145 136 - 145 mmol/L    Potassium Level 4.4 3.5 - 5.1 mmol/L    Chloride 106 98 - 107 mmol/L    Carbon Dioxide 26 22 - 29 mmol/L    Glucose Level 78 74 - 100 mg/dL    Blood Urea Nitrogen 15.8 9.8 - 20.1 mg/dL    Creatinine 0.78 0.55 - 1.02 mg/dL    Calcium Level Total 10.1 8.4 - 10.2 mg/dL    Protein Total 7.5 6.4 - 8.3 gm/dL    Albumin Level 4.1 3.5 - 5.0 gm/dL    Globulin 3.4 2.4 - 3.5 gm/dL    Albumin/Globulin Ratio 1.2 1.1 - 2.0 ratio    Bilirubin Total 0.2 <=1.5 mg/dL    Alkaline Phosphatase 63 40 - 150 unit/L    Alanine Aminotransferase 25 0 - 55 unit/L    Aspartate Aminotransferase 26 5 - 34 unit/L    eGFR >60 mls/min/1.73/m2   Urinalysis, Reflex to Urine Culture Urine, Clean Catch    Collection Time: 10/17/22  9:18 PM    Specimen: Urine   Result Value Ref Range    Color, UA Brown (A) Yellow, Light-Yellow, Dark Yellow, Charmaine, Straw    Appearance, UA Turbid (A) Clear    Specific Gravity, UA 1.025     pH, UA 6.0 5.0, 5.5, 6.0, 6.5, 7.0, 7.5, 8.0, 8.5    Protein, UA +2 (A) Negative mg/dL    Glucose, UA Trace (A) Negative, Normal mg/dL    Ketones, UA Negative Negative mg/dL    Blood, UA +3 (A) Negative unit/L    Bilirubin, UA Negative Negative mg/dL    Urobilinogen, UA +1 (A) 0.2, 1.0, Normal mg/dL    Nitrites, UA 1+ (A) Negative    Leukocyte Esterase,  (A) Negative unit/L    WBC, UA 0-5 None Seen, 0-2, 3-5, 0-5 /HPF    Bacteria, UA Trace (A) None Seen, Rare, Occasional /HPF    Squamous Epithelial Cells, UA Trace /HPF    Mucous, UA None Seen None Seen /LPF    Hyaline Casts, UA None Seen /lpf    RBC, UA >100 None Seen, 0-2, 3-5, 0-5, >100 /HPF   Lactic acid, plasma    Collection Time: 10/17/22  9:18 PM   Result Value Ref Range    Lactic Acid Level 0.8 0.5 - 2.2 mmol/L   CBC with Differential     Collection Time: 10/17/22  9:18 PM   Result Value Ref Range    WBC 8.6 4.5 - 11.5 x10(3)/mcL    RBC 5.01 4.20 - 5.40 x10(6)/mcL    Hgb 13.1 12.0 - 16.0 gm/dL    Hct 43.1 37.0 - 47.0 %    MCV 86.0 80.0 - 94.0 fL    MCH 26.1 (L) 27.0 - 31.0 pg    MCHC 30.4 (L) 33.0 - 36.0 mg/dL    RDW 12.3 11.5 - 17.0 %    Platelet 431 (H) 130 - 400 x10(3)/mcL    MPV 9.4 7.4 - 10.4 fL    Neut % 54.1 %    Lymph % 33.4 %    Mono % 7.0 %    Eos % 4.6 %    Basophil % 0.6 %    Lymph # 2.88 0.6 - 4.6 x10(3)/mcL    Neut # 4.7 2.1 - 9.2 x10(3)/mcL    Mono # 0.60 0.1 - 1.3 x10(3)/mcL    Eos # 0.40 0 - 0.9 x10(3)/mcL    Baso # 0.05 0 - 0.2 x10(3)/mcL    IG# 0.03 0 - 0.04 x10(3)/mcL    IG% 0.3 %    NRBC% 0.0 %   COVID-19 Rapid Screening    Collection Time: 10/18/22  2:19 AM   Result Value Ref Range    SARS COV-2 MOLECULAR Negative Negative   Comprehensive Metabolic Panel    Collection Time: 10/18/22  6:56 AM   Result Value Ref Range    Sodium Level 137 136 - 145 mmol/L    Potassium Level 4.0 3.5 - 5.1 mmol/L    Chloride 104 98 - 107 mmol/L    Carbon Dioxide 23 22 - 29 mmol/L    Glucose Level 154 (H) 74 - 100 mg/dL    Blood Urea Nitrogen 10.6 9.8 - 20.1 mg/dL    Creatinine 0.67 0.55 - 1.02 mg/dL    Calcium Level Total 9.4 8.4 - 10.2 mg/dL    Protein Total 7.0 6.4 - 8.3 gm/dL    Albumin Level 3.9 3.5 - 5.0 gm/dL    Globulin 3.1 2.4 - 3.5 gm/dL    Albumin/Globulin Ratio 1.3 1.1 - 2.0 ratio    Bilirubin Total 0.3 <=1.5 mg/dL    Alkaline Phosphatase 58 40 - 150 unit/L    Alanine Aminotransferase 26 0 - 55 unit/L    Aspartate Aminotransferase 23 5 - 34 unit/L    eGFR >60 mls/min/1.73/m2   Magnesium    Collection Time: 10/18/22  6:56 AM   Result Value Ref Range    Magnesium Level 2.00 1.60 - 2.60 mg/dL   Phosphorus    Collection Time: 10/18/22  6:56 AM   Result Value Ref Range    Phosphorus Level 3.6 2.3 - 4.7 mg/dL   CBC with Differential    Collection Time: 10/18/22  6:56 AM   Result Value Ref Range    WBC 7.7 4.5 - 11.5 x10(3)/mcL    RBC 4.78 4.20 -  5.40 x10(6)/mcL    Hgb 12.6 12.0 - 16.0 gm/dL    Hct 40.3 37.0 - 47.0 %    MCV 84.3 80.0 - 94.0 fL    MCH 26.4 (L) 27.0 - 31.0 pg    MCHC 31.3 (L) 33.0 - 36.0 mg/dL    RDW 12.6 11.5 - 17.0 %    Platelet 385 130 - 400 x10(3)/mcL    MPV 9.3 7.4 - 10.4 fL    Neut % 73.7 %    Lymph % 18.2 %    Mono % 4.5 %    Eos % 2.6 %    Basophil % 0.6 %    Lymph # 1.41 0.6 - 4.6 x10(3)/mcL    Neut # 5.7 2.1 - 9.2 x10(3)/mcL    Mono # 0.35 0.1 - 1.3 x10(3)/mcL    Eos # 0.20 0 - 0.9 x10(3)/mcL    Baso # 0.05 0 - 0.2 x10(3)/mcL    IG# 0.03 0 - 0.04 x10(3)/mcL    IG% 0.4 %    NRBC% 0.0 %         IMAGING:  FINDINGS:  Liver/biliary: Mild hepatic steatosis with small areas of sparing near the gallbladder fossa.  No radiodense gallstones. No intra or extrahepatic biliary ductal dilation.     Pancreas: Normal.     Spleen: Normal.     Adrenals: Normal.     Genitourinary: Interval placement of 2 left ureteral stents.  No significant collecting system dilatation.  7 mm stone in the proximal left ureter.  Bladder under distended with no definitive abnormality.     Stomach/bowel: Normal caliber small bowel and colon.  Normal appendix. Inflammatory changes adjacent to the descending colon and a short segment of sigmoid colon have moderately improved during the interval.     Lymph nodes/peritoneum: No pathologically enlarged lymph node identified. No pneumoperitoneum or significant ascites.  No organized fluid collection.     Vasculature: Normal abdominal aortic caliber.     Abdominal wall: Very mild postsurgical changes in the lower anterior abdominal wall.     Lung bases: No consolidation or pleural effusion.     Musculoskeletal: No acute osseous findings.     Impression:  1. Placement of 2 left ureteral stents since 10/14/2022 with no significant residual collecting system dilatation.  7 mm transverse diameter stone in the proximal left ureter adjacent to the upper moiety stent.  2. Areas of suspected diverticulitis involving the descending and  sigmoid colon show moderate improvement since prior CT.  No significant discrepancy between my interpretation and the preliminary radiology report.         ASSESSMENT:  Left sided flank pain  Hematuria; normal with stents in place      PLAN:  Pain and nausea control for now; recommend Flomax at bedtime for stent discomfort, Levsin as needed for spasms and Norco as needed for pain    Patient tells me that she was told by the [facility] ER staff that there was a rented laser machine here and she was being transferred for stone treatment. She is very upset about the transfer and medical bills piling up. I have spoken with the  and unit supervisor about the situation.     I spoke with Dr. Nova who placed her stents, he is not ready for definitive stone treatment at this time because the stents have not been in long enough for ureteral dilation, it would also be difficult to treat these stones via ureteroscopy with laser lithotripsy because of their placement in the kidney. Her information has been sent to [facility] however, she needs to be seen in the Urology clinic first.     The on call physician has been notified of current situation, awaiting response.       EDITH Cancino

## 2022-10-18 NOTE — ED PROVIDER NOTES
Encounter Date: 10/17/2022       History     Chief Complaint   Patient presents with    Flank Pain     Pt in with complaints of left sided flank pain.  Pt had stents placed to the kidney on Saturday but the pain is getting worse.  Pt was also diagnosed with diverticulitis and given antibiotics.     Patient reports to the ER with complaints of continued left flank and LLQ pain; pt was recently discharged from another ER for diverticulitis and a stone with subsequent stent placement     The history is provided by the patient.   Flank Pain  The current episode started more than 2 days ago. The problem occurs constantly. The problem has not changed since onset.Associated symptoms include abdominal pain. Pertinent negatives include no chest pain, no headaches and no shortness of breath.   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 No pertinent family history.  Social History     Tobacco Use    Smoking status: Never    Smokeless tobacco: Never   Substance Use Topics    Alcohol use: Yes     Alcohol/week: 2.0 standard drinks     Types: 1 Glasses of wine, 1 Cans of beer per week     Comment: social drink    Drug use: Never     Review of Systems   Constitutional:  Negative for fever.   HENT:  Negative for sore throat.    Eyes: Negative.    Respiratory:  Negative for shortness of breath.    Cardiovascular:  Negative for chest pain.   Gastrointestinal:  Positive for abdominal pain and nausea.   Genitourinary:  Positive for flank pain. Negative for dysuria.    Musculoskeletal:  Negative for back pain.   Skin:  Negative for rash.   Neurological:  Negative for weakness and headaches.   Hematological:  Does not bruise/bleed easily.   Psychiatric/Behavioral: Negative.       Physical Exam     Initial Vitals [10/17/22 1857]   BP Pulse Resp Temp SpO2   (!) 178/90 92 14 98.2 °F (36.8 °C) 99 %      MAP       --         Physical Exam    Vitals reviewed.  Constitutional: She appears well-developed and well-nourished.   HENT:   Head: Normocephalic and atraumatic.   Eyes: Conjunctivae and EOM are normal. Pupils are equal, round, and reactive to light.   Neck: Neck supple.   Normal range of motion.  Cardiovascular:  Normal rate, regular rhythm and normal heart sounds.           Pulmonary/Chest: Breath sounds normal. No respiratory distress. She has no wheezes. She exhibits no tenderness.   Abdominal: Abdomen is soft. Bowel sounds are normal. There is abdominal tenderness in the left lower quadrant.   There is left CVA tenderness. There is negative Gallego's sign. negative Rovsing's sign  Musculoskeletal:         General: Normal range of motion.      Cervical back: Normal range of motion and neck supple.     Neurological: She is alert and oriented to person, place, and time. She displays normal reflexes. No cranial nerve deficit or sensory deficit.   Skin: Skin is warm and dry.   Psychiatric: She has a normal mood and affect. Her behavior is normal. Judgment and thought content normal.       ED Course   Procedures  Labs Reviewed   CBC W/ AUTO DIFFERENTIAL    Narrative:     The following orders were created for panel order CBC auto differential.  Procedure                               Abnormality         Status                     ---------                               -----------         ------                     CBC with Differential[[id number]]                                                         Please view results for these tests on the individual orders.   COMPREHENSIVE  METABOLIC PANEL   URINALYSIS, REFLEX TO URINE CULTURE   LACTIC ACID, PLASMA   CBC WITH DIFFERENTIAL          Imaging Results    None          Medications   morphine injection 4 mg (has no administration in time range)   ondansetron injection 4 mg (has no administration in time range)                              Clinical Impression:   Final diagnoses:  [R10.9] Flank pain (Primary)             GERSON Fernandez  10/17/22 0964

## 2022-10-19 VITALS
RESPIRATION RATE: 18 BRPM | HEIGHT: 62 IN | TEMPERATURE: 98 F | OXYGEN SATURATION: 98 % | WEIGHT: 166.88 LBS | DIASTOLIC BLOOD PRESSURE: 86 MMHG | SYSTOLIC BLOOD PRESSURE: 145 MMHG | HEART RATE: 72 BPM | BODY MASS INDEX: 30.71 KG/M2

## 2022-10-19 DIAGNOSIS — N20.0 KIDNEY STONE: Primary | ICD-10-CM

## 2022-10-19 PROBLEM — N23 URETERAL COLIC: Status: ACTIVE | Noted: 2022-10-19

## 2022-10-19 LAB
ALBUMIN SERPL-MCNC: 3.3 GM/DL (ref 3.5–5)
ALBUMIN/GLOB SERPL: 1.3 RATIO (ref 1.1–2)
ALP SERPL-CCNC: 49 UNIT/L (ref 40–150)
ALT SERPL-CCNC: 17 UNIT/L (ref 0–55)
AST SERPL-CCNC: 14 UNIT/L (ref 5–34)
BASOPHILS # BLD AUTO: 0.05 X10(3)/MCL (ref 0–0.2)
BASOPHILS NFR BLD AUTO: 0.9 %
BILIRUBIN DIRECT+TOT PNL SERPL-MCNC: 0.3 MG/DL
BUN SERPL-MCNC: 10.3 MG/DL (ref 9.8–20.1)
CALCIUM SERPL-MCNC: 9 MG/DL (ref 8.4–10.2)
CHLORIDE SERPL-SCNC: 108 MMOL/L (ref 98–107)
CHOLEST SERPL-MCNC: 171 MG/DL
CHOLEST/HDLC SERPL: 4 {RATIO} (ref 0–5)
CO2 SERPL-SCNC: 24 MMOL/L (ref 22–29)
CREAT SERPL-MCNC: 0.61 MG/DL (ref 0.55–1.02)
EOSINOPHIL # BLD AUTO: 0.26 X10(3)/MCL (ref 0–0.9)
EOSINOPHIL NFR BLD AUTO: 4.8 %
ERYTHROCYTE [DISTWIDTH] IN BLOOD BY AUTOMATED COUNT: 12.6 % (ref 11.5–17)
GFR SERPLBLD CREATININE-BSD FMLA CKD-EPI: >60 MLS/MIN/1.73/M2
GLOBULIN SER-MCNC: 2.5 GM/DL (ref 2.4–3.5)
GLUCOSE SERPL-MCNC: 123 MG/DL (ref 74–100)
HCT VFR BLD AUTO: 35.3 % (ref 37–47)
HDLC SERPL-MCNC: 48 MG/DL (ref 35–60)
HGB BLD-MCNC: 11.2 GM/DL (ref 12–16)
IMM GRANULOCYTES # BLD AUTO: 0.01 X10(3)/MCL (ref 0–0.04)
IMM GRANULOCYTES NFR BLD AUTO: 0.2 %
LDLC SERPL CALC-MCNC: 113 MG/DL (ref 50–140)
LYMPHOCYTES # BLD AUTO: 2.26 X10(3)/MCL (ref 0.6–4.6)
LYMPHOCYTES NFR BLD AUTO: 41.9 %
MAGNESIUM SERPL-MCNC: 2.1 MG/DL (ref 1.6–2.6)
MCH RBC QN AUTO: 26.4 PG (ref 27–31)
MCHC RBC AUTO-ENTMCNC: 31.7 MG/DL (ref 33–36)
MCV RBC AUTO: 83.3 FL (ref 80–94)
MONOCYTES # BLD AUTO: 0.41 X10(3)/MCL (ref 0.1–1.3)
MONOCYTES NFR BLD AUTO: 7.6 %
NEUTROPHILS # BLD AUTO: 2.4 X10(3)/MCL (ref 2.1–9.2)
NEUTROPHILS NFR BLD AUTO: 44.6 %
NRBC BLD AUTO-RTO: 0 %
PLATELET # BLD AUTO: 371 X10(3)/MCL (ref 130–400)
PMV BLD AUTO: 9.2 FL (ref 7.4–10.4)
POTASSIUM SERPL-SCNC: 3.6 MMOL/L (ref 3.5–5.1)
PROT SERPL-MCNC: 5.8 GM/DL (ref 6.4–8.3)
RBC # BLD AUTO: 4.24 X10(6)/MCL (ref 4.2–5.4)
SODIUM SERPL-SCNC: 139 MMOL/L (ref 136–145)
TRIGL SERPL-MCNC: 48 MG/DL (ref 37–140)
VLDLC SERPL CALC-MCNC: 10 MG/DL
WBC # SPEC AUTO: 5.4 X10(3)/MCL (ref 4.5–11.5)

## 2022-10-19 PROCEDURE — 63600175 PHARM REV CODE 636 W HCPCS: Performed by: PHYSICIAN ASSISTANT

## 2022-10-19 PROCEDURE — 63600175 PHARM REV CODE 636 W HCPCS: Performed by: NURSE PRACTITIONER

## 2022-10-19 PROCEDURE — 25000003 PHARM REV CODE 250: Performed by: SPECIALIST

## 2022-10-19 PROCEDURE — 96375 TX/PRO/DX INJ NEW DRUG ADDON: CPT

## 2022-10-19 PROCEDURE — 36415 COLL VENOUS BLD VENIPUNCTURE: CPT | Performed by: INTERNAL MEDICINE

## 2022-10-19 PROCEDURE — 96366 THER/PROPH/DIAG IV INF ADDON: CPT

## 2022-10-19 PROCEDURE — 25000003 PHARM REV CODE 250: Performed by: NURSE PRACTITIONER

## 2022-10-19 PROCEDURE — G0378 HOSPITAL OBSERVATION PER HR: HCPCS

## 2022-10-19 PROCEDURE — 25000003 PHARM REV CODE 250: Performed by: PHYSICIAN ASSISTANT

## 2022-10-19 PROCEDURE — 85025 COMPLETE CBC W/AUTO DIFF WBC: CPT | Performed by: INTERNAL MEDICINE

## 2022-10-19 PROCEDURE — 96367 TX/PROPH/DG ADDL SEQ IV INF: CPT

## 2022-10-19 PROCEDURE — 25000003 PHARM REV CODE 250: Performed by: INTERNAL MEDICINE

## 2022-10-19 PROCEDURE — S0030 INJECTION, METRONIDAZOLE: HCPCS | Performed by: PHYSICIAN ASSISTANT

## 2022-10-19 PROCEDURE — 80053 COMPREHEN METABOLIC PANEL: CPT | Performed by: INTERNAL MEDICINE

## 2022-10-19 PROCEDURE — 80061 LIPID PANEL: CPT | Performed by: INTERNAL MEDICINE

## 2022-10-19 PROCEDURE — 83735 ASSAY OF MAGNESIUM: CPT | Performed by: INTERNAL MEDICINE

## 2022-10-19 RX ORDER — ONDANSETRON 4 MG/1
4 TABLET, ORALLY DISINTEGRATING ORAL EVERY 6 HOURS PRN
Qty: 14 TABLET | Refills: 0 | Status: SHIPPED | OUTPATIENT
Start: 2022-10-19 | End: 2022-11-16

## 2022-10-19 RX ORDER — HYOSCYAMINE SULFATE 0.12 MG/1
0.12 TABLET SUBLINGUAL EVERY 4 HOURS PRN
Qty: 28 TABLET | Refills: 0 | Status: SHIPPED | OUTPATIENT
Start: 2022-10-19 | End: 2022-10-26

## 2022-10-19 RX ORDER — HYDROCODONE BITARTRATE AND ACETAMINOPHEN 7.5; 325 MG/1; MG/1
1 TABLET ORAL EVERY 6 HOURS PRN
Qty: 28 TABLET | Refills: 0 | Status: SHIPPED | OUTPATIENT
Start: 2022-10-19 | End: 2022-10-26

## 2022-10-19 RX ORDER — PHENAZOPYRIDINE HYDROCHLORIDE 100 MG/1
100 TABLET, FILM COATED ORAL
Qty: 30 TABLET | Refills: 0 | Status: SHIPPED | OUTPATIENT
Start: 2022-10-19 | End: 2022-10-29

## 2022-10-19 RX ORDER — TAMSULOSIN HYDROCHLORIDE 0.4 MG/1
0.4 CAPSULE ORAL NIGHTLY
Qty: 30 CAPSULE | Refills: 11 | Status: SHIPPED | OUTPATIENT
Start: 2022-10-19 | End: 2022-12-12

## 2022-10-19 RX ADMIN — HYDROCODONE BITARTRATE AND ACETAMINOPHEN 1 TABLET: 7.5; 325 TABLET ORAL at 03:10

## 2022-10-19 RX ADMIN — PHENAZOPYRIDINE HYDROCHLORIDE 100 MG: 100 TABLET ORAL at 11:10

## 2022-10-19 RX ADMIN — METRONIDAZOLE 500 MG: 5 INJECTION, SOLUTION INTRAVENOUS at 12:10

## 2022-10-19 RX ADMIN — METRONIDAZOLE 500 MG: 5 INJECTION, SOLUTION INTRAVENOUS at 08:10

## 2022-10-19 RX ADMIN — CEFTRIAXONE SODIUM 1 G: 1 INJECTION, POWDER, FOR SOLUTION INTRAMUSCULAR; INTRAVENOUS at 09:10

## 2022-10-19 RX ADMIN — ONDANSETRON 4 MG: 2 INJECTION INTRAMUSCULAR; INTRAVENOUS at 08:10

## 2022-10-19 RX ADMIN — PHENAZOPYRIDINE HYDROCHLORIDE 100 MG: 100 TABLET ORAL at 08:10

## 2022-10-19 RX ADMIN — AMLODIPINE BESYLATE 5 MG: 5 TABLET ORAL at 08:10

## 2022-10-19 NOTE — PLAN OF CARE
KIYA contacted Urology at [facility] ([phone]) and spoke with Dai. Dai stated she received a referral and they will contact pt with an apt time.

## 2022-10-19 NOTE — PLAN OF CARE
10/19/22 0955   Discharge Assessment   Assessment Type Discharge Planning Assessment   Confirmed/corrected address, phone number and insurance Yes   Confirmed Demographics Correct on Facesheet   Source of Information patient   When was your last doctors appointment?   (Patient does not have a PCP but reports her mother knows someone etc.)   Communicated WINNIE with patient/caregiver Yes   Reason For Admission UTI   Lives With parent(s);spouse   Do you expect to return to your current living situation? Yes   Do you have help at home or someone to help you manage your care at home? Yes   Who are your caregiver(s) and their phone number(s)? Spouse: Cesar Foote: [phone]   Prior to hospitilization cognitive status: Unable to Assess   Current cognitive status: Alert/Oriented   Walking or Climbing Stairs Difficulty none   Dressing/Bathing Difficulty none   Home Accessibility wheelchair accessible   Home Layout Able to live on 1st floor   Equipment Currently Used at Home none   Readmission within 30 days? Yes   Patient currently being followed by outpatient case management? No   Do you currently have service(s) that help you manage your care at home? No   Do you take prescription medications? No   Do you have prescription coverage? No   Do you have any problems affording any of your prescribed medications? TBD   Who is going to help you get home at discharge? Spouse, Cesar Foote.   How do you get to doctors appointments? car, drives self   Are you on dialysis? No   Do you take coumadin? No   Discharge Plan A Home with family   Discharge Plan B Home   DME Needed Upon Discharge  none   Discharge Plan discussed with: Patient   Discharge Barriers Identified None   Relationship/Environment   Name(s) of Who Lives With Patient Patient reports her parents are currently staying with her and her spouse at this time.     Patient with no PCP but reports she will get setup with someone.  Patient is self-pay. Sw asked if she completed  a Medicaid Application with a financial counselor. She said yes.

## 2022-10-19 NOTE — DISCHARGE SUMMARY
[facility] Hospital Medicine Discharge Summary    Admit Date: 10/18/2022  Discharge Date and Time: 10/19/74226:54 PM  Admitting Physician: Hospitalist team   Discharging Physician: Amadeo Gore MD.  Primary Care Physician: Primary Doctor No      Discharge Diagnoses:  Ureteral colic  9 mm left upper pole calyx and 4 mm stone in proximal left ureter  Recently diagnosed Diverticulitis, improving  Essential hypertension  History of hyperlipidemia, chronic back pain    Hospital Course:   56 y.o. female with a past medical history of essential hypertension, hyperlipidemia, diverticulosis, kidney stones, and chronic back pain presented to [facility] on 10/18/2022 transferred from [facility] for urology services. Patient presents to [facility] on 10/17 with complaint of worsening left flank and left lower quadrant abdominal pain.  Patient reports she had recent admission on 10/14 for diverticulitis and kidney stone.  During admission patient had left stent placement by Urology.  Patient reports she was discharged on 10/15 with Cipro and Flagyl.  Patient reports continued left flank pain, left lower abdominal pain, hematuria and nausea, prompting her to [facility] yesterday.  On exam patient complains left flank and lower pain.  Patient, vomiting, fever, chills, dysuria, chest pain, and shortness of breath.     Labs at outside facility revealed WBC 8.6, BUN 15.8,  creatinine 0.7, and lactic acid 0.8.  UA revealed 3+ occult blood, 1+ nitrite,  250 leukocytes, and trace bacteria.  Patient was given dose of IV Cipro and Flagyl.  CT abdomen and pelvis showed both ureteral stents with interval resolution of hydronephrosis; There is interval cranial migration of the previously present left ureteropelvic junction stone, now located in the upper pole calyx and measures 9 mm in diameter, a 4 mm stone is seen in the proximal left ureter.  Diverticuli improved compared to previous CT scan.  Patient was transferred to [facility] for  "urology services. Urology evluated and recommending outpatient lithotrispy through [facility] system.  A referral has been sent to the [facility] clinic.  Her pain medicine was adjusted and feeling better    Vitals:  Blood pressure (!) 145/86, pulse 72, temperature 98 °F (36.7 °C), temperature source Oral, resp. rate 18, height 5' 2" (1.575 m), weight 75.7 kg (166 lb 14.4 oz), SpO2 98 %..    Physical Exam:  Awake, Alert, Oriented x 3, No new focal Neurologic deficit, Normal Affect  NC/AT, PERRLA, EOMI  Supple neck, no JVD, No cervical lymphadenopathy  Symmetrical chest, Good air entry bilaterally. No rhonchi, wheezes, crackles appreciated  RRR, No gallop, rub or murmur  +ve Bowel sounds x4, Abd soft and non tender, no rebound, guarding or rigidity  No Cyanosis, cludding or edema, No new rash or bruises    Procedures Performed: No admission procedures for hospital encounter.     Significant Diagnostic Studies: See Full reports for all details  Admission on 10/18/2022   Component Date Value    Sodium Level 10/18/2022 137     Potassium Level 10/18/2022 4.0     Chloride 10/18/2022 104     Carbon Dioxide 10/18/2022 23     Glucose Level 10/18/2022 154 (H)     Blood Urea Nitrogen 10/18/2022 10.6     Creatinine 10/18/2022 0.67     Calcium Level Total 10/18/2022 9.4     Protein Total 10/18/2022 7.0     Albumin Level 10/18/2022 3.9     Globulin 10/18/2022 3.1     Albumin/Globulin Ratio 10/18/2022 1.3     Bilirubin Total 10/18/2022 0.3     Alkaline Phosphatase 10/18/2022 58     Alanine Aminotransferase 10/18/2022 26     Aspartate Aminotransfera* 10/18/2022 23     eGFR 10/18/2022 >60     Magnesium Level 10/18/2022 2.00     Phosphorus Level 10/18/2022 3.6     CULTURE, BLOOD ([facility]) 10/18/2022 No Growth At 24 Hours     CULTURE, BLOOD ([facility]) 10/18/2022 No Growth At 24 Hours     WBC 10/18/2022 7.7     RBC 10/18/2022 4.78     Hgb 10/18/2022 12.6     Hct 10/18/2022 40.3     MCV 10/18/2022 84.3     MCH 10/18/2022 26.4 (L)     MCHC 10/18/2022 31.3 (L)  "    RDW 10/18/2022 12.6     Platelet 10/18/2022 385     MPV 10/18/2022 9.3     Neut % 10/18/2022 73.7     Lymph % 10/18/2022 18.2     Mono % 10/18/2022 4.5     Eos % 10/18/2022 2.6     Basophil % 10/18/2022 0.6     Lymph # 10/18/2022 1.41     Neut # 10/18/2022 5.7     Mono # 10/18/2022 0.35     Eos # 10/18/2022 0.20     Baso # 10/18/2022 0.05     IG# 10/18/2022 0.03     IG% 10/18/2022 0.4     NRBC% 10/18/2022 0.0     Sodium Level 10/19/2022 139     Potassium Level 10/19/2022 3.6     Chloride 10/19/2022 108 (H)     Carbon Dioxide 10/19/2022 24     Glucose Level 10/19/2022 123 (H)     Blood Urea Nitrogen 10/19/2022 10.3     Creatinine 10/19/2022 0.61     Calcium Level Total 10/19/2022 9.0     Protein Total 10/19/2022 5.8 (L)     Albumin Level 10/19/2022 3.3 (L)     Globulin 10/19/2022 2.5     Albumin/Globulin Ratio 10/19/2022 1.3     Bilirubin Total 10/19/2022 0.3     Alkaline Phosphatase 10/19/2022 49     Alanine Aminotransferase 10/19/2022 17     Aspartate Aminotransfera* 10/19/2022 14     eGFR 10/19/2022 >60     WBC 10/19/2022 5.4     RBC 10/19/2022 4.24     Hgb 10/19/2022 11.2 (L)     Hct 10/19/2022 35.3 (L)     MCV 10/19/2022 83.3     MCH 10/19/2022 26.4 (L)     MCHC 10/19/2022 31.7 (L)     RDW 10/19/2022 12.6     Platelet 10/19/2022 371     MPV 10/19/2022 9.2     Neut % 10/19/2022 44.6     Lymph % 10/19/2022 41.9     Mono % 10/19/2022 7.6     Eos % 10/19/2022 4.8     Basophil % 10/19/2022 0.9     Lymph # 10/19/2022 2.26     Neut # 10/19/2022 2.4     Mono # 10/19/2022 0.41     Eos # 10/19/2022 0.26     Baso # 10/19/2022 0.05     IG# 10/19/2022 0.01     IG% 10/19/2022 0.2     NRBC% 10/19/2022 0.0     Magnesium Level 10/19/2022 2.10     Cholesterol Total 10/19/2022 171     HDL Cholesterol 10/19/2022 48     Triglyceride 10/19/2022 48     Cholesterol/HDL Ratio 10/19/2022 4     Very Low Density Lipopro* 10/19/2022 10     LDL Cholesterol 10/19/2022 113.00         Microbiology Results (last 7 days)       Procedure  Component Value Units Date/Time    Blood Culture [[id number]]  (Normal) Collected: 10/18/22 0656    Order Status: Completed Specimen: Blood Updated: 10/19/22 1100     CULTURE, BLOOD ([facility]) No Growth At 24 Hours    Blood Culture [[id number]]  (Normal) Collected: 10/18/22 0656    Order Status: Completed Specimen: Blood Updated: 10/19/22 1100     CULTURE, BLOOD ([facility]) No Growth At 24 Hours             X-Ray Abdomen AP 1 View    Result Date: 10/18/2022  EXAMINATION: XR ABDOMEN AP 1 VIEW CLINICAL HISTORY: Kidney Stone; TECHNIQUE: AP View(s) of the abdomen was performed. COMPARISON: CT abdomen pelvis 10/17/2022 FINDINGS: There are 2 left ureteral stents at the duplex left renal collecting system.  A 1 cm calcification projects adjacent to the upper pole moiety proximal coil at the renal pelvis.  A 4 mm calcification projects over the lower pole moiety in the expected region of a calyx.  A 9 mm calcification projects over the mid ureteral stents at approximately the level of the L4 vertebral body.  This is similar to prior plain radiograph. On the right there are small 3-4 mm calculi at the mid to lower pole. There are postsurgical changes at the lower lumbar spine. Phleboliths in the pelvis.     1. Left ureteral stents in place with known duplex left renal collecting system.  Grossly unchanged configuration of left nephroureterolithiasis. 2. Right nephrolithiasis Electronically signed by: Clarissa Peguero Date:    10/18/2022 Time:    13:24    CT Abdomen Pelvis With Contrast    Result Date: 10/18/2022  EXAMINATION: CT ABDOMEN PELVIS WITH CONTRAST CLINICAL HISTORY: left flank and llq pain, recent admit for diverticulitis kidney stone and subsequent stent placement; TECHNIQUE: CT imaging of the abdomen and pelvis after the administration of 100 mL of Isovue 370 intravenous contrast.  Oral contrast not administered. Dose length product 690 mGycm. Automatic exposure control, adjustment of mA/kV or iterative reconstruction  technique used to limit radiation dose. COMPARISON: CT 10/14/2022 FINDINGS: Liver/biliary: Mild hepatic steatosis with small areas of sparing near the gallbladder fossa.  No radiodense gallstones. No intra or extrahepatic biliary ductal dilation. Pancreas: Normal. Spleen: Normal. Adrenals: Normal. Genitourinary: Interval placement of 2 left ureteral stents.  No significant collecting system dilatation.  7 mm stone in the proximal left ureter.  Bladder under distended with no definitive abnormality. Stomach/bowel: Normal caliber small bowel and colon.  Normal appendix. Inflammatory changes adjacent to the descending colon and a short segment of sigmoid colon have moderately improved during the interval. Lymph nodes/peritoneum: No pathologically enlarged lymph node identified. No pneumoperitoneum or significant ascites.  No organized fluid collection. Vasculature: Normal abdominal aortic caliber. Abdominal wall: Very mild postsurgical changes in the lower anterior abdominal wall. Lung bases: No consolidation or pleural effusion. Musculoskeletal: No acute osseous findings.     1. Placement of 2 left ureteral stents since 10/14/2022 with no significant residual collecting system dilatation.  7 mm transverse diameter stone in the proximal left ureter adjacent to the upper moiety stent. 2. Areas of suspected diverticulitis involving the descending and sigmoid colon show moderate improvement since prior CT. No significant discrepancy between my interpretation and the preliminary radiology report. Electronically signed by: Russ Otto Date:    10/18/2022 Time:    08:25    CT Abdomen Pelvis With Contrast    Result Date: 10/14/2022  EXAMINATION: CT ABDOMEN PELVIS WITH CONTRAST CLINICAL HISTORY: LLQ abdominal pain; TECHNIQUE: CT imaging of the abdomen and pelvis after the administration of 100 mL of Isovue 370 intravenous contrast. Dose length product 812 mGycm. Automatic exposure control, adjustment of mA/kV or iterative  reconstruction technique used to limit radiation dose. COMPARISON: No relevant comparison studies available at the time of dictation. FINDINGS: Liver/biliary: Mild generalized hepatic steatosis.  No radiodense gallstones. No intra or extrahepatic biliary ductal dilation. Pancreas: Normal. Spleen: Normal. Adrenals: Normal. 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  A stone in the proximal to midportion of the left ureter measures 12 mm in length and 8 mm in transverse diameter.  There is a 9 mm stone in the left upper pole renal pelvis.  These result in mild left hydronephrosis.  Few other nonobstructing renal stones bilaterally.  Bladder decompressed and not well evaluated.  Uterus not seen.  No adnexal mass. Stomach/bowel: No evidence of bowel obstruction. Normal appendix. Colonic diverticulosis with mild wall thickening and pericolonic stranding along a short segment descending colon.  There is an additional small site of less extensive inflammation involving a diverticulum along the sigmoid colon (series 4, image 71). Lymph nodes/peritoneum: No pathologically enlarged lymph node identified. Trace ascites near the inflamed segment of descending colon.  No pneumoperitoneum or drainable fluid collection. Vasculature: Normal abdominal aortic caliber. Abdominal wall: Minimal postsurgical change in the lower anterior abdominal wall. Lung bases: No consolidation or pleural effusion. Musculoskeletal: No acute osseous findings. Interbody implants at L4-5 and L5-S1.     1. Two suspected areas of uncomplicated colonic diverticulitis. 2. Two left renal pelvis/ureteral stones with mild left hydronephrosis. No significant discrepancy between my interpretation and the preliminary radiology report. Electronically signed by: Russ Otto Date:    10/14/2022 Time:    08:52    SURG FL Surgery Fluoro Usage    Result Date: 10/14/2022  See OP Notes for results. IMPRESSION: See OP Notes for  results. This procedure was auto-finalized by: Virtual Radiologist  - pulls last radiology orders        Medication List        START taking these medications      HYDROcodone-acetaminophen 7.5-325 mg per tablet  Commonly known as: NORCO  Take 1 tablet by mouth every 6 (six) hours as needed for Pain.     hyoscyamine 0.125 mg Subl  Place 1 tablet (0.125 mg total) under the tongue every 4 (four) hours as needed.     ondansetron 4 MG Tbdl  Commonly known as: ZOFRAN-ODT  Take 1 tablet (4 mg total) by mouth every 6 (six) hours as needed.     phenazopyridine 100 MG tablet  Commonly known as: PYRIDIUM  Take 1 tablet (100 mg total) by mouth 3 (three) times daily with meals. for 10 days     tamsulosin 0.4 mg Cap  Commonly known as: FLOMAX  Take 1 capsule (0.4 mg total) by mouth every evening.            CONTINUE taking these medications      ALPRAZolam 0.5 MG tablet  Commonly known as: XANAX  Take 1 tablet (0.5 mg total) by mouth 3 (three) times daily as needed for Anxiety.     ciprofloxacin HCl 500 MG tablet  Commonly known as: CIPRO  Take 1 tablet (500 mg total) by mouth every 8 (eight) hours. for 9 days     ketorolac 10 mg tablet  Commonly known as: TORADOL  Take 1 tablet (10 mg total) by mouth every 6 (six) hours. for 5 days     metroNIDAZOLE 500 MG tablet  Commonly known as: FLAGYL  Take 1 tablet (500 mg total) by mouth every 8 (eight) hours. for 9 days               Where to Get Your Medications        These medications were sent to [address] [address]      Phone: [phone]   HYDROcodone-acetaminophen 7.5-325 mg per tablet  hyoscyamine 0.125 mg Subl  ondansetron 4 MG Tbdl  phenazopyridine 100 MG tablet  tamsulosin 0.4 mg Cap          Explained in detail to the patient about the discharge plan, medications, and follow-up visits. Pt understands and agrees with the treatment plan  Discharged Condition: stable  Diet:  regular  Disposition: home    Medications Per DC med rec  Activities as tolerated  Follow up with your PCP in 2 wks   For further questions contact hospitalist office    Discharge time 33 minutes    For worsening symptoms, chest pain, shortness of breath, increased abdominal pain, high grade fever, stroke or stroke like symptoms, immediately go to the nearest Emergency Room or call 911 as soon as possible.      Amadeo Camacho M.D, on 10/19/2022. at 1:54 PM.           91.9

## 2022-10-19 NOTE — PROGRESS NOTES
[facility]  Hospital Medicine Progress Note        Chief Complaint: Inpatient Follow-up for abodminal pain    HPI:   56 y.o. female with a past medical history of essential hypertension, hyperlipidemia, diverticulosis, kidney stones, and chronic back pain presented to [facility] on 10/18/2022 transferred from [facility] for urology services. Patient presents to [facility] on 10/17 with complaint of worsening left flank and left lower quadrant abdominal pain.  Patient reports she had recent admission on 10/14 for diverticulitis and kidney stone.  During admission patient had left stent placement by Urology.  Patient reports she was discharged on 10/15 with Cipro and Flagyl.  Patient reports continued left flank pain, left lower abdominal pain, hematuria and nausea, prompting her to [facility] yesterday.  On exam patient complains left flank and lower pain.  Patient, vomiting, fever, chills, dysuria, chest pain, and shortness of breath.     Labs at outside facility revealed WBC 8.6, BUN 15.8,  creatinine 0.7, and lactic acid 0.8.  UA revealed 3+ occult blood, 1+ nitrite,  250 leukocytes, and trace bacteria.  Patient was given dose of IV Cipro and Flagyl.  CT abdomen and pelvis showed both ureteral stents with interval resolution of hydronephrosis; There is interval cranial migration of the previously present left ureteropelvic junction stone, now located in the upper pole calyx and measures 9 mm in diameter, a 4 mm stone is seen in the proximal left ureter.  Diverticuli improved compared to previous CT scan.  Patient was transferred to [facility] for urology services. Urology evluated and recommending outpatient lithotrispy through [facility] system.  A referral has been sent to the [facility] clinic.  Her pain medicine was adjusted and feeling better    Interval Hx:  Patient states she is feeling better this morning.  Pain better controlled.  Tolerating PO but still with some mild nausea.  Ambulating. +BM. Good  UOP    Objective/physical exam:  General: In no acute distress, afebrile  Chest: Clear to auscultation bilaterally  Heart: RRR, +S1, S2, no appreciable murmur  Abdomen: Soft, nontender, BS +  MSK: Warm, no lower extremity edema, no clubbing or cyanosis  Neurologic: Alert and oriented x4, Cranial nerve II-XII intact, Strength 5/5 in all 4 extremities    VITAL SIGNS: 24 HRS MIN & MAX LAST   Temp  Min: 97.8 °F (36.6 °C)  Max: 98.8 °F (37.1 °C) 97.9 °F (36.6 °C)   BP  Min: 120/80  Max: 178/76 120/80   Pulse  Min: 67  Max: 84  68   Resp  Min: 12  Max: 20 12   SpO2  Min: 95 %  Max: 98 % 98 %       Recent Labs   Lab 10/17/22  2118 10/18/22  0656 10/19/22  0437   WBC 8.6 7.7 5.4   RBC 5.01 4.78 4.24   HGB 13.1 12.6 11.2*   HCT 43.1 40.3 35.3*   MCV 86.0 84.3 83.3   MCH 26.1* 26.4* 26.4*   MCHC 30.4* 31.3* 31.7*   RDW 12.3 12.6 12.6   * 385 371   MPV 9.4 9.3 9.2       Recent Labs   Lab 10/17/22  2118 10/18/22  0656 10/19/22  0437    137 139   K 4.4 4.0 3.6   CO2 26 23 24   BUN 15.8 10.6 10.3   CREATININE 0.78 0.67 0.61   CALCIUM 10.1 9.4 9.0   MG  --  2.00 2.10   ALBUMIN 4.1 3.9 3.3*   ALKPHOS 63 58 49   ALT 25 26 17   AST 26 23 14   BILITOT 0.2 0.3 0.3          Microbiology Results (last 7 days)       Procedure Component Value Units Date/Time    Blood Culture [[id number]]  (Normal) Collected: 10/18/22 0656    Order Status: Completed Specimen: Blood Updated: 10/19/22 1100     CULTURE, BLOOD ([facility]) No Growth At 24 Hours    Blood Culture [[id number]]  (Normal) Collected: 10/18/22 0656    Order Status: Completed Specimen: Blood Updated: 10/19/22 1100     CULTURE, BLOOD ([facility]) No Growth At 24 Hours             See below for Radiology    Scheduled Med:   amLODIPine  5 mg Oral Daily    cefTRIAXone (ROCEPHIN) IVPB  1 g Intravenous Q24H    metronidazole  500 mg Intravenous Q8H    phenazopyridine  100 mg Oral TID WM    tamsulosin  0.4 mg Oral QHS        Continuous Infusions:   lactated ringers 125 mL/hr at 10/18/22 0611         PRN Meds:  acetaminophen, acetaminophen, ALPRAZolam, aluminum-magnesium hydroxide-simethicone, bisacodyL, butalbital-acetaminophen-caffeine -40 mg, hydrALAZINE, HYDROcodone-acetaminophen, HYDROmorphone, hyoscyamine, labetaloL, morphine, ondansetron, polyethylene glycol, prochlorperazine, senna-docusate 8.6-50 mg, sodium chloride 0.9%, trazodone       Assessment/Plan:   Uretric colic  9 mm left upper pole calyx and 4 mm stone in proximal left ureter  Recently diagnosed Diverticulitis, improving  Essential hypertension  History of hyperlipidemia, chronic back pain     Appreciate urology recommendations  Will eventually need outpatient lithotripsy and referral has been sent to [facility] urology clinic  They recommend more aggressive pain control with hydrocodone, levsin, pyridium, and zofran.   Otherwise continue home meds.   If pain better controlled can likely go home later today    Patient condition:  Stable    Anticipated discharge and Disposition: TBD      All diagnosis and differential diagnosis have been reviewed; assessment and plan has been documented; I have personally reviewed the labs and test results that are presently available; I have reviewed the patients medication list; I have reviewed the consulting providers response and recommendations. I have reviewed or attempted to review medical records based upon their availability    All of the patient's questions have been  addressed and answered. Patient's is agreeable to the above stated plan. I will continue to monitor closely and make adjustments to medical management as needed.  _____________________________________________________________________      Radiology:  X-Ray Abdomen AP 1 View  Narrative: EXAMINATION:  XR ABDOMEN AP 1 VIEW    CLINICAL HISTORY:  Kidney Stone;    TECHNIQUE:  AP View(s) of the abdomen was performed.    COMPARISON:  CT abdomen pelvis 10/17/2022    FINDINGS:  There are 2 left ureteral stents at the duplex left renal collecting  system.  A 1 cm calcification projects adjacent to the upper pole moiety proximal coil at the renal pelvis.  A 4 mm calcification projects over the lower pole moiety in the expected region of a calyx.  A 9 mm calcification projects over the mid ureteral stents at approximately the level of the L4 vertebral body.  This is similar to prior plain radiograph.    On the right there are small 3-4 mm calculi at the mid to lower pole.    There are postsurgical changes at the lower lumbar spine.    Phleboliths in the pelvis.  Impression: 1. Left ureteral stents in place with known duplex left renal collecting system.  Grossly unchanged configuration of left nephroureterolithiasis.  2. Right nephrolithiasis    Electronically signed by: Clarissa Peguero  Date:    10/18/2022  Time:    13:24  CT Abdomen Pelvis With Contrast  Narrative: EXAMINATION:  CT ABDOMEN PELVIS WITH CONTRAST    CLINICAL HISTORY:  left flank and llq pain, recent admit for diverticulitis kidney stone and subsequent stent placement;    TECHNIQUE:  CT imaging of the abdomen and pelvis after the administration of 100 mL of Isovue 370 intravenous contrast.  Oral contrast not administered. Dose length product 690 mGycm. Automatic exposure control, adjustment of mA/kV or iterative reconstruction technique used to limit radiation dose.    COMPARISON:  CT 10/14/2022    FINDINGS:  Liver/biliary: Mild hepatic steatosis with small areas of sparing near the gallbladder fossa.  No radiodense gallstones. No intra or extrahepatic biliary ductal dilation.    Pancreas: Normal.    Spleen: Normal.    Adrenals: Normal.    Genitourinary: Interval placement of 2 left ureteral stents.  No significant collecting system dilatation.  7 mm stone in the proximal left ureter.  Bladder under distended with no definitive abnormality.    Stomach/bowel: Normal caliber small bowel and colon.  Normal appendix. Inflammatory changes adjacent to the descending colon and a short segment of  sigmoid colon have moderately improved during the interval.    Lymph nodes/peritoneum: No pathologically enlarged lymph node identified. No pneumoperitoneum or significant ascites.  No organized fluid collection.    Vasculature: Normal abdominal aortic caliber.    Abdominal wall: Very mild postsurgical changes in the lower anterior abdominal wall.    Lung bases: No consolidation or pleural effusion.    Musculoskeletal: No acute osseous findings.  Impression: 1. Placement of 2 left ureteral stents since 10/14/2022 with no significant residual collecting system dilatation.  7 mm transverse diameter stone in the proximal left ureter adjacent to the upper moiety stent.  2. Areas of suspected diverticulitis involving the descending and sigmoid colon show moderate improvement since prior CT.  No significant discrepancy between my interpretation and the preliminary radiology report.    Electronically signed by: Russ Otto  Date:    10/18/2022  Time:    08:25      Amadeo Gore MD   10/19/2022

## 2022-10-23 LAB
BACTERIA BLD CULT: NORMAL
BACTERIA BLD CULT: NORMAL

## 2022-11-04 ENCOUNTER — OFFICE VISIT (OUTPATIENT)
Dept: UROLOGY | Facility: CLINIC | Age: 57
End: 2022-11-04
Payer: MEDICAID

## 2022-11-04 ENCOUNTER — HOSPITAL ENCOUNTER (OUTPATIENT)
Dept: CARDIOLOGY | Facility: HOSPITAL | Age: 57
Discharge: HOME | End: 2022-11-04
Attending: UROLOGY
Payer: MEDICAID

## 2022-11-04 VITALS
SYSTOLIC BLOOD PRESSURE: 155 MMHG | HEART RATE: 71 BPM | BODY MASS INDEX: 31.24 KG/M2 | HEIGHT: 62 IN | DIASTOLIC BLOOD PRESSURE: 90 MMHG | RESPIRATION RATE: 18 BRPM | OXYGEN SATURATION: 98 % | WEIGHT: 169.75 LBS

## 2022-11-04 DIAGNOSIS — Z41.9 SURGERY, ELECTIVE: Primary | ICD-10-CM

## 2022-11-04 DIAGNOSIS — Z41.9 SURGERY, ELECTIVE: ICD-10-CM

## 2022-11-04 DIAGNOSIS — N13.2 HYDRONEPHROSIS WITH RENAL AND URETERAL CALCULUS OBSTRUCTION: ICD-10-CM

## 2022-11-04 PROCEDURE — 3008F PR BODY MASS INDEX (BMI) DOCUMENTED: ICD-10-PCS | Mod: CPTII,,, | Performed by: UROLOGY

## 2022-11-04 PROCEDURE — 3080F PR MOST RECENT DIASTOLIC BLOOD PRESSURE >= 90 MM HG: ICD-10-PCS | Mod: CPTII,,, | Performed by: UROLOGY

## 2022-11-04 PROCEDURE — 1160F PR REVIEW ALL MEDS BY PRESCRIBER/CLIN PHARMACIST DOCUMENTED: ICD-10-PCS | Mod: CPTII,,, | Performed by: UROLOGY

## 2022-11-04 PROCEDURE — 99214 OFFICE O/P EST MOD 30 MIN: CPT | Mod: PBBFAC | Performed by: UROLOGY

## 2022-11-04 PROCEDURE — 99204 PR OFFICE/OUTPT VISIT, NEW, LEVL IV, 45-59 MIN: ICD-10-PCS | Mod: S$PBB,,, | Performed by: UROLOGY

## 2022-11-04 PROCEDURE — 1159F MED LIST DOCD IN RCRD: CPT | Mod: CPTII,,, | Performed by: UROLOGY

## 2022-11-04 PROCEDURE — 3077F SYST BP >= 140 MM HG: CPT | Mod: CPTII,,, | Performed by: UROLOGY

## 2022-11-04 PROCEDURE — 1159F PR MEDICATION LIST DOCUMENTED IN MEDICAL RECORD: ICD-10-PCS | Mod: CPTII,,, | Performed by: UROLOGY

## 2022-11-04 PROCEDURE — 93005 ELECTROCARDIOGRAM TRACING: CPT

## 2022-11-04 PROCEDURE — 3077F PR MOST RECENT SYSTOLIC BLOOD PRESSURE >= 140 MM HG: ICD-10-PCS | Mod: CPTII,,, | Performed by: UROLOGY

## 2022-11-04 PROCEDURE — 1160F RVW MEDS BY RX/DR IN RCRD: CPT | Mod: CPTII,,, | Performed by: UROLOGY

## 2022-11-04 PROCEDURE — 3008F BODY MASS INDEX DOCD: CPT | Mod: CPTII,,, | Performed by: UROLOGY

## 2022-11-04 PROCEDURE — 3080F DIAST BP >= 90 MM HG: CPT | Mod: CPTII,,, | Performed by: UROLOGY

## 2022-11-04 PROCEDURE — 99204 OFFICE O/P NEW MOD 45 MIN: CPT | Mod: S$PBB,,, | Performed by: UROLOGY

## 2022-11-04 RX ORDER — ALPRAZOLAM 0.25 MG/1
0.25 TABLET ORAL 3 TIMES DAILY PRN
COMMUNITY
End: 2023-04-28 | Stop reason: ALTCHOICE

## 2022-11-04 RX ORDER — METHENAMINE, SODIUM PHOSPHATE, MONOBASIC, MONOHYDRATE, PHENYL SALICYLATE, METHYLENE BLUE, AND HYOSCYAMINE SULFATE 118; 40.8; 36; 10; .12 MG/1; MG/1; MG/1; MG/1; MG/1
1 CAPSULE ORAL 4 TIMES DAILY
Qty: 30 CAPSULE | Refills: 1 | Status: SHIPPED | OUTPATIENT
Start: 2022-11-04 | End: 2022-12-12

## 2022-11-04 RX ORDER — HYOSCYAMINE SULFATE 0.125 MG
0.12 TABLET ORAL EVERY 4 HOURS PRN
COMMUNITY
End: 2023-04-28 | Stop reason: ALTCHOICE

## 2022-11-04 NOTE — PROGRESS NOTES
Pt seen by Dr. Holliday. New medication of Uribel will be called into pt pharmacy. RTC one week after surgery. Pt education given both written and verbal.

## 2022-11-04 NOTE — PROGRESS NOTES
CC:  Urolithiais    HPI:  Michelle Foote is a 56 y.o. female seen in consultation for urolithiasis.  She began with left flank pain and presented to the emergency room.  Dr. Nova did a retrograde pyelogram and found that she had a Godinez duplicated left renal collecting system.  She had a calculus at each of the UPJs.  He placed two ureteral stents on 14 October 2022.  She was then seen in [place] yesterday and they are planning to do ureteroscopy on 9 November.  A KUB in [place] yesterday showed a one cm calculus overlying the stent in the upper pole moiety and an eight mm calculus along the stent in the proximal ureter of the lower pole moiety.    Lab Results:    Recent Labs     10/19/22  0437   CREATININE 0.61        Imaging:  See HPI.        Data Review:  Note from [place] from yesterday including KUB. Records from Dr. Nova including operative note and KUB.     ROS:  All systems reviewed and are negative except as documented in HPI and/or Assessment and Plan.     Patient Active Problem List:     Patient Active Problem List   Diagnosis    Kidney stone    Ureteral colic        Past Medical History:  Past Medical History:   Diagnosis Date    Diverticular disease of large intestine without perforation or abscess     Kidney stone         Past Surgical History:  Past Surgical History:   Procedure Laterality Date    CYSTOSCOPY W/ URETERAL STENT PLACEMENT N/A 10/14/2022    Procedure: CYSTOSCOPY, WITH URETERAL STENT INSERTION;  Surgeon: Anderson Nova MD;  Location: [facility];  Service: Urology;  Laterality: N/A;        Family History:  History reviewed. No pertinent family history.     Social History:  Social History     Socioeconomic History    Marital status:    Tobacco Use    Smoking status: Never    Smokeless tobacco: Never   Substance and Sexual Activity    Alcohol use: Yes     Alcohol/week: 2.0 standard drinks     Types: 1 Glasses of wine, 1 Cans of beer per week     Comment: social drink     Drug use: Never   Social History Narrative    ** Merged History Encounter **             Allergies:  Review of patient's allergies indicates:   Allergen Reactions    Rifampin Hives    Latex, natural rubber Rash    Rifamycin analogues Rash        Objective:  Vitals:    11/04/22 0803   BP: (!) 155/90   Pulse: 71   Resp: 18     General:  Well developed, well nourished adult male in no acute distress  Abdomen: Soft, nontender, no masses  Extremities:  No clubbing, cyanosis, or edema  Neurologic:  Grossly intact  Musculoskeletal:  Normal tone      Assessment:  1. Hydronephrosis with renal and ureteral calculus obstruction  - Ambulatory referral/consult to Urology  - methen-m.blue-s.phos-phsal-hyo (URIBEL) 118-10-40.8-36 mg Cap; Take 1 capsule by mouth 4 (four) times daily.  Dispense: 30 capsule; Refill: 1     Plan:  The patient is going to proceed with the ureteroscopy in [place] next week.  She requested something for bladder spasms and was given Uribel.  She will return here possibly for the stent removal she does not wish to drive to [place]    Follow-up:    As above.

## 2022-11-17 ENCOUNTER — HOSPITAL ENCOUNTER (OUTPATIENT)
Dept: RADIOLOGY | Facility: HOSPITAL | Age: 57
Discharge: HOME | End: 2022-11-17
Attending: UROLOGY
Payer: MEDICAID

## 2022-11-17 ENCOUNTER — OFFICE VISIT (OUTPATIENT)
Dept: UROLOGY | Facility: CLINIC | Age: 57
End: 2022-11-17
Payer: MEDICAID

## 2022-11-17 VITALS
RESPIRATION RATE: 20 BRPM | HEART RATE: 73 BPM | WEIGHT: 165 LBS | DIASTOLIC BLOOD PRESSURE: 94 MMHG | BODY MASS INDEX: 30.36 KG/M2 | SYSTOLIC BLOOD PRESSURE: 167 MMHG | TEMPERATURE: 98 F | HEIGHT: 62 IN | OXYGEN SATURATION: 97 %

## 2022-11-17 DIAGNOSIS — N20.1 URETERAL CALCULUS: ICD-10-CM

## 2022-11-17 DIAGNOSIS — N20.1 URETERAL CALCULUS: Primary | ICD-10-CM

## 2022-11-17 PROCEDURE — 1159F PR MEDICATION LIST DOCUMENTED IN MEDICAL RECORD: ICD-10-PCS | Mod: CPTII,,, | Performed by: UROLOGY

## 2022-11-17 PROCEDURE — 3077F SYST BP >= 140 MM HG: CPT | Mod: CPTII,,, | Performed by: UROLOGY

## 2022-11-17 PROCEDURE — 74018 RADEX ABDOMEN 1 VIEW: CPT | Mod: TC

## 2022-11-17 PROCEDURE — 99215 OFFICE O/P EST HI 40 MIN: CPT | Mod: PBBFAC | Performed by: UROLOGY

## 2022-11-17 PROCEDURE — 52310 PR CYSTOSCOPY,REMV CALCULUS,SIMPLE: ICD-10-PCS | Mod: S$PBB,,, | Performed by: UROLOGY

## 2022-11-17 PROCEDURE — 52310 CYSTOSCOPY AND TREATMENT: CPT | Mod: S$PBB,,, | Performed by: UROLOGY

## 2022-11-17 PROCEDURE — 1160F PR REVIEW ALL MEDS BY PRESCRIBER/CLIN PHARMACIST DOCUMENTED: ICD-10-PCS | Mod: CPTII,,, | Performed by: UROLOGY

## 2022-11-17 PROCEDURE — 3008F BODY MASS INDEX DOCD: CPT | Mod: CPTII,,, | Performed by: UROLOGY

## 2022-11-17 PROCEDURE — 3080F DIAST BP >= 90 MM HG: CPT | Mod: CPTII,,, | Performed by: UROLOGY

## 2022-11-17 PROCEDURE — 99024 PR POST-OP FOLLOW-UP VISIT: ICD-10-PCS | Mod: ,,, | Performed by: UROLOGY

## 2022-11-17 PROCEDURE — 1159F MED LIST DOCD IN RCRD: CPT | Mod: CPTII,,, | Performed by: UROLOGY

## 2022-11-17 PROCEDURE — 99024 POSTOP FOLLOW-UP VISIT: CPT | Mod: ,,, | Performed by: UROLOGY

## 2022-11-17 PROCEDURE — 1160F RVW MEDS BY RX/DR IN RCRD: CPT | Mod: CPTII,,, | Performed by: UROLOGY

## 2022-11-17 PROCEDURE — 3077F PR MOST RECENT SYSTOLIC BLOOD PRESSURE >= 140 MM HG: ICD-10-PCS | Mod: CPTII,,, | Performed by: UROLOGY

## 2022-11-17 PROCEDURE — 3080F PR MOST RECENT DIASTOLIC BLOOD PRESSURE >= 90 MM HG: ICD-10-PCS | Mod: CPTII,,, | Performed by: UROLOGY

## 2022-11-17 PROCEDURE — 3008F PR BODY MASS INDEX (BMI) DOCUMENTED: ICD-10-PCS | Mod: CPTII,,, | Performed by: UROLOGY

## 2022-11-17 PROCEDURE — 52310 CYSTOSCOPY AND TREATMENT: CPT | Mod: PBBFAC | Performed by: UROLOGY

## 2022-11-17 RX ORDER — CIPROFLOXACIN 500 MG/1
500 TABLET ORAL
Status: COMPLETED | OUTPATIENT
Start: 2022-11-17 | End: 2022-11-17

## 2022-11-17 RX ORDER — LIDOCAINE HYDROCHLORIDE 20 MG/ML
JELLY TOPICAL
Status: COMPLETED | OUTPATIENT
Start: 2022-11-17 | End: 2022-11-17

## 2022-11-17 RX ADMIN — CIPROFLOXACIN 500 MG: 500 TABLET ORAL at 08:11

## 2022-11-17 RX ADMIN — LIDOCAINE HYDROCHLORIDE: 20 JELLY TOPICAL at 08:11

## 2022-11-17 NOTE — PROGRESS NOTES
CC:  Possible stent removal    HPI:  Michelle Foote is a 56 y.o. female here for follow-up of ureteral calculi.  The patient returns from having had ureteroscopy on 9 November 2022 in [place].  They fragmented and dusted both ureteropelvic junction calculi in the left duplicated system.  She currently has two ureteral stents.  Her only complaints are stent related including urinary frequency and discomfort.  A KUB was obtained today.  She has a history of multiple calculi in the past.  She has never had a 24 hour urine workup.      Lab Results:    Recent Labs     10/19/22  0437   CREATININE 0.61       Imaging:  KUB today:      The two stents are seen in place in the duplicated left collecting system.  There is a small calcification in the lower pole of the left kidney measuring about 3 mm.  This calculus was present on the preoperative KUB as well.  The stones that were present at the ureteropelvic junction of each moiety are no longer present.       Data Review:  I reviewed the operative note from Dr. Jj Sosa dated 9 November 2022.  ROS:  All systems reviewed and are negative except as documented in HPI and/or Assessment and Plan.     Patient Active Problem List:     Patient Active Problem List   Diagnosis    Kidney stone    Ureteral colic        Past Medical History:  Past Medical History:   Diagnosis Date    Diverticular disease of large intestine without perforation or abscess     Kidney stone         Past Surgical History:  Past Surgical History:   Procedure Laterality Date    CYSTOSCOPY W/ URETERAL STENT PLACEMENT N/A 10/14/2022    Procedure: CYSTOSCOPY, WITH URETERAL STENT INSERTION;  Surgeon: Anderson Nova MD;  Location: [facility];  Service: Urology;  Laterality: N/A;        Family History:  History reviewed. No pertinent family history.     Social History:  Social History     Socioeconomic History    Marital status:    Tobacco Use    Smoking status: Never    Smokeless tobacco: Never    Substance and Sexual Activity    Alcohol use: Yes     Alcohol/week: 2.0 standard drinks     Types: 1 Glasses of wine, 1 Cans of beer per week     Comment: social drink    Drug use: Never   Social History Narrative    ** Merged History Encounter **             Allergies:  Review of patient's allergies indicates:   Allergen Reactions    Rifampin Hives    Latex, natural rubber Rash    Rifamycin analogues Rash        Objective:  Vitals:    11/17/22 0753   BP: (!) 167/94   Pulse: 73   Resp: 20   Temp: 97.7 °F (36.5 °C)     General:  Well developed, well nourished adult female in no acute distress  Abdomen: Soft, nontender, no masses  Extremities:  No clubbing, cyanosis, or edema  Neurologic:  Grossly intact  Musculoskeletal:  Normal tone  :  External genitalia is normal without lesions.  Vagina is normal.      Cystoscopy:        - Urethral meatus:  No strictures        - Urethra:  Normal without strictures or lesions        - Bladder neck:  Normal        - Bladder:  No mucosal abnormalities except bullous changes from the stens seen issuing from the left ureteral orifices.          - Ureteral orifices:  On the trigone with clear efflux bilaterally    The stents were grasped and extracted in two passes.  They were inspected and found to be intact.  The patient tolerated the procedure well without complications.  She was given Cipro 500mg, one tablet in the clinic.   The urethra was anesthetized with 2% Lidocaine Jelly, Urojet.      Assessment:  1. Ureteral calculus  - X-Ray Abdomen AP 1 View; Future  - LIDOcaine HCl 2% urojet  - ciprofloxacin HCl tablet 500 mg     Plan:  The patient has had recurrent stone episodes.  Will get a 24 hour urine for stone risk profile.  The order was sent to [facility].      Follow-up:    Return in four to six weeks to discuss the results of the above study.

## 2022-11-17 NOTE — PROGRESS NOTES
Pt seen by Dr. Holliday. Consents for stent removal signed and obtained by staff. 2 removed during procedure. Pt given cipro and lidocaine urojet gel applied to area. Orders will be placed for kidney stone urine panel. RTC 6-8 weeks after 24 hour urine collection. Pt education given both written and verbal.

## 2022-12-12 ENCOUNTER — OFFICE VISIT (OUTPATIENT)
Dept: FAMILY MEDICINE | Facility: CLINIC | Age: 57
End: 2022-12-12
Payer: MEDICAID

## 2022-12-12 VITALS
SYSTOLIC BLOOD PRESSURE: 148 MMHG | DIASTOLIC BLOOD PRESSURE: 82 MMHG | OXYGEN SATURATION: 100 % | RESPIRATION RATE: 18 BRPM | BODY MASS INDEX: 30.55 KG/M2 | WEIGHT: 166 LBS | HEIGHT: 62 IN | HEART RATE: 71 BPM | TEMPERATURE: 98 F

## 2022-12-12 DIAGNOSIS — Z12.12 ENCOUNTER FOR COLORECTAL CANCER SCREENING: Primary | ICD-10-CM

## 2022-12-12 DIAGNOSIS — Z76.89 ENCOUNTER TO ESTABLISH CARE: ICD-10-CM

## 2022-12-12 DIAGNOSIS — Z12.11 ENCOUNTER FOR COLORECTAL CANCER SCREENING: Primary | ICD-10-CM

## 2022-12-12 DIAGNOSIS — Z12.31 ENCOUNTER FOR SCREENING MAMMOGRAM FOR BREAST CANCER: ICD-10-CM

## 2022-12-12 DIAGNOSIS — N20.0 KIDNEY STONE: ICD-10-CM

## 2022-12-12 DIAGNOSIS — I10 PRIMARY HYPERTENSION: ICD-10-CM

## 2022-12-12 PROBLEM — E78.00 HYPERCHOLESTEROLEMIA: Status: ACTIVE | Noted: 2022-12-12

## 2022-12-12 PROCEDURE — 1160F RVW MEDS BY RX/DR IN RCRD: CPT | Mod: CPTII,,, | Performed by: NURSE PRACTITIONER

## 2022-12-12 PROCEDURE — 3077F SYST BP >= 140 MM HG: CPT | Mod: CPTII,,, | Performed by: NURSE PRACTITIONER

## 2022-12-12 PROCEDURE — 1159F MED LIST DOCD IN RCRD: CPT | Mod: CPTII,,, | Performed by: NURSE PRACTITIONER

## 2022-12-12 PROCEDURE — 3008F BODY MASS INDEX DOCD: CPT | Mod: CPTII,,, | Performed by: NURSE PRACTITIONER

## 2022-12-12 PROCEDURE — 99203 OFFICE O/P NEW LOW 30 MIN: CPT | Mod: S$PBB,,, | Performed by: NURSE PRACTITIONER

## 2022-12-12 PROCEDURE — 1160F PR REVIEW ALL MEDS BY PRESCRIBER/CLIN PHARMACIST DOCUMENTED: ICD-10-PCS | Mod: CPTII,,, | Performed by: NURSE PRACTITIONER

## 2022-12-12 PROCEDURE — 3008F PR BODY MASS INDEX (BMI) DOCUMENTED: ICD-10-PCS | Mod: CPTII,,, | Performed by: NURSE PRACTITIONER

## 2022-12-12 PROCEDURE — 3077F PR MOST RECENT SYSTOLIC BLOOD PRESSURE >= 140 MM HG: ICD-10-PCS | Mod: CPTII,,, | Performed by: NURSE PRACTITIONER

## 2022-12-12 PROCEDURE — 99203 PR OFFICE/OUTPT VISIT, NEW, LEVL III, 30-44 MIN: ICD-10-PCS | Mod: S$PBB,,, | Performed by: NURSE PRACTITIONER

## 2022-12-12 PROCEDURE — 3080F DIAST BP >= 90 MM HG: CPT | Mod: CPTII,,, | Performed by: NURSE PRACTITIONER

## 2022-12-12 PROCEDURE — 1159F PR MEDICATION LIST DOCUMENTED IN MEDICAL RECORD: ICD-10-PCS | Mod: CPTII,,, | Performed by: NURSE PRACTITIONER

## 2022-12-12 PROCEDURE — 99215 OFFICE O/P EST HI 40 MIN: CPT | Mod: PBBFAC | Performed by: NURSE PRACTITIONER

## 2022-12-12 PROCEDURE — 3080F PR MOST RECENT DIASTOLIC BLOOD PRESSURE >= 90 MM HG: ICD-10-PCS | Mod: CPTII,,, | Performed by: NURSE PRACTITIONER

## 2022-12-12 RX ORDER — TRAMADOL HYDROCHLORIDE 50 MG/1
1 TABLET ORAL EVERY 8 HOURS PRN
COMMUNITY
Start: 2022-11-09 | End: 2023-04-28 | Stop reason: ALTCHOICE

## 2022-12-12 NOTE — ASSESSMENT & PLAN NOTE
Uncontrolled/blood pressure 154/94  Declined treatment at this time.  Blood pressure log provided to patient, patient to bring log in 4 weeks on her next visit.  Follow a low-salt 2 g diet, follow low-cholesterol, low-fat diet.  Stay active at least 30 minutes a day up to 5 days a week a simple as walking.  Return to clinic as needed.

## 2022-12-12 NOTE — PROGRESS NOTES
"Patient Name: Michelle Foote   : 1965  MRN: [id number]     SUBJECTIVE DATA:    CHIEF COMPLAINT:   Michelle Foote is a 57 y.o. female who presents to clinic today with Establish Care and Sinus Problem        HPI:  57-year-old female presents to the clinic to establish care.  Past medical history hypertension, kidney stones, sinusitis, chronic back pain.  Hypertension:  Uncontrolled, current blood pressure 154/94 with a pulse of 71 beats per minute.  Patient states she used to be on blood pressure in the past. "I do not like take medication" discussed the importance of blood pressure control to decrease the incident of heart attack, stroke, preserved kidneys etcetera and other health risks.  Blood pressure log provided to patient, patient will bring log in 4 weeks or sooner if needed.  Follow low-salt diet 2 g, exercise at least 30 minutes a day up to 5 days a week, follow low-cholesterol, low-fat diet.  Stay hydrated with water.    Kidney stones:  Patient is following up with [facility] recurrent kidney stones.  State had stents removed this year.  Otherwise she has no complaints.    Patient states she had sinus surgery in the past, flare-ups every once in a while, take Claritin as needed.  Patient states she had back surgery about 10 years ago, flare-ups every once in a while, she used to take Advil but not any more, she takes Tylenol as needed.    Patient denies chest pain, shortness of breath, dyspnea on exertion, palpitations, peripheral edema, abdominal pain, nausea, vomiting, diarrhea, constipation, fatigue, fever, chills, dysuria,  hematuria, melena, or hematochezia.       Care gaps:  Patient declined immunization.  Colonoscopy referral initiated, mammogram referral initiated, [facility] referral initiated.    Patient to return in 4 weeks for wellness with fasting blood work.                    ALLERGIES:   Review of patient's allergies indicates:   Allergen Reactions    Rifampin " "Hives    Latex, natural rubber Rash    Rifamycin analogues Rash         ROS:  Review of Systems   Constitutional: Negative.    HENT: Negative.     Eyes: Negative.    Respiratory: Negative.     Cardiovascular: Negative.    Gastrointestinal: Negative.    Genitourinary: Negative.    Musculoskeletal: Negative.    Skin: Negative.    Neurological: Negative.    Endo/Heme/Allergies: Negative.    Psychiatric/Behavioral: Negative.     All other systems reviewed and are negative.      OBJECTIVE DATA:  Vital signs  Vitals:    12/12/22 0743   BP: (!) 154/94   Pulse: 71   Resp: 18   Temp: 98.1 °F (36.7 °C)   TempSrc: Oral   SpO2: 100%   Weight: 75.3 kg (166 lb)   Height: 5' 2" (1.575 m)      Body mass index is 30.36 kg/m².    PHYSICAL EXAM:   Physical Exam  Vitals and nursing note reviewed.   Constitutional:       General: She is awake. She is not in acute distress.     Appearance: Normal appearance. She is well-developed, well-groomed and overweight. She is not ill-appearing, toxic-appearing or diaphoretic.   HENT:      Head: Normocephalic and atraumatic.      Right Ear: Tympanic membrane, ear canal and external ear normal. There is no impacted cerumen.      Left Ear: Tympanic membrane, ear canal and external ear normal. There is no impacted cerumen.      Nose: Nose normal. No congestion or rhinorrhea.      Mouth/Throat:      Lips: Pink.      Mouth: Mucous membranes are moist.      Pharynx: Oropharynx is clear. Uvula midline. No oropharyngeal exudate or posterior oropharyngeal erythema.   Eyes:      General: Lids are normal. Gaze aligned appropriately.      Extraocular Movements: Extraocular movements intact.      Conjunctiva/sclera: Conjunctivae normal.      Pupils: Pupils are equal, round, and reactive to light.   Cardiovascular:      Rate and Rhythm: Normal rate and regular rhythm.      Pulses: Normal pulses.           Radial pulses are 2+ on the right side and 2+ on the left side.      Heart sounds: Normal heart sounds. No " murmur heard.  Pulmonary:      Effort: Pulmonary effort is normal.      Breath sounds: Normal breath sounds and air entry. No wheezing or rhonchi.   Abdominal:      Palpations: Abdomen is soft.      Tenderness: There is no abdominal tenderness. There is no right CVA tenderness or left CVA tenderness.   Musculoskeletal:         General: Normal range of motion.      Cervical back: Normal range of motion and neck supple. No rigidity or tenderness.      Right lower leg: No edema.      Left lower leg: No edema.   Lymphadenopathy:      Cervical: No cervical adenopathy.   Skin:     General: Skin is warm.      Capillary Refill: Capillary refill takes less than 2 seconds.   Neurological:      General: No focal deficit present.      Mental Status: She is alert and oriented to person, place, and time. Mental status is at baseline.      GCS: GCS eye subscore is 4. GCS verbal subscore is 5. GCS motor subscore is 6.      Cranial Nerves: Cranial nerves 2-12 are intact.      Sensory: Sensation is intact.      Motor: Motor function is intact.      Coordination: Coordination is intact.      Gait: Gait is intact.   Psychiatric:         Attention and Perception: Attention and perception normal.         Mood and Affect: Mood and affect normal.         Speech: Speech normal.         Behavior: Behavior normal. Behavior is cooperative.         Thought Content: Thought content normal. Thought content does not include homicidal or suicidal ideation.         Cognition and Memory: Cognition and memory normal.         Judgment: Judgment normal.        ASSESSMENT/PLAN:  1. Encounter for colorectal cancer screening  -     Ambulatory referral/consult to Gastroenterology; Future; Expected date: 12/19/2022    2. Encounter to establish care  -     Mammo Digital Screening Bilat w/ Amanuel; Future; Expected date: 12/12/2022  -     Ambulatory referral/consult to Gynecology; Future; Expected date: 12/19/2022  -     Ambulatory referral/consult to  Gastroenterology; Future; Expected date: 12/19/2022    3. Encounter for screening mammogram for breast cancer  -     Mammo Digital Screening Bilat w/ Amanuel; Future; Expected date: 12/12/2022    4. Primary hypertension  Assessment & Plan:  Uncontrolled/blood pressure 154/94  Declined treatment at this time.  Blood pressure log provided to patient, patient to bring log in 4 weeks on her next visit.  Follow a low-salt 2 g diet, follow low-cholesterol, low-fat diet.  Stay active at least 30 minutes a day up to 5 days a week a simple as walking.  Return to clinic as needed.      5. Kidney stone  Assessment & Plan:  Stable.  Follow-up with urologist.             RESULTS:  No results found for this or any previous visit (from the past 1008 hour(s)).      Follow Up:  Follow up in about 1 month (around 1/13/2023).      Previous medical history/lab work/radiology reviewed and considered during medical management decisions.   Medication list reviewed and medication reconciliation performed.  Patient was provided  and care about his/her current diagnosis (es) and medications including risk/benefit and side effects/adverse events, over the counter medication uses/doses, home self-care and contact precautions,  and red flags and indications for when to seek immediate medical attention.   Patient was advised to continue compliance with current medication list and medical recommendations.  Patient dvised continued compliance with recommended eating habits/ diets for medical conditions and exercise 150 minutes/ week (if possible) for medical condition (s).  Educational handouts and instructions on selected disease management in AVS (After Visit Summary).    All of the patient's questions were answered to patient's satisfaction.   The patient was receptive, expressed verbal understanding and agreement the above plan.         This note was created with the assistance of a voice recognition software or phone dictation. There may  be transcription errors as a result of using this technology however minimal. Effort has been made to assure accuracy of transcription but any obvious errors or omissions should be clarified with the author of the document

## 2023-01-05 ENCOUNTER — OFFICE VISIT (OUTPATIENT)
Dept: UROLOGY | Facility: CLINIC | Age: 58
End: 2023-01-05
Payer: MEDICAID

## 2023-01-05 VITALS
HEIGHT: 62 IN | TEMPERATURE: 98 F | HEART RATE: 74 BPM | DIASTOLIC BLOOD PRESSURE: 82 MMHG | OXYGEN SATURATION: 99 % | WEIGHT: 164.63 LBS | BODY MASS INDEX: 30.29 KG/M2 | SYSTOLIC BLOOD PRESSURE: 154 MMHG

## 2023-01-05 DIAGNOSIS — M54.50 BILATERAL LOW BACK PAIN WITHOUT SCIATICA, UNSPECIFIED CHRONICITY: Primary | ICD-10-CM

## 2023-01-05 DIAGNOSIS — N20.1 URETERAL CALCULUS: ICD-10-CM

## 2023-01-05 PROCEDURE — 3079F PR MOST RECENT DIASTOLIC BLOOD PRESSURE 80-89 MM HG: ICD-10-PCS | Mod: CPTII,,, | Performed by: UROLOGY

## 2023-01-05 PROCEDURE — 3079F DIAST BP 80-89 MM HG: CPT | Mod: CPTII,,, | Performed by: UROLOGY

## 2023-01-05 PROCEDURE — 3077F SYST BP >= 140 MM HG: CPT | Mod: CPTII,,, | Performed by: UROLOGY

## 2023-01-05 PROCEDURE — 1160F RVW MEDS BY RX/DR IN RCRD: CPT | Mod: CPTII,,, | Performed by: UROLOGY

## 2023-01-05 PROCEDURE — 1159F PR MEDICATION LIST DOCUMENTED IN MEDICAL RECORD: ICD-10-PCS | Mod: CPTII,,, | Performed by: UROLOGY

## 2023-01-05 PROCEDURE — 87088 URINE BACTERIA CULTURE: CPT | Performed by: UROLOGY

## 2023-01-05 PROCEDURE — 1159F MED LIST DOCD IN RCRD: CPT | Mod: CPTII,,, | Performed by: UROLOGY

## 2023-01-05 PROCEDURE — 3077F PR MOST RECENT SYSTOLIC BLOOD PRESSURE >= 140 MM HG: ICD-10-PCS | Mod: CPTII,,, | Performed by: UROLOGY

## 2023-01-05 PROCEDURE — 3008F PR BODY MASS INDEX (BMI) DOCUMENTED: ICD-10-PCS | Mod: CPTII,,, | Performed by: UROLOGY

## 2023-01-05 PROCEDURE — 99213 OFFICE O/P EST LOW 20 MIN: CPT | Mod: PBBFAC | Performed by: UROLOGY

## 2023-01-05 PROCEDURE — 99213 OFFICE O/P EST LOW 20 MIN: CPT | Mod: S$PBB,,, | Performed by: UROLOGY

## 2023-01-05 PROCEDURE — 1160F PR REVIEW ALL MEDS BY PRESCRIBER/CLIN PHARMACIST DOCUMENTED: ICD-10-PCS | Mod: CPTII,,, | Performed by: UROLOGY

## 2023-01-05 PROCEDURE — 99213 PR OFFICE/OUTPT VISIT, EST, LEVL III, 20-29 MIN: ICD-10-PCS | Mod: S$PBB,,, | Performed by: UROLOGY

## 2023-01-05 PROCEDURE — 3008F BODY MASS INDEX DOCD: CPT | Mod: CPTII,,, | Performed by: UROLOGY

## 2023-01-05 NOTE — PROGRESS NOTES
"[facility] UROLOGY OFFICE VISIT NOTE  MRN: [id number]  Date: 01/05/2023    Chief Complaint: ureteral calculus (Here for  followup complaints of flank pain )      Subjective:    HPI  Michelle Foote is a 57 y.o. female w/ history of diverticulitis and kidney stones with known left duplicated renal system. presenting to [facility] UROLOGY for follow up.     Pt had 2 stents removed on 11/17/22 in office, was sent home with cipro and lidocaine urojet gel applied to area. She was supposed to return today with results of 24 hour urine collection, however, patient just submitted it yesterday.    Acute complaints: lower back pain x2-3 days, worse on right. Concerned about kidney stones or urinary tract infection. Intermittent increased urinary frequency. Denies any dysuria, fevers, chills, nausea, vomiting, abdominal pain.     Review of Systems  Constitutional: no fever, no chills  CV: no chest pain, no palpitations  Resp: no shortness of breath, no cough, no wheezing  GI: no nausea, no vomiting, no constipation, no diarrhea  : see HPI  Skin: no rash    Current Medications:   Current Outpatient Medications   Medication Sig Dispense Refill    ALPRAZolam (XANAX) 0.25 MG tablet Take 0.25 mg by mouth 3 (three) times daily as needed.      hyoscyamine (ANASPAZ,LEVSIN) 0.125 mg Tab Take 0.125 mg by mouth every 4 (four) hours as needed.      traMADoL (ULTRAM) 50 mg tablet Take 1 tablet by mouth every 8 (eight) hours as needed.       No current facility-administered medications for this visit.       Objective:  Blood pressure (!) 154/82, pulse 74, temperature 98 °F (36.7 °C), temperature source Oral, height 5' 2" (1.575 m), weight 74.7 kg (164 lb 9.6 oz), SpO2 99 %.   Physical Exam  General: in no acute distress  Respiratory: clear to auscultation bilaterally. No wheezes, rhonchi, or rales.  Cardiovascular: regular rate and rhythm. S1/S2 present. No murmurs, gallops or rubs appreciated  Gastrointestinal: soft, non-tender, non-distended with " normal bowel sounds  : no CVA tenderness  MSK: Bilateral paraspinal muscle tenderness, pain greater on the right than left.   Neurologic: Alert and oriented x3    Assessment:   1. Bilateral low back pain without sciatica, unspecified chronicity    2. Ureteral calculus      Plan:  -UA and urine culture ordered   -Follow up on 24 hour urine collection  -May take 1000 mg of Tylenol at bedtime    Return to clinic in 4 weeks for review of lab results.    Jeanna Rocha MD  [employer]  Resident, -3

## 2023-01-07 LAB — BACTERIA UR CULT: NO GROWTH

## 2023-01-09 ENCOUNTER — HOSPITAL ENCOUNTER (OUTPATIENT)
Dept: RADIOLOGY | Facility: HOSPITAL | Age: 58
Discharge: HOME | End: 2023-01-09
Attending: NURSE PRACTITIONER
Payer: MEDICAID

## 2023-01-09 DIAGNOSIS — Z12.31 ENCOUNTER FOR SCREENING MAMMOGRAM FOR BREAST CANCER: ICD-10-CM

## 2023-01-09 DIAGNOSIS — Z76.89 ENCOUNTER TO ESTABLISH CARE: ICD-10-CM

## 2023-01-09 PROCEDURE — 77067 MAMMO DIGITAL SCREENING BILAT WITH TOMO: ICD-10-PCS | Mod: 26,,, | Performed by: RADIOLOGY

## 2023-01-09 PROCEDURE — 77067 SCR MAMMO BI INCL CAD: CPT | Mod: 26,,, | Performed by: RADIOLOGY

## 2023-01-09 PROCEDURE — 77063 BREAST TOMOSYNTHESIS BI: CPT | Mod: 26,,, | Performed by: RADIOLOGY

## 2023-01-09 PROCEDURE — 77063 MAMMO DIGITAL SCREENING BILAT WITH TOMO: ICD-10-PCS | Mod: 26,,, | Performed by: RADIOLOGY

## 2023-01-09 PROCEDURE — 77067 SCR MAMMO BI INCL CAD: CPT | Mod: TC

## 2023-01-11 ENCOUNTER — TELEPHONE (OUTPATIENT)
Dept: FAMILY MEDICINE | Facility: CLINIC | Age: 58
End: 2023-01-11
Payer: MEDICAID

## 2023-01-11 NOTE — TELEPHONE ENCOUNTER
----- Message from EDITH Delacruz sent at 1/10/2023  8:40 PM CST -----  Please notify pt. Of mammogram results:  No suspicious mass, asymmetry, distortion, or calcification is identified.         IMPRESSION: NEGATIVE  Right Breast: Negative (BI-RADS 1)  Left Breast: Negative (BI-RADS 1)     Recommendations:  Recommend continued annual screening mammography (with Digital Breast Tomosynthesis), according to [employer]

## 2023-01-11 NOTE — PROGRESS NOTES
Please notify pt. Of mammogram results:  No suspicious mass, asymmetry, distortion, or calcification is identified.         IMPRESSION: NEGATIVE  Right Breast: Negative (BI-RADS 1)  Left Breast: Negative (BI-RADS 1)     Recommendations:  Recommend continued annual screening mammography (with Digital Breast Tomosynthesis), according to [employer]

## 2023-01-19 ENCOUNTER — HOSPITAL ENCOUNTER (OUTPATIENT)
Dept: RADIOLOGY | Facility: HOSPITAL | Age: 58
Discharge: HOME | End: 2023-01-19
Attending: NURSE PRACTITIONER
Payer: MEDICAID

## 2023-01-19 ENCOUNTER — OFFICE VISIT (OUTPATIENT)
Dept: FAMILY MEDICINE | Facility: CLINIC | Age: 58
End: 2023-01-19
Payer: MEDICAID

## 2023-01-19 VITALS
WEIGHT: 166 LBS | OXYGEN SATURATION: 98 % | RESPIRATION RATE: 18 BRPM | TEMPERATURE: 98 F | DIASTOLIC BLOOD PRESSURE: 87 MMHG | HEART RATE: 67 BPM | SYSTOLIC BLOOD PRESSURE: 128 MMHG | BODY MASS INDEX: 30.55 KG/M2 | HEIGHT: 62 IN

## 2023-01-19 DIAGNOSIS — M25.552 HIP PAIN, ACUTE, LEFT: ICD-10-CM

## 2023-01-19 DIAGNOSIS — Z00.00 WELLNESS EXAMINATION: ICD-10-CM

## 2023-01-19 DIAGNOSIS — M25.552 HIP PAIN, ACUTE, LEFT: Primary | ICD-10-CM

## 2023-01-19 DIAGNOSIS — I10 PRIMARY HYPERTENSION: ICD-10-CM

## 2023-01-19 PROCEDURE — 3079F PR MOST RECENT DIASTOLIC BLOOD PRESSURE 80-89 MM HG: ICD-10-PCS | Mod: CPTII,,, | Performed by: NURSE PRACTITIONER

## 2023-01-19 PROCEDURE — 1159F MED LIST DOCD IN RCRD: CPT | Mod: CPTII,,, | Performed by: NURSE PRACTITIONER

## 2023-01-19 PROCEDURE — 3074F SYST BP LT 130 MM HG: CPT | Mod: CPTII,,, | Performed by: NURSE PRACTITIONER

## 2023-01-19 PROCEDURE — 3079F DIAST BP 80-89 MM HG: CPT | Mod: CPTII,,, | Performed by: NURSE PRACTITIONER

## 2023-01-19 PROCEDURE — 1159F PR MEDICATION LIST DOCUMENTED IN MEDICAL RECORD: ICD-10-PCS | Mod: CPTII,,, | Performed by: NURSE PRACTITIONER

## 2023-01-19 PROCEDURE — 99214 OFFICE O/P EST MOD 30 MIN: CPT | Mod: S$PBB,,, | Performed by: NURSE PRACTITIONER

## 2023-01-19 PROCEDURE — 73502 X-RAY EXAM HIP UNI 2-3 VIEWS: CPT | Mod: TC,LT

## 2023-01-19 PROCEDURE — 99215 OFFICE O/P EST HI 40 MIN: CPT | Mod: PBBFAC | Performed by: NURSE PRACTITIONER

## 2023-01-19 PROCEDURE — 1160F PR REVIEW ALL MEDS BY PRESCRIBER/CLIN PHARMACIST DOCUMENTED: ICD-10-PCS | Mod: CPTII,,, | Performed by: NURSE PRACTITIONER

## 2023-01-19 PROCEDURE — 3008F BODY MASS INDEX DOCD: CPT | Mod: CPTII,,, | Performed by: NURSE PRACTITIONER

## 2023-01-19 PROCEDURE — 3074F PR MOST RECENT SYSTOLIC BLOOD PRESSURE < 130 MM HG: ICD-10-PCS | Mod: CPTII,,, | Performed by: NURSE PRACTITIONER

## 2023-01-19 PROCEDURE — 1160F RVW MEDS BY RX/DR IN RCRD: CPT | Mod: CPTII,,, | Performed by: NURSE PRACTITIONER

## 2023-01-19 PROCEDURE — 99214 PR OFFICE/OUTPT VISIT, EST, LEVL IV, 30-39 MIN: ICD-10-PCS | Mod: S$PBB,,, | Performed by: NURSE PRACTITIONER

## 2023-01-19 PROCEDURE — 3008F PR BODY MASS INDEX (BMI) DOCUMENTED: ICD-10-PCS | Mod: CPTII,,, | Performed by: NURSE PRACTITIONER

## 2023-01-19 RX ORDER — AMLODIPINE BESYLATE 2.5 MG/1
2.5 TABLET ORAL DAILY
Qty: 30 TABLET | Refills: 1 | Status: SHIPPED | OUTPATIENT
Start: 2023-01-19 | End: 2023-04-28 | Stop reason: ALTCHOICE

## 2023-01-19 RX ORDER — LIDOCAINE 560 MG/1
1 PATCH PERCUTANEOUS; TOPICAL; TRANSDERMAL 2 TIMES DAILY PRN
Qty: 30 PATCH | Refills: 6 | Status: SHIPPED | OUTPATIENT
Start: 2023-01-19 | End: 2023-04-28 | Stop reason: ALTCHOICE

## 2023-01-19 RX ORDER — LIDOCAINE 560 MG/1
1 PATCH PERCUTANEOUS; TOPICAL; TRANSDERMAL 2 TIMES DAILY PRN
Qty: 30 PATCH | Refills: 6 | Status: SHIPPED | OUTPATIENT
Start: 2023-01-19 | End: 2023-01-19

## 2023-01-19 NOTE — ASSESSMENT & PLAN NOTE
Uncontrolled/blood pressure 142/82  Declined treatment in the past.  Agreed for low-dose Norvasc 2.5 mg p.o. daily.  Blood pressure log provided to patientFollow a low-salt 2 g diet, follow low-cholesterol, low-fat diet.  Stay active at least 30 minutes a day up to 5 days a week a simple as walking.  Return to clinic as needed.

## 2023-01-19 NOTE — PROGRESS NOTES
Patient Name: Michelle Foote   : 1965  MRN: [id number]     SUBJECTIVE DATA:    CHIEF COMPLAINT:   Michelle Foote is a 57 y.o. female who presents to clinic today with Hypertension and Wellness Bloodwork        HPI: 57-year-old female presents to the clinic blood pressure follow-up, left hip pain.  Past medical history hypertension, kidney stones, sinusitis, chronic back pain.    Hypertension:  Uncontrolled, current blood pressure 142/82 with a pulse of 67 beats per minute.    Follow low-salt diet 2 g, exercise at least 30 minutes a day up to 5 days a week, follow low-cholesterol, low-fat diet.  Stay hydrated with water.  Rx Norvasc 2.5 mg p.o. once daily.    Left hip pain:  Left hip pain at the greater trochanter.  Denies any recent fall or trauma.  Try Tylenol Arthritis 650 mg p.o..  X-rays ordered.     Kidney stones:  Patient is following up with [facility] recurrent kidney stones.  State had stents removed this year.  Otherwise she has no complaints.     Sinus surgery: Patient states she had sinus surgery in the past, flare-ups every once in a while, take Claritin as needed.  Patient states she had back surgery about 10 years ago, flare-ups every once in a while, she used to take Advil but not any more, she takes Tylenol as needed.     Patient denies chest pain, shortness of breath, dyspnea on exertion, palpitations, peripheral edema, abdominal pain, nausea, vomiting, diarrhea, constipation, fatigue, fever, chills, dysuria,  hematuria, melena, or hematochezia.        Care gaps:   Colonoscopy referral initiated, HIV, hep C, hemoglobin A1c initiated will notify patient of test results when they become available.     HPI      ALLERGIES:   Review of patient's allergies indicates:   Allergen Reactions    Rifampin Hives    Latex, natural rubber Rash    Rifamycin analogues Rash         ROS:  Review of Systems   Musculoskeletal:  Positive for joint pain (Left hip.).   All other systems reviewed and are  "negative.      OBJECTIVE DATA:  Vital signs  Vitals:    01/19/23 0754 01/19/23 0853   BP: (!) 142/82 128/87   Pulse: 67    Resp: 18    Temp: 97.9 °F (36.6 °C)    TempSrc: Oral    SpO2: 98%    Weight: 75.3 kg (166 lb)    Height: 5' 2" (1.575 m)       Body mass index is 30.36 kg/m².    PHYSICAL EXAM:   Physical Exam  Vitals and nursing note reviewed.   Constitutional:       General: She is awake.      Appearance: Normal appearance. She is well-developed and well-groomed.   HENT:      Head: Normocephalic and atraumatic.      Right Ear: Hearing, tympanic membrane, ear canal and external ear normal.      Left Ear: Hearing, tympanic membrane, ear canal and external ear normal.      Nose: Nose normal.      Right Nostril: No epistaxis.      Left Nostril: No epistaxis.      Mouth/Throat:      Lips: Pink.      Mouth: Mucous membranes are moist.      Pharynx: Oropharynx is clear. Uvula midline.   Eyes:      General: Lids are normal. Gaze aligned appropriately.      Extraocular Movements: Extraocular movements intact.      Conjunctiva/sclera: Conjunctivae normal.      Pupils: Pupils are equal, round, and reactive to light.   Neck:      Trachea: Trachea and phonation normal.   Cardiovascular:      Rate and Rhythm: Normal rate and regular rhythm.      Pulses: Normal pulses.           Radial pulses are 2+ on the right side and 2+ on the left side.        Dorsalis pedis pulses are 2+ on the right side and 2+ on the left side.      Heart sounds: Normal heart sounds.   Pulmonary:      Effort: Pulmonary effort is normal.      Breath sounds: Normal breath sounds and air entry.   Abdominal:      General: Bowel sounds are normal.      Palpations: Abdomen is soft.   Musculoskeletal:         General: Tenderness (Left hip at the greater trochanter.  Full range of motion but stiff to pain.) present. No swelling, deformity or signs of injury. Normal range of motion.      Cervical back: Full passive range of motion without pain, normal range " of motion and neck supple.      Right lower leg: No edema.      Left lower leg: No edema.   Skin:     General: Skin is warm and dry.      Capillary Refill: Capillary refill takes less than 2 seconds.      Findings: No rash.   Neurological:      General: No focal deficit present.      Mental Status: She is alert and oriented to person, place, and time. Mental status is at baseline.      GCS: GCS eye subscore is 4. GCS verbal subscore is 5. GCS motor subscore is 6.      Cranial Nerves: Cranial nerves 2-12 are intact.      Sensory: Sensation is intact.      Coordination: Coordination is intact.      Gait: Gait is intact.   Psychiatric:         Attention and Perception: Attention and perception normal.         Mood and Affect: Mood normal.         Speech: Speech normal.         Behavior: Behavior normal. Behavior is cooperative.         Thought Content: Thought content normal.         Cognition and Memory: Cognition and memory normal.         Judgment: Judgment normal.        ASSESSMENT/PLAN:  1. Hip pain, acute, left  Assessment & Plan:  Rx lidocaine 4% patch apply as discussed.  Try Tylenol Arthritis 650 mg p.o.   X-rays ordered.  Will notify of test results when they become available.  Discussed ortho referral for injection.  Patient to return if no improvement or she can notify the clinic for referral to ortho.    Orders:  -     X-Ray Hip 2 or 3 views Left (with Pelvis when performed); Future; Expected date: 01/19/2023  -     Discontinue: LIDOcaine 4 % PtMd; Apply 1 each topically 2 (two) times daily as needed (pain).  Dispense: 30 patch; Refill: 6  -     Discontinue: LIDOcaine 4 % PtMd; Apply 1 each topically 2 (two) times daily as needed (pain).  Dispense: 30 patch; Refill: 6  -     LIDOcaine 4 % PtMd; Apply 1 each topically 2 (two) times daily as needed (pain).  Dispense: 30 patch; Refill: 6    2. Primary hypertension  Assessment & Plan:  Uncontrolled/blood pressure 142/82  Declined treatment in the past.  Agreed  for low-dose Norvasc 2.5 mg p.o. daily.  Blood pressure log provided to patientFollow a low-salt 2 g diet, follow low-cholesterol, low-fat diet.  Stay active at least 30 minutes a day up to 5 days a week a simple as walking.  Return to clinic as needed.    Orders:  -     amLODIPine (NORVASC) 2.5 MG tablet; Take 1 tablet (2.5 mg total) by mouth once daily.  Dispense: 30 tablet; Refill: 1  -     Hemoglobin A1C; Future; Expected date: 01/19/2023  -     Hepatitis C Antibody; Future; Expected date: 01/19/2023  -     HIV 1/2 Ag/Ab (4th Gen); Future; Expected date: 01/19/2023    3. Wellness examination  -     Cancel: Hepatitis C Antibody  -     Cancel: HIV 1/2 Ag/Ab (4th Gen)  -     Cancel: Hemoglobin A1C  -     Hemoglobin A1C; Future; Expected date: 01/19/2023  -     Hepatitis C Antibody; Future; Expected date: 01/19/2023  -     HIV 1/2 Ag/Ab (4th Gen); Future; Expected date: 01/19/2023           RESULTS:  Recent Results (from the past 1008 hour(s))   Urine culture    Collection Time: 01/05/23 10:24 AM    Specimen: Urine, Clean Catch   Result Value Ref Range    Urine Culture No Growth    Hemoglobin A1C    Collection Time: 01/19/23 10:07 AM   Result Value Ref Range    Hemoglobin A1c 5.8 <=7.0 %    Estimated Average Glucose 119.8 mg/dL   Hepatitis C Antibody    Collection Time: 01/19/23 10:07 AM   Result Value Ref Range    Hepatitis C Antibody Nonreactive Nonreactive   HIV 1/2 Ag/Ab (4th Gen)    Collection Time: 01/19/23 10:07 AM   Result Value Ref Range    HIV Nonreactive Nonreactive         Follow Up:  Follow up in about 3 months (around 4/19/2023).      Previous medical history/lab work/radiology reviewed and considered during medical management decisions.   Medication list reviewed and medication reconciliation performed.  Patient was provided  and care about his/her current diagnosis (es) and medications including risk/benefit and side effects/adverse events, over the counter medication uses/doses, home  self-care and contact precautions,  and red flags and indications for when to seek immediate medical attention.   Patient was advised to continue compliance with current medication list and medical recommendations.  Patient dvised continued compliance with recommended eating habits/ diets for medical conditions and exercise 150 minutes/ week (if possible) for medical condition (s).  Educational handouts and instructions on selected disease management in AVS (After Visit Summary).    All of the patient's questions were answered to patient's satisfaction.   The patient was receptive, expressed verbal understanding and agreement the above plan.       This note was created with the assistance of a voice recognition software or phone dictation. There may be transcription errors as a result of using this technology however minimal. Effort has been made to assure accuracy of transcription but any obvious errors or omissions should be clarified with the author of the document

## 2023-01-19 NOTE — ASSESSMENT & PLAN NOTE
Rx lidocaine 4% patch apply as discussed.  Try Tylenol Arthritis 650 mg p.o.   X-rays ordered.  Will notify of test results when they become available.  Discussed ortho referral for injection.  Patient to return if no improvement or she can notify the clinic for referral to ortho.

## 2023-01-20 ENCOUNTER — TELEPHONE (OUTPATIENT)
Dept: FAMILY MEDICINE | Facility: CLINIC | Age: 58
End: 2023-01-20
Payer: MEDICAID

## 2023-01-20 NOTE — TELEPHONE ENCOUNTER
Patient returned previous call. Patient given results. Patient verbalized understanding. No additional questions at this time.     Patient states she will hold off on PT and/or ortho referral. Will discuss with you at her 3 month follow up.

## 2023-01-20 NOTE — TELEPHONE ENCOUNTER
----- Message from EDITH Delacruz sent at 1/19/2023  4:25 PM CST -----  PLEASE CALL PATIENTS WITH RESULTS,   Regarding left hip pain shows No acute fractures or dislocations are otherwise identified  Impression: Degenerative changes also known as osteoarthritis , Try Tylenol Arthritis 650 mg p.o. as directed on the label , we can do referral to ortho for injections or  we can initiate physical therapy .    Regarding blood work:  HIV and hep C no sign of infection.  Hemoglobin A1c indicator of diabetes at 5.8% which is within normal range.  Your average blood glucose is 119 which is high.  Need to monitor simple sugars, carbohydrates, exercise such as brisk walking.

## 2023-02-09 ENCOUNTER — TELEPHONE (OUTPATIENT)
Dept: UROLOGY | Facility: CLINIC | Age: 58
End: 2023-02-09
Payer: MEDICAID

## 2023-02-09 NOTE — TELEPHONE ENCOUNTER
Patient called to confirm appointment for Urology on 02/10/2023. Patient confirmed appointment.

## 2023-02-10 ENCOUNTER — OFFICE VISIT (OUTPATIENT)
Dept: UROLOGY | Facility: CLINIC | Age: 58
End: 2023-02-10
Payer: MEDICAID

## 2023-02-10 VITALS
RESPIRATION RATE: 19 BRPM | SYSTOLIC BLOOD PRESSURE: 147 MMHG | HEIGHT: 62 IN | DIASTOLIC BLOOD PRESSURE: 88 MMHG | OXYGEN SATURATION: 100 % | WEIGHT: 162 LBS | TEMPERATURE: 98 F | BODY MASS INDEX: 29.81 KG/M2 | HEART RATE: 63 BPM

## 2023-02-10 DIAGNOSIS — M54.50 ACUTE BILATERAL LOW BACK PAIN WITHOUT SCIATICA: ICD-10-CM

## 2023-02-10 DIAGNOSIS — R30.0 DYSURIA: ICD-10-CM

## 2023-02-10 DIAGNOSIS — N20.0 KIDNEY STONE: Primary | ICD-10-CM

## 2023-02-10 DIAGNOSIS — N20.0 RENAL CALCULUS: Primary | ICD-10-CM

## 2023-02-10 PROCEDURE — 3079F PR MOST RECENT DIASTOLIC BLOOD PRESSURE 80-89 MM HG: ICD-10-PCS | Mod: CPTII,,, | Performed by: UROLOGY

## 2023-02-10 PROCEDURE — 3008F PR BODY MASS INDEX (BMI) DOCUMENTED: ICD-10-PCS | Mod: CPTII,,, | Performed by: UROLOGY

## 2023-02-10 PROCEDURE — 3077F SYST BP >= 140 MM HG: CPT | Mod: CPTII,,, | Performed by: UROLOGY

## 2023-02-10 PROCEDURE — 3079F DIAST BP 80-89 MM HG: CPT | Mod: CPTII,,, | Performed by: UROLOGY

## 2023-02-10 PROCEDURE — 1160F RVW MEDS BY RX/DR IN RCRD: CPT | Mod: CPTII,,, | Performed by: UROLOGY

## 2023-02-10 PROCEDURE — 3077F PR MOST RECENT SYSTOLIC BLOOD PRESSURE >= 140 MM HG: ICD-10-PCS | Mod: CPTII,,, | Performed by: UROLOGY

## 2023-02-10 PROCEDURE — 3008F BODY MASS INDEX DOCD: CPT | Mod: CPTII,,, | Performed by: UROLOGY

## 2023-02-10 PROCEDURE — 99213 PR OFFICE/OUTPT VISIT, EST, LEVL III, 20-29 MIN: ICD-10-PCS | Mod: S$PBB,,, | Performed by: UROLOGY

## 2023-02-10 PROCEDURE — 1159F PR MEDICATION LIST DOCUMENTED IN MEDICAL RECORD: ICD-10-PCS | Mod: CPTII,,, | Performed by: UROLOGY

## 2023-02-10 PROCEDURE — 99213 OFFICE O/P EST LOW 20 MIN: CPT | Mod: S$PBB,,, | Performed by: UROLOGY

## 2023-02-10 PROCEDURE — 99215 OFFICE O/P EST HI 40 MIN: CPT | Mod: PBBFAC | Performed by: UROLOGY

## 2023-02-10 PROCEDURE — 1159F MED LIST DOCD IN RCRD: CPT | Mod: CPTII,,, | Performed by: UROLOGY

## 2023-02-10 PROCEDURE — 87088 URINE BACTERIA CULTURE: CPT | Performed by: UROLOGY

## 2023-02-10 PROCEDURE — 1160F PR REVIEW ALL MEDS BY PRESCRIBER/CLIN PHARMACIST DOCUMENTED: ICD-10-PCS | Mod: CPTII,,, | Performed by: UROLOGY

## 2023-02-10 NOTE — PROGRESS NOTES
Patient seen by Dr. Holliday. Patient instructed to RTC after stone CT which will be a 1 month follow up. Patient will give a urine sample today for a urine culture. Patient will be doing a 24 hr urine collection as well.

## 2023-02-12 LAB — BACTERIA UR CULT: NO GROWTH

## 2023-02-13 ENCOUNTER — PATIENT MESSAGE (OUTPATIENT)
Dept: ADMINISTRATIVE | Facility: HOSPITAL | Age: 58
End: 2023-02-13
Payer: MEDICAID

## 2023-02-15 ENCOUNTER — APPOINTMENT (OUTPATIENT)
Dept: LAB | Facility: HOSPITAL | Age: 58
End: 2023-02-15
Attending: UROLOGY
Payer: MEDICAID

## 2023-02-15 PROCEDURE — 84300 ASSAY OF URINE SODIUM: CPT | Performed by: UROLOGY

## 2023-02-15 PROCEDURE — 83986 ASSAY PH BODY FLUID NOS: CPT | Performed by: UROLOGY

## 2023-02-15 PROCEDURE — 82436 ASSAY OF URINE CHLORIDE: CPT | Performed by: UROLOGY

## 2023-02-15 PROCEDURE — 82507 ASSAY OF CITRATE: CPT | Performed by: UROLOGY

## 2023-02-15 PROCEDURE — 84133 ASSAY OF URINE POTASSIUM: CPT | Performed by: UROLOGY

## 2023-02-15 PROCEDURE — 84560 ASSAY OF URINE/URIC ACID: CPT | Performed by: UROLOGY

## 2023-02-15 PROCEDURE — 83735 ASSAY OF MAGNESIUM: CPT | Performed by: UROLOGY

## 2023-02-15 PROCEDURE — 83945 ASSAY OF OXALATE: CPT | Performed by: UROLOGY

## 2023-02-15 PROCEDURE — 30000890 HC MISC. SEND OUT TEST: Performed by: UROLOGY

## 2023-02-15 PROCEDURE — 84105 ASSAY OF URINE PHOSPHORUS: CPT | Performed by: UROLOGY

## 2023-02-28 LAB — BEAKER SEE SCANNED REPORT: NORMAL

## 2023-02-28 NOTE — PROGRESS NOTES
CC:  Laboratory results    HPI:  Michelle Foote is a 57 y.o. female here for follow-up of kidney stones.  She was supposed to do a 24 hour urine metabolic screen.  She states that she did send it in at least once and they rejected the specimen saying that was not a complete collection.  She is currently complaining of pain in the lower back, left greater than right.  She is also having some nausea and diarrhea.  She thinks she may be passing more stones.  She also is complaining of dysuria and urinary frequency and wonders if she has a urinary tract infection.    ROS:  All systems reviewed and are negative except as documented in HPI and/or Assessment and Plan.     Patient Active Problem List:     Patient Active Problem List   Diagnosis    Kidney stone    Ureteral colic    Hypercholesterolemia    Hypertension    Wellness examination    Hip pain, acute, left        Past Medical History:  Past Medical History:   Diagnosis Date    Diverticular disease of large intestine without perforation or abscess     Kidney stone         Past Surgical History:  Past Surgical History:   Procedure Laterality Date    BACK SURGERY      CYSTOSCOPY W/ URETERAL STENT PLACEMENT N/A 10/14/2022    Procedure: CYSTOSCOPY, WITH URETERAL STENT INSERTION;  Surgeon: Anderson Nova MD;  Location: [facility];  Service: Urology;  Laterality: N/A;    HYSTERECTOMY      SINUS SURGERY          Family History:  Family History   Problem Relation Age of Onset    Heart disease Mother     Hypertension Mother     Heart disease Father     Brain cancer Father         Social History:  Social History     Socioeconomic History    Marital status:    Tobacco Use    Smoking status: Never    Smokeless tobacco: Never   Substance and Sexual Activity    Alcohol use: Yes     Alcohol/week: 2.0 standard drinks     Types: 1 Glasses of wine, 1 Cans of beer per week     Comment: social drink    Drug use: Never    Sexual activity: Not Currently   Social History Narrative     ** Merged History Encounter **          Social Determinants of Health     Financial Resource Strain: Low Risk     Difficulty of Paying Living Expenses: Not hard at all   Food Insecurity: No Food Insecurity    Worried About Running Out of Food in the Last Year: Never true    Ran Out of Food in the Last Year: Never true   Transportation Needs: No Transportation Needs    Lack of Transportation (Medical): No    Lack of Transportation (Non-Medical): No   Physical Activity: Insufficiently Active    Days of Exercise per Week: 2 days    Minutes of Exercise per Session: 40 min   Stress: Stress Concern Present    Feeling of Stress : To some extent   Housing Stability: Unknown    Unable to Pay for Housing in the Last Year: No    Unstable Housing in the Last Year: No        Allergies:  Review of patient's allergies indicates:   Allergen Reactions    Rifampin Hives    Latex, natural rubber Rash    Rifamycin analogues Rash        Objective:  Vitals:    02/10/23 1015   BP: (!) 147/88   Pulse: 63   Resp: 19   Temp: 97.9 °F (36.6 °C)     General:  Well developed, well nourished adult female in no acute distress  Abdomen: Soft, nontender, no masses  Extremities:  No clubbing, cyanosis, or edema  Neurologic:  Grossly intact  Musculoskeletal:  Normal tone    Assessment:  1. Renal calculus  - CT Abdomen Pelvis  Without Contrast; Future    2. Acute bilateral low back pain without sciatica  - CT Abdomen Pelvis  Without Contrast; Future    3. Dysuria  - Urine culture       Plan:  We will get a stone protocol CT to evaluate for recurrent or residual calculi.  She also needs to do the 24 hour urine metabolic screen collection.  I am unsure if her back pain is related to stones but the stone protocol CT will help with that.  Urine culture done today.    Follow-up:    After CT

## 2023-03-28 LAB — CRC RECOMMENDATION EXT: NORMAL

## 2023-04-13 ENCOUNTER — OFFICE VISIT (OUTPATIENT)
Dept: UROLOGY | Facility: CLINIC | Age: 58
End: 2023-04-13
Payer: MEDICAID

## 2023-04-13 VITALS
BODY MASS INDEX: 29.7 KG/M2 | RESPIRATION RATE: 18 BRPM | OXYGEN SATURATION: 100 % | HEIGHT: 62 IN | WEIGHT: 161.38 LBS | TEMPERATURE: 98 F | HEART RATE: 68 BPM | DIASTOLIC BLOOD PRESSURE: 76 MMHG | SYSTOLIC BLOOD PRESSURE: 147 MMHG

## 2023-04-13 DIAGNOSIS — R39.15 URINARY URGENCY: ICD-10-CM

## 2023-04-13 DIAGNOSIS — N20.0 RENAL CALCULUS: Primary | ICD-10-CM

## 2023-04-13 DIAGNOSIS — Q62.5 DUPLICATED COLLECTING SYSTEM: ICD-10-CM

## 2023-04-13 PROCEDURE — 99215 OFFICE O/P EST HI 40 MIN: CPT | Mod: PBBFAC | Performed by: UROLOGY

## 2023-04-13 PROCEDURE — 1160F PR REVIEW ALL MEDS BY PRESCRIBER/CLIN PHARMACIST DOCUMENTED: ICD-10-PCS | Mod: CPTII,,, | Performed by: UROLOGY

## 2023-04-13 PROCEDURE — 3077F PR MOST RECENT SYSTOLIC BLOOD PRESSURE >= 140 MM HG: ICD-10-PCS | Mod: CPTII,,, | Performed by: UROLOGY

## 2023-04-13 PROCEDURE — 99213 OFFICE O/P EST LOW 20 MIN: CPT | Mod: S$PBB,,, | Performed by: UROLOGY

## 2023-04-13 PROCEDURE — 3077F SYST BP >= 140 MM HG: CPT | Mod: CPTII,,, | Performed by: UROLOGY

## 2023-04-13 PROCEDURE — 3078F DIAST BP <80 MM HG: CPT | Mod: CPTII,,, | Performed by: UROLOGY

## 2023-04-13 PROCEDURE — 1160F RVW MEDS BY RX/DR IN RCRD: CPT | Mod: CPTII,,, | Performed by: UROLOGY

## 2023-04-13 PROCEDURE — 99213 PR OFFICE/OUTPT VISIT, EST, LEVL III, 20-29 MIN: ICD-10-PCS | Mod: S$PBB,,, | Performed by: UROLOGY

## 2023-04-13 PROCEDURE — 3078F PR MOST RECENT DIASTOLIC BLOOD PRESSURE < 80 MM HG: ICD-10-PCS | Mod: CPTII,,, | Performed by: UROLOGY

## 2023-04-13 PROCEDURE — 1159F PR MEDICATION LIST DOCUMENTED IN MEDICAL RECORD: ICD-10-PCS | Mod: CPTII,,, | Performed by: UROLOGY

## 2023-04-13 PROCEDURE — 3008F BODY MASS INDEX DOCD: CPT | Mod: CPTII,,, | Performed by: UROLOGY

## 2023-04-13 PROCEDURE — 87088 URINE BACTERIA CULTURE: CPT | Performed by: UROLOGY

## 2023-04-13 PROCEDURE — 1159F MED LIST DOCD IN RCRD: CPT | Mod: CPTII,,, | Performed by: UROLOGY

## 2023-04-13 PROCEDURE — 3008F PR BODY MASS INDEX (BMI) DOCUMENTED: ICD-10-PCS | Mod: CPTII,,, | Performed by: UROLOGY

## 2023-04-13 RX ORDER — OXYBUTYNIN CHLORIDE 10 MG/1
10 TABLET, EXTENDED RELEASE ORAL DAILY
Qty: 90 TABLET | Refills: 3 | Status: SHIPPED | OUTPATIENT
Start: 2023-04-13 | End: 2023-04-28 | Stop reason: ALTCHOICE

## 2023-04-13 NOTE — PROGRESS NOTES
CC:  Follow-up    HPI:  Michelle Foote is a 57 y.o. female here for follow-up of renal calculi.  She has a history renal calculi.  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  She says she continues to have pain intermittently because the stones are still in the kidneys.  She had a CT for today's visit and also did a metabolic screen.  She is complaining of urgency and intermittent dysuria.  This feels like she may be getting a urinary tract infection.  She takes AZO prn.      Lab Results:  24 hour urine metabolic screen shows only hypercalciuria.    Imaging:  CT - 15 February 2023:  There are four stones in the left kidney with the largest measuring 5 mm.  There are three stones in the right kidney with the largest measuring 5 mm.        ROS:  All systems reviewed and are negative except as documented in HPI and/or Assessment and Plan.     Patient Active Problem List:     Patient Active Problem List   Diagnosis    Kidney stone    Ureteral colic    Hypercholesterolemia    Hypertension    Wellness examination    Hip pain, acute, left        Past Medical History:  Past Medical History:   Diagnosis Date    Diverticular disease of large intestine without perforation or abscess     Kidney stone         Past Surgical History:  Past Surgical History:   Procedure Laterality Date    BACK SURGERY      CYSTOSCOPY W/ URETERAL STENT PLACEMENT N/A 10/14/2022    Procedure: CYSTOSCOPY, WITH URETERAL STENT INSERTION;  Surgeon: Anderson Nova MD;  Location: [facility];  Service: Urology;  Laterality: N/A;    HYSTERECTOMY      SINUS SURGERY          Family History:  Family History   Problem Relation Age of Onset    Heart disease Mother     Hypertension Mother     Heart disease Father     Brain cancer Father         Social History:  Social History     Socioeconomic History    Marital status:    Tobacco Use    Smoking status: Never    Smokeless tobacco: Never   Substance and Sexual Activity    Alcohol use: Yes     Alcohol/week:  2.0 standard drinks     Types: 1 Glasses of wine, 1 Cans of beer per week     Comment: social drink    Drug use: Never    Sexual activity: Not Currently   Social History Narrative    ** Merged History Encounter **          Social Determinants of Health     Financial Resource Strain: Low Risk     Difficulty of Paying Living Expenses: Not hard at all   Food Insecurity: No Food Insecurity    Worried About Running Out of Food in the Last Year: Never true    Ran Out of Food in the Last Year: Never true   Transportation Needs: No Transportation Needs    Lack of Transportation (Medical): No    Lack of Transportation (Non-Medical): No   Physical Activity: Insufficiently Active    Days of Exercise per Week: 2 days    Minutes of Exercise per Session: 40 min   Stress: Stress Concern Present    Feeling of Stress : To some extent   Housing Stability: Unknown    Unable to Pay for Housing in the Last Year: No    Unstable Housing in the Last Year: No        Allergies:  Review of patient's allergies indicates:   Allergen Reactions    Rifampin Hives    Latex, natural rubber Rash    Rifamycin analogues Rash        Objective:  Vitals:    04/13/23 1121   BP: (!) 147/76   Pulse: 68   Resp: 18   Temp: 98.3 °F (36.8 °C)     General:  Well developed, well nourished adult female in no acute distress  Abdomen: Soft, nontender, no masses  Extremities:  No clubbing, cyanosis, or edema  Neurologic:  Grossly intact  Musculoskeletal:  Normal tone    Assessment:  1. Renal calculus  - X-Ray Abdomen AP 1 View; Future    2. Urinary urgency  - Urine culture  - oxybutynin (DITROPAN-XL) 10 MG 24 hr tablet; Take 1 tablet (10 mg total) by mouth once daily.  Dispense: 90 tablet; Refill: 3    3. Duplicated collecting system       Plan:  She has persistent bilateral renal calculi.  Will observe these to make sure that they are not increasing in size.  A urine culture was sent today.  She was also given oxybutynin as this sounds like it may be overactive  bladder.    Follow-up:    Six months after KUB.

## 2023-04-13 NOTE — PROGRESS NOTES
Pt seen by Dr. Holliday; Urine collected & sent to lab; Medication: Oxybutynin sent to pt preferred pharmacy; Pt instructed to return to clinic in 6 months w/KUB; Discharge paperwork given w/pt verbalizing understanding

## 2023-04-15 LAB — BACTERIA UR CULT: NORMAL

## 2023-04-24 PROBLEM — Z00.00 WELLNESS EXAMINATION: Status: RESOLVED | Noted: 2022-12-12 | Resolved: 2023-04-24

## 2023-04-28 ENCOUNTER — HOSPITAL ENCOUNTER (EMERGENCY)
Facility: HOSPITAL | Age: 58
Discharge: HOME | End: 2023-04-28
Attending: FAMILY MEDICINE
Payer: MEDICAID

## 2023-04-28 VITALS
OXYGEN SATURATION: 99 % | TEMPERATURE: 99 F | RESPIRATION RATE: 20 BRPM | BODY MASS INDEX: 29.94 KG/M2 | DIASTOLIC BLOOD PRESSURE: 77 MMHG | HEIGHT: 62 IN | WEIGHT: 162.69 LBS | SYSTOLIC BLOOD PRESSURE: 140 MMHG | HEART RATE: 75 BPM

## 2023-04-28 DIAGNOSIS — R21 RASH: Primary | ICD-10-CM

## 2023-04-28 PROCEDURE — 99284 EMERGENCY DEPT VISIT MOD MDM: CPT

## 2023-04-28 PROCEDURE — 96372 THER/PROPH/DIAG INJ SC/IM: CPT | Performed by: PHYSICIAN ASSISTANT

## 2023-04-28 PROCEDURE — 63600175 PHARM REV CODE 636 W HCPCS: Performed by: PHYSICIAN ASSISTANT

## 2023-04-28 RX ORDER — SOD SULF/POT CHLORIDE/MAG SULF 1.479 G
TABLET ORAL
COMMUNITY
Start: 2023-02-06

## 2023-04-28 RX ORDER — PREDNISONE 10 MG/1
TABLET ORAL
Qty: 40 TABLET | Refills: 0 | Status: SHIPPED | OUTPATIENT
Start: 2023-04-28 | End: 2023-04-28 | Stop reason: SDUPTHER

## 2023-04-28 RX ORDER — DEXAMETHASONE SODIUM PHOSPHATE 4 MG/ML
8 INJECTION, SOLUTION INTRA-ARTICULAR; INTRALESIONAL; INTRAMUSCULAR; INTRAVENOUS; SOFT TISSUE
Status: COMPLETED | OUTPATIENT
Start: 2023-04-28 | End: 2023-04-28

## 2023-04-28 RX ORDER — PREDNISONE 10 MG/1
TABLET ORAL
Qty: 40 TABLET | Refills: 0 | Status: SHIPPED | OUTPATIENT
Start: 2023-04-28

## 2023-04-28 RX ADMIN — DEXAMETHASONE SODIUM PHOSPHATE 8 MG: 4 INJECTION, SOLUTION INTRA-ARTICULAR; INTRALESIONAL; INTRAMUSCULAR; INTRAVENOUS; SOFT TISSUE at 02:04

## 2023-04-28 NOTE — ED PROVIDER NOTES
Encounter Date: 4/28/2023       History     Chief Complaint   Patient presents with    Rash     Generalized scatter rash x 4 days after being exposed to poison oak, c/o itching     Patient reports to the ER with complaints of a rash to her arms after working in the yard and being exposed to poison oak    The history is provided by the patient.   Rash   This is a new problem. The current episode started several days ago. The problem has been unchanged. The problem is associated with plant contact. The rash is present on the left arm and right arm. The pain has been Constant since onset. Associated symptoms include itching. Treatments tried: OTC calamine.   Review of patient's allergies indicates:   Allergen Reactions    Rifampin Hives    Rifamycin analogues Rash     Past Medical History:   Diagnosis Date    Diverticular disease of large intestine without perforation or abscess     Kidney stone      Past Surgical History:   Procedure Laterality Date    BACK SURGERY      CYSTOSCOPY W/ URETERAL STENT PLACEMENT N/A 10/14/2022    Procedure: CYSTOSCOPY, WITH URETERAL STENT INSERTION;  Surgeon: Anderson Nova MD;  Location: [facility];  Service: Urology;  Laterality: N/A;    HYSTERECTOMY      SINUS SURGERY       Family History   Problem Relation Age of Onset    Heart disease Mother     Hypertension Mother     Heart disease Father     Brain cancer Father      Social History     Tobacco Use    Smoking status: Never    Smokeless tobacco: Never   Substance Use Topics    Alcohol use: Yes     Alcohol/week: 2.0 standard drinks     Types: 1 Glasses of wine, 1 Cans of beer per week     Comment: social drink    Drug use: Never     Review of Systems   Constitutional:  Negative for fever.   HENT:  Negative for sore throat.    Eyes: Negative.    Respiratory:  Negative for shortness of breath.    Cardiovascular:  Negative for chest pain.   Gastrointestinal:  Negative for nausea.   Genitourinary:  Negative for dysuria.   Musculoskeletal:   Negative for back pain.   Skin:  Positive for itching and rash.   Neurological:  Negative for weakness.   Hematological:  Does not bruise/bleed easily.   Psychiatric/Behavioral: Negative.       Physical Exam     Initial Vitals [04/28/23 1302]   BP Pulse Resp Temp SpO2   (!) 140/77 75 20 98.5 °F (36.9 °C) 99 %      MAP       --         Physical Exam    Vitals reviewed.  Constitutional: She appears well-developed and well-nourished.   HENT:   Head: Normocephalic and atraumatic.   Eyes: Conjunctivae and EOM are normal. Pupils are equal, round, and reactive to light.   Neck: Neck supple.   Normal range of motion.  Cardiovascular:  Normal rate, regular rhythm and normal heart sounds.           Pulmonary/Chest: Breath sounds normal. No respiratory distress. She has no wheezes. She exhibits no tenderness.   Abdominal: Abdomen is soft. Bowel sounds are normal. There is no abdominal tenderness.   Musculoskeletal:         General: Normal range of motion.      Cervical back: Normal range of motion and neck supple.     Neurological: She is alert and oriented to person, place, and time. She displays normal reflexes. No cranial nerve deficit or sensory deficit.   Skin: Skin is warm and dry. Rash noted. There is erythema.        With assoc blisters   Psychiatric: She has a normal mood and affect. Her behavior is normal. Judgment and thought content normal.       ED Course   Procedures  Labs Reviewed - No data to display       Imaging Results    None          Medications   dexAMETHasone injection 8 mg (8 mg Intramuscular Given 4/28/23 1413)                              Clinical Impression:   Final diagnoses:  [R21] Rash (Primary)        ED Disposition Condition    Discharge Stable          ED Prescriptions       Medication Sig Dispense Start Date End Date Auth. Provider    predniSONE (DELTASONE) 10 MG tablet  (Status: Discontinued) Take 3 tabs twice daily (once in morning, once at night ) x 3 days, then  Take 2 tabs twice daily  (once in morning, once at night ) x 3 days, then   Take 1 tab twice daily (once in morning, once at night)  x 3 days, then  Take 1 tab daily in the morning x3 days 40 tablet 4/28/2023 4/28/2023 GERSON Fernandez    predniSONE (DELTASONE) 10 MG tablet Take 3 tabs twice daily (once in morning, once at night ) x 3 days, then Take 2 tabs twice daily (once in morning, once at night ) x 3 days, then Take 1 tab twice daily (once in morning, once at night)  x 3 days, then Take 1 tab daily in the morning x3 days 40 tablet 4/28/2023 -- GERSON Fernandez          Follow-up Information       Follow up With Specialties Details Why Contact Info    discharge followup    If your symptoms become WORSE or you DO NOT IMPROVE and you are unable to reach your health care provider, you should RETURN to the emergency department    discharge info    Discussed all pertinent ED information, results, diagnosis and treatment plan; All questions and concerns were addressed at this time. Patient voices understanding of information and instructions. Patient is comfortable with plan and discharge             GERSON Fernandez  04/28/23 8380

## 2023-05-02 ENCOUNTER — HOSPITAL ENCOUNTER (EMERGENCY)
Facility: HOSPITAL | Age: 58
Discharge: HOME | End: 2023-05-02
Attending: INTERNAL MEDICINE
Payer: MEDICAID

## 2023-05-02 VITALS
OXYGEN SATURATION: 100 % | TEMPERATURE: 98 F | DIASTOLIC BLOOD PRESSURE: 75 MMHG | SYSTOLIC BLOOD PRESSURE: 123 MMHG | RESPIRATION RATE: 18 BRPM | HEART RATE: 83 BPM

## 2023-05-02 DIAGNOSIS — B96.89 SINUSITIS, BACTERIAL: Primary | ICD-10-CM

## 2023-05-02 DIAGNOSIS — J32.9 SINUSITIS, BACTERIAL: Primary | ICD-10-CM

## 2023-05-02 DIAGNOSIS — R07.9 CHEST PAIN: ICD-10-CM

## 2023-05-02 LAB
ANION GAP SERPL CALC-SCNC: 11 MEQ/L
BASOPHILS # BLD AUTO: 0.05 X10(3)/MCL
BASOPHILS NFR BLD AUTO: 0.4 %
BUN SERPL-MCNC: 21.3 MG/DL (ref 9.8–20.1)
CALCIUM SERPL-MCNC: 10.2 MG/DL (ref 8.4–10.2)
CHLORIDE SERPL-SCNC: 107 MMOL/L (ref 98–107)
CO2 SERPL-SCNC: 26 MMOL/L (ref 22–29)
CREAT SERPL-MCNC: 0.98 MG/DL (ref 0.55–1.02)
CREAT/UREA NIT SERPL: 22
EOSINOPHIL # BLD AUTO: 0.02 X10(3)/MCL (ref 0–0.9)
EOSINOPHIL NFR BLD AUTO: 0.2 %
ERYTHROCYTE [DISTWIDTH] IN BLOOD BY AUTOMATED COUNT: 13.3 % (ref 11.5–17)
GFR SERPLBLD CREATININE-BSD FMLA CKD-EPI: >60 MLS/MIN/1.73/M2
GLUCOSE SERPL-MCNC: 177 MG/DL (ref 74–100)
HCT VFR BLD AUTO: 44.7 % (ref 37–47)
HGB BLD-MCNC: 14 G/DL (ref 12–16)
IMM GRANULOCYTES # BLD AUTO: 0.05 X10(3)/MCL (ref 0–0.04)
IMM GRANULOCYTES NFR BLD AUTO: 0.4 %
LYMPHOCYTES # BLD AUTO: 1.94 X10(3)/MCL (ref 0.6–4.6)
LYMPHOCYTES NFR BLD AUTO: 16.5 %
MCH RBC QN AUTO: 26.8 PG (ref 27–31)
MCHC RBC AUTO-ENTMCNC: 31.3 G/DL (ref 33–36)
MCV RBC AUTO: 85.6 FL (ref 80–94)
MONOCYTES # BLD AUTO: 0.53 X10(3)/MCL (ref 0.1–1.3)
MONOCYTES NFR BLD AUTO: 4.5 %
NEUTROPHILS # BLD AUTO: 9.19 X10(3)/MCL (ref 2.1–9.2)
NEUTROPHILS NFR BLD AUTO: 78 %
NRBC BLD AUTO-RTO: 0 %
PLATELET # BLD AUTO: 381 X10(3)/MCL (ref 130–400)
PMV BLD AUTO: 9 FL (ref 7.4–10.4)
POTASSIUM SERPL-SCNC: 4.3 MMOL/L (ref 3.5–5.1)
RBC # BLD AUTO: 5.22 X10(6)/MCL (ref 4.2–5.4)
SODIUM SERPL-SCNC: 144 MMOL/L (ref 136–145)
TROPONIN I SERPL-MCNC: <0.01 NG/ML (ref 0–0.04)
WBC # SPEC AUTO: 11.78 X10(3)/MCL (ref 4.5–11.5)

## 2023-05-02 PROCEDURE — 99285 EMERGENCY DEPT VISIT HI MDM: CPT | Mod: 25

## 2023-05-02 PROCEDURE — 93005 ELECTROCARDIOGRAM TRACING: CPT

## 2023-05-02 PROCEDURE — 84484 ASSAY OF TROPONIN QUANT: CPT | Performed by: INTERNAL MEDICINE

## 2023-05-02 PROCEDURE — 85025 COMPLETE CBC W/AUTO DIFF WBC: CPT | Performed by: PHYSICIAN ASSISTANT

## 2023-05-02 PROCEDURE — 80048 BASIC METABOLIC PNL TOTAL CA: CPT | Performed by: INTERNAL MEDICINE

## 2023-05-02 RX ORDER — BENZONATATE 200 MG/1
200 CAPSULE ORAL 3 TIMES DAILY PRN
Qty: 20 CAPSULE | Refills: 0 | Status: SHIPPED | OUTPATIENT
Start: 2023-05-02

## 2023-05-02 RX ORDER — AMOXICILLIN 875 MG/1
875 TABLET, FILM COATED ORAL 2 TIMES DAILY
Qty: 14 TABLET | Refills: 0 | Status: SHIPPED | OUTPATIENT
Start: 2023-05-02 | End: 2024-01-17

## 2023-05-02 NOTE — FIRST PROVIDER EVALUATION
Medical screening examination initiated.  I have conducted a focused provider triage encounter, findings are as follows:    Brief history of present illness:  Patient with pmhx of kidney stones presents today c/o chest pain that started about 6 hours ago. She is currently on prednisone for poison ivy and says it is making her feel jittery.     There were no vitals filed for this visit.    Pertinent physical exam:  Vitals stable     Brief workup plan:  EKG, labs, CXR     Preliminary workup initiated; this workup will be continued and followed by the physician or advanced practice provider that is assigned to the patient when roomed.

## 2023-05-02 NOTE — ED PROVIDER NOTES
Encounter Date: 5/2/2023       History     Chief Complaint   Patient presents with    Chest Pain     PT HERE LAST WK CO RASH, TAKING RX MEDS BUT STEROIDS MAKES HER FEEL JITTERY. NOW CO COUGH, CONGESTION AND CHEST TIGHTNESS SINCE THIS AM.  EKG OBTAINED.      Cough     Presents with cough, congestion, post nasal drip and chills for few days. States recently evaluated for poison oak rash and prescribed steroids, concern she was exposed to something while in ED.    The history is provided by the patient.   Review of patient's allergies indicates:   Allergen Reactions    Rifampin Hives    Rifamycin analogues Rash     Past Medical History:   Diagnosis Date    Diverticular disease of large intestine without perforation or abscess     Kidney stone      Past Surgical History:   Procedure Laterality Date    BACK SURGERY      CYSTOSCOPY W/ URETERAL STENT PLACEMENT N/A 10/14/2022    Procedure: CYSTOSCOPY, WITH URETERAL STENT INSERTION;  Surgeon: Anderson Nova MD;  Location: [facility];  Service: Urology;  Laterality: N/A;    HYSTERECTOMY      SINUS SURGERY       Family History   Problem Relation Age of Onset    Heart disease Mother     Hypertension Mother     Heart disease Father     Brain cancer Father      Social History     Tobacco Use    Smoking status: Never    Smokeless tobacco: Never   Substance Use Topics    Alcohol use: Yes     Alcohol/week: 2.0 standard drinks     Types: 1 Glasses of wine, 1 Cans of beer per week     Comment: social drink    Drug use: Never     Review of Systems   Constitutional:  Positive for chills. Negative for fever.   HENT:  Positive for congestion, postnasal drip and rhinorrhea. Negative for sore throat.    Respiratory:  Positive for cough. Negative for shortness of breath.    Cardiovascular:  Positive for chest pain.   Gastrointestinal:  Negative for nausea.   Genitourinary:  Negative for dysuria.   Musculoskeletal:  Negative for back pain.   Skin:  Positive for rash.   Neurological:  Negative  for weakness.   Hematological:  Does not bruise/bleed easily.   All other systems reviewed and are negative.    Physical Exam     Initial Vitals [05/02/23 1538]   BP Pulse Resp Temp SpO2   123/75 83 18 97.9 °F (36.6 °C) 100 %      MAP       --         Physical Exam    Nursing note and vitals reviewed.  Constitutional: She appears well-developed and well-nourished. No distress.   HENT:   Head: Normocephalic and atraumatic.   Nose: Nose normal.   Mouth/Throat: Oropharynx is clear and moist. No oropharyngeal exudate.   Eyes: Conjunctivae and EOM are normal. Pupils are equal, round, and reactive to light.   Neck: Neck supple. No thyromegaly present. No tracheal deviation present. No JVD present.   Normal range of motion.  Cardiovascular:  Normal rate, regular rhythm, normal heart sounds and intact distal pulses.           Pulmonary/Chest: Breath sounds normal.   Abdominal: Abdomen is soft. Bowel sounds are normal. She exhibits no distension. There is no abdominal tenderness. There is no rebound and no guarding.   Musculoskeletal:         General: No edema. Normal range of motion.      Cervical back: Normal range of motion and neck supple.     Neurological: She is alert and oriented to person, place, and time. She has normal strength. GCS score is 15. GCS eye subscore is 4. GCS verbal subscore is 5. GCS motor subscore is 6.   Skin: Skin is warm and dry. Rash (right forearm rash suggestive of dermatitis) noted.   Psychiatric: Her behavior is normal.       ED Course   Procedures  Labs Reviewed   CBC WITH DIFFERENTIAL - Abnormal; Notable for the following components:       Result Value    WBC 11.78 (*)     MCH 26.8 (*)     MCHC 31.3 (*)     IG# 0.05 (*)     All other components within normal limits   BASIC METABOLIC PANEL - Abnormal; Notable for the following components:    Glucose Level 177 (*)     Blood Urea Nitrogen 21.3 (*)     All other components within normal limits   TROPONIN I - Normal   CBC W/ AUTO DIFFERENTIAL     Narrative:     The following orders were created for panel order CBC auto differential.  Procedure                               Abnormality         Status                     ---------                               -----------         ------                     CBC with Differential[[id number]]        Abnormal            Final result                 Please view results for these tests on the individual orders.   EXTRA TUBES    Narrative:     The following orders were created for panel order EXTRA TUBES.  Procedure                               Abnormality         Status                     ---------                               -----------         ------                     Light Blue Top Hold[[id number]]                              In process                   Please view results for these tests on the individual orders.   LIGHT BLUE TOP HOLD   COVID/FLU A&B PCR     EKG Readings: (Independently Interpreted)   Initial Reading: No STEMI. Rhythm: Normal Sinus Rhythm. Heart Rate: 82. Ectopy: No Ectopy. Conduction: Normal. ST Segments: Normal ST Segments. T Waves: Normal. Axis: Normal. Clinical Impression: Normal Sinus Rhythm   ECG Results              EKG 12-lead (In process)  Result time 05/02/23 15:40:27      In process by Interface, Lab In [facility] (05/02/23 15:40:27)                   Narrative:    Test Reason : R07.9,    Vent. Rate : 083 BPM     Atrial Rate : 083 BPM     P-R Int : 152 ms          QRS Dur : 082 ms      QT Int : 336 ms       P-R-T Axes : 049 025 025 degrees     QTc Int : 394 ms    Normal sinus rhythm with sinus arrhythmia  Normal ECG  When compared with ECG of 04-NOV-2022 11:48,  No significant change was found    Referred By:             Confirmed By:                                   Imaging Results              X-Ray Chest PA And Lateral (Final result)  Result time 05/02/23 16:28:01      Final result by James Trejo MD (05/02/23 16:28:01)                   Impression:      No acute  cardiopulmonary abnormality.      Electronically signed by:  Trejo  Date:    05/02/2023  Time:    16:28               Narrative:    EXAMINATION:  XR CHEST PA AND LATERAL    CLINICAL HISTORY:  Chest pain, unspecified    COMPARISON:  22 December 2017    FINDINGS:  PA and lateral views of the chest were obtained. Heart and mediastinum within normal limits. The lungs are clear. No pneumothorax or significant effusion.                                       Medications - No data to display                       Pt refused COVID/Flu test    Clinical Impression:   Final diagnoses:  [R07.9] Chest pain  [J32.9, B96.89] Sinusitis, bacterial (Primary)        ED Disposition Condition    Discharge Stable          ED Prescriptions       Medication Sig Dispense Start Date End Date Auth. Provider    amoxicillin (AMOXIL) 875 MG tablet Take 1 tablet (875 mg total) by mouth 2 (two) times daily. 14 tablet 5/2/2023 -- Fernando Rapp MD    benzonatate (TESSALON) 200 MG capsule Take 1 capsule (200 mg total) by mouth 3 (three) times daily as needed for Cough. 20 capsule 5/2/2023 -- Fernando Rapp MD          Follow-up Information       Follow up With Specialties Details Why Contact Info    [facility] Emergency Medicine  If symptoms worsen [facility] [address] [phone]    [facility]  Schedule an appointment as soon as possible for a visit in 2 months  [facility] [address]             Fernando Rapp MD  05/02/23 5774

## 2023-05-30 ENCOUNTER — PATIENT MESSAGE (OUTPATIENT)
Dept: ADMINISTRATIVE | Facility: HOSPITAL | Age: 58
End: 2023-05-30
Payer: MEDICAID

## 2023-05-31 ENCOUNTER — DOCUMENTATION ONLY (OUTPATIENT)
Dept: ADMINISTRATIVE | Facility: HOSPITAL | Age: 58
End: 2023-05-31
Payer: MEDICAID

## 2023-05-31 ENCOUNTER — PATIENT OUTREACH (OUTPATIENT)
Dept: ADMINISTRATIVE | Facility: HOSPITAL | Age: 58
End: 2023-05-31
Payer: MEDICAID

## 2023-05-31 NOTE — LETTER
AUTHORIZATION FOR RELEASE OF   CONFIDENTIAL INFORMATION    Dear Dr Long,    We are seeing Michelle Foote, date of birth 1965, in the clinic at [facility]. EDITH Delacruz is the patient's PCP. Michelle Foote has an outstanding lab/procedure at the time we reviewed her chart. In order to help keep her health information updated, she has authorized us to request the following medical record(s):                       (  X)  COLONOSCOPY & Any Associated pathology           Please fax records to KACI Felipe at [phone].     If you have any questions, please contact me at [phone].    Thank you.            Patient Name: Michelle Foote  : 1965  Patient Phone #: [phone]

## 2023-06-05 ENCOUNTER — PATIENT OUTREACH (OUTPATIENT)
Dept: ADMINISTRATIVE | Facility: HOSPITAL | Age: 58
End: 2023-06-05
Payer: MEDICAID

## 2023-06-05 NOTE — PROGRESS NOTES
Colon pathology report received from Dr Long. Documentation scanned into media and attached to  Colonoscopy order 3/28/2023.

## 2023-08-22 ENCOUNTER — HOSPITAL ENCOUNTER (EMERGENCY)
Facility: HOSPITAL | Age: 58
Discharge: HOME | End: 2023-08-22
Attending: INTERNAL MEDICINE
Payer: MEDICAID

## 2023-08-22 VITALS
TEMPERATURE: 98 F | OXYGEN SATURATION: 99 % | BODY MASS INDEX: 28.8 KG/M2 | HEIGHT: 62 IN | SYSTOLIC BLOOD PRESSURE: 170 MMHG | DIASTOLIC BLOOD PRESSURE: 92 MMHG | RESPIRATION RATE: 18 BRPM | HEART RATE: 91 BPM | WEIGHT: 156.5 LBS

## 2023-08-22 DIAGNOSIS — N20.1 URETEROLITHIASIS: Primary | ICD-10-CM

## 2023-08-22 DIAGNOSIS — K57.92 DIVERTICULITIS: ICD-10-CM

## 2023-08-22 LAB
ALBUMIN SERPL-MCNC: 4.2 G/DL (ref 3.5–5)
ALBUMIN/GLOB SERPL: 1.2 RATIO (ref 1.1–2)
ALP SERPL-CCNC: 68 UNIT/L (ref 40–150)
ALT SERPL-CCNC: 18 UNIT/L (ref 0–55)
APPEARANCE UR: ABNORMAL
AST SERPL-CCNC: 16 UNIT/L (ref 5–34)
BACTERIA #/AREA URNS AUTO: ABNORMAL /HPF
BASOPHILS # BLD AUTO: 0.05 X10(3)/MCL
BASOPHILS NFR BLD AUTO: 0.7 %
BILIRUB SERPL-MCNC: 0.7 MG/DL
BILIRUB UR QL STRIP.AUTO: NEGATIVE
BUN SERPL-MCNC: 12.3 MG/DL (ref 9.8–20.1)
CALCIUM SERPL-MCNC: 10.2 MG/DL (ref 8.4–10.2)
CHLORIDE SERPL-SCNC: 105 MMOL/L (ref 98–107)
CO2 SERPL-SCNC: 27 MMOL/L (ref 22–29)
COLOR UR: ABNORMAL
CREAT SERPL-MCNC: 0.69 MG/DL (ref 0.55–1.02)
EOSINOPHIL # BLD AUTO: 0.17 X10(3)/MCL (ref 0–0.9)
EOSINOPHIL NFR BLD AUTO: 2.3 %
ERYTHROCYTE [DISTWIDTH] IN BLOOD BY AUTOMATED COUNT: 12.5 % (ref 11.5–17)
GFR SERPLBLD CREATININE-BSD FMLA CKD-EPI: >60 MLS/MIN/1.73/M2
GLOBULIN SER-MCNC: 3.4 GM/DL (ref 2.4–3.5)
GLUCOSE SERPL-MCNC: 124 MG/DL (ref 74–100)
GLUCOSE UR QL STRIP.AUTO: NORMAL
HCT VFR BLD AUTO: 43.1 % (ref 37–47)
HGB BLD-MCNC: 13.6 G/DL (ref 12–16)
IMM GRANULOCYTES # BLD AUTO: 0.02 X10(3)/MCL (ref 0–0.04)
IMM GRANULOCYTES NFR BLD AUTO: 0.3 %
KETONES UR QL STRIP.AUTO: NEGATIVE
LEUKOCYTE ESTERASE UR QL STRIP.AUTO: NEGATIVE
LYMPHOCYTES # BLD AUTO: 2.74 X10(3)/MCL (ref 0.6–4.6)
LYMPHOCYTES NFR BLD AUTO: 36.3 %
MCH RBC QN AUTO: 26 PG (ref 27–31)
MCHC RBC AUTO-ENTMCNC: 31.6 G/DL (ref 33–36)
MCV RBC AUTO: 82.4 FL (ref 80–94)
MONOCYTES # BLD AUTO: 0.62 X10(3)/MCL (ref 0.1–1.3)
MONOCYTES NFR BLD AUTO: 8.2 %
NEUTROPHILS # BLD AUTO: 3.95 X10(3)/MCL (ref 2.1–9.2)
NEUTROPHILS NFR BLD AUTO: 52.2 %
NITRITE UR QL STRIP.AUTO: ABNORMAL
NRBC BLD AUTO-RTO: 0 %
PH UR STRIP.AUTO: 5.5 [PH]
PLATELET # BLD AUTO: 324 X10(3)/MCL (ref 130–400)
PMV BLD AUTO: 8.9 FL (ref 7.4–10.4)
POTASSIUM SERPL-SCNC: 4.4 MMOL/L (ref 3.5–5.1)
PROT SERPL-MCNC: 7.6 GM/DL (ref 6.4–8.3)
PROT UR QL STRIP.AUTO: ABNORMAL
RBC # BLD AUTO: 5.23 X10(6)/MCL (ref 4.2–5.4)
RBC #/AREA URNS AUTO: ABNORMAL /HPF
RBC UR QL AUTO: ABNORMAL
SODIUM SERPL-SCNC: 140 MMOL/L (ref 136–145)
SP GR UR STRIP.AUTO: 1.02
SQUAMOUS #/AREA URNS LPF: ABNORMAL /HPF
UROBILINOGEN UR STRIP-ACNC: ABNORMAL
WBC # SPEC AUTO: 7.55 X10(3)/MCL (ref 4.5–11.5)
WBC #/AREA URNS AUTO: ABNORMAL /HPF

## 2023-08-22 PROCEDURE — 81001 URINALYSIS AUTO W/SCOPE: CPT | Performed by: PHYSICIAN ASSISTANT

## 2023-08-22 PROCEDURE — 96361 HYDRATE IV INFUSION ADD-ON: CPT

## 2023-08-22 PROCEDURE — 96374 THER/PROPH/DIAG INJ IV PUSH: CPT

## 2023-08-22 PROCEDURE — 25000003 PHARM REV CODE 250: Performed by: PHYSICIAN ASSISTANT

## 2023-08-22 PROCEDURE — 80053 COMPREHEN METABOLIC PANEL: CPT | Performed by: PHYSICIAN ASSISTANT

## 2023-08-22 PROCEDURE — 99285 EMERGENCY DEPT VISIT HI MDM: CPT | Mod: 25

## 2023-08-22 PROCEDURE — 96375 TX/PRO/DX INJ NEW DRUG ADDON: CPT

## 2023-08-22 PROCEDURE — 63600175 PHARM REV CODE 636 W HCPCS: Performed by: PHYSICIAN ASSISTANT

## 2023-08-22 PROCEDURE — 85025 COMPLETE CBC W/AUTO DIFF WBC: CPT | Performed by: PHYSICIAN ASSISTANT

## 2023-08-22 RX ORDER — TAMSULOSIN HYDROCHLORIDE 0.4 MG/1
0.4 CAPSULE ORAL DAILY
Qty: 10 CAPSULE | Refills: 0 | Status: SHIPPED | OUTPATIENT
Start: 2023-08-22 | End: 2023-09-01

## 2023-08-22 RX ORDER — HYDROCODONE BITARTRATE AND ACETAMINOPHEN 5; 325 MG/1; MG/1
1 TABLET ORAL EVERY 6 HOURS PRN
Qty: 12 TABLET | Refills: 0 | Status: SHIPPED | OUTPATIENT
Start: 2023-08-22

## 2023-08-22 RX ORDER — METRONIDAZOLE 500 MG/1
500 TABLET ORAL 3 TIMES DAILY
Qty: 30 TABLET | Refills: 0 | Status: SHIPPED | OUTPATIENT
Start: 2023-08-22 | End: 2023-09-01

## 2023-08-22 RX ORDER — KETOROLAC TROMETHAMINE 30 MG/ML
30 INJECTION, SOLUTION INTRAMUSCULAR; INTRAVENOUS
Status: COMPLETED | OUTPATIENT
Start: 2023-08-22 | End: 2023-08-22

## 2023-08-22 RX ORDER — KETOROLAC TROMETHAMINE 10 MG/1
10 TABLET, FILM COATED ORAL EVERY 6 HOURS PRN
Qty: 20 TABLET | Refills: 0 | Status: SHIPPED | OUTPATIENT
Start: 2023-08-22 | End: 2023-08-27

## 2023-08-22 RX ORDER — ONDANSETRON 4 MG/1
4 TABLET, ORALLY DISINTEGRATING ORAL EVERY 8 HOURS PRN
Qty: 20 TABLET | Refills: 0 | Status: SHIPPED | OUTPATIENT
Start: 2023-08-22 | End: 2024-01-17

## 2023-08-22 RX ORDER — CIPROFLOXACIN 500 MG/1
500 TABLET ORAL EVERY 12 HOURS
Qty: 20 TABLET | Refills: 0 | Status: SHIPPED | OUTPATIENT
Start: 2023-08-22 | End: 2023-09-01

## 2023-08-22 RX ORDER — ONDANSETRON 2 MG/ML
4 INJECTION INTRAMUSCULAR; INTRAVENOUS
Status: COMPLETED | OUTPATIENT
Start: 2023-08-22 | End: 2023-08-22

## 2023-08-22 RX ADMIN — SODIUM CHLORIDE 1000 ML: 9 INJECTION, SOLUTION INTRAVENOUS at 07:08

## 2023-08-22 RX ADMIN — ONDANSETRON 4 MG: 2 INJECTION INTRAMUSCULAR; INTRAVENOUS at 07:08

## 2023-08-22 RX ADMIN — KETOROLAC TROMETHAMINE 30 MG: 30 INJECTION INTRAMUSCULAR; INTRAVENOUS at 07:08

## 2023-08-22 NOTE — ED PROVIDER NOTES
Encounter Date: 8/22/2023       History     Chief Complaint   Patient presents with    Flank Pain     Right flank pain since 12 a.m. today. Also states constipation. VssJohn Foote is a 57 y.o. female with a history of diverticulosis and nephrolithiasis who presents to the ED complaining of right flank pain, LLQ abdominal pain, nausea, and constipation x 3 days. Reports symptoms worsened around 12 am and pain has been uncontrollable since. Feels similar to when she passed kidney stones in the past and required stents. She also reports having diverticulitis at the same time and it felt similar. Has been tasking azo without relief. LBM was yesterday evening and was small and hard. She denies fevers, chills, chest pain, sob, vomiting, dysuria, hematuria.    The history is provided by the patient.     Review of patient's allergies indicates:   Allergen Reactions    Rifampin Hives    Rifamycin analogues Rash     Past Medical History:   Diagnosis Date    Diverticular disease of large intestine without perforation or abscess     Kidney stone     Personal history of colonic polyps 03/28/2023    Dr Long     Past Surgical History:   Procedure Laterality Date    BACK SURGERY      COLONOSCOPY  03/28/2023    Timmy Epps MD    CYSTOSCOPY W/ URETERAL STENT PLACEMENT N/A 10/14/2022    Procedure: CYSTOSCOPY, WITH URETERAL STENT INSERTION;  Surgeon: Anderson Nova MD;  [facility];  Service: Urology;  Laterality: N/A;    HYSTERECTOMY      SINUS SURGERY       Family History   Problem Relation Age of Onset    Heart disease Mother     Hypertension Mother     Heart disease Father     Brain cancer Father      Social History     Tobacco Use    Smoking status: Never    Smokeless tobacco: Never   Substance Use Topics    Alcohol use: Yes     Alcohol/week: 2.0 standard drinks of alcohol     Types: 1 Glasses of wine, 1 Cans of beer per week     Comment: social drink    Drug use: Never     Review of Systems    Constitutional:  Negative for chills and fever.   HENT:  Negative for congestion and sore throat.    Eyes:  Negative for redness and itching.   Respiratory:  Negative for cough and shortness of breath.    Cardiovascular:  Negative for chest pain.   Gastrointestinal:  Positive for abdominal pain, constipation and nausea. Negative for vomiting.   Genitourinary:  Positive for decreased urine volume, flank pain and urgency. Negative for dysuria and hematuria.   Musculoskeletal:  Negative for arthralgias and back pain.   Skin:  Negative for rash and wound.   Neurological:  Negative for dizziness and weakness.   Hematological:  Does not bruise/bleed easily.       Physical Exam     Initial Vitals [08/22/23 0635]   BP Pulse Resp Temp SpO2   (!) 170/92 91 18 97.7 °F (36.5 °C) 99 %      MAP       --         Physical Exam    Nursing note and vitals reviewed.  Constitutional: She appears well-developed and well-nourished. No distress.   Uncomfortable appearing   HENT:   Head: Normocephalic and atraumatic.   Mouth/Throat: No oropharyngeal exudate.   Eyes: EOM are normal. No scleral icterus.   Neck: Neck supple.   Normal range of motion.  Cardiovascular:  Normal rate and regular rhythm.           No murmur heard.  Pulmonary/Chest: Breath sounds normal. No respiratory distress. She has no wheezes.   Abdominal: Abdomen is soft. Bowel sounds are normal. She exhibits no distension. There is no abdominal tenderness.   There is right CVA tenderness.  No left CVA tenderness. There is no rebound and no guarding.   Musculoskeletal:         General: No tenderness. Normal range of motion.      Cervical back: Normal range of motion and neck supple.     Neurological: She is alert and oriented to person, place, and time. No cranial nerve deficit.   Skin: Skin is warm and dry. Capillary refill takes less than 2 seconds. No erythema.   Psychiatric: She has a normal mood and affect. Her behavior is normal. Judgment and thought content normal.          ED Course   Procedures  Labs Reviewed   COMPREHENSIVE METABOLIC PANEL - Abnormal; Notable for the following components:       Result Value    Glucose Level 124 (*)     All other components within normal limits   URINALYSIS, REFLEX TO URINE CULTURE - Abnormal; Notable for the following components:    Color, UA Dark-Orange (*)     Appearance, UA Turbid (*)     Protein, UA 1+ (*)     Blood, UA 1+ (*)     Urobilinogen, UA 2+ (*)     Nitrites, UA 1+ (*)     WBC, UA 6-10 (*)     RBC, UA 11-20 (*)     All other components within normal limits    Narrative:     Highly pigmented specimen, results may be equivocal    CBC WITH DIFFERENTIAL - Abnormal; Notable for the following components:    MCH 26.0 (*)     MCHC 31.6 (*)     All other components within normal limits   CBC W/ AUTO DIFFERENTIAL    Narrative:     The following orders were created for panel order CBC auto differential.  Procedure                               Abnormality         Status                     ---------                               -----------         ------                     CBC with Differential[[id number]]        Abnormal            Final result                 Please view results for these tests on the individual orders.   EXTRA TUBES    Narrative:     The following orders were created for panel order EXTRA TUBES.  Procedure                               Abnormality         Status                     ---------                               -----------         ------                     Light Blue Top Hold[[id number]]                              In process                 Gold Top Hold[[id number]]                                    In process                 Pink Top Hold[[id number]]                                    In process                   Please view results for these tests on the individual orders.   LIGHT BLUE TOP HOLD   GOLD TOP HOLD   PINK TOP HOLD          Imaging Results              CT Abdomen Pelvis  Without Contrast (Final  result)  Result time 08/22/23 09:54:26      Final result by Clarissa Peguero MD (08/22/23 09:54:26)                   Impression:      1. Distal right ureteral stone with moderate hydroureteronephrosis  2. Bilateral nephrolithiasis  3. Questionable subtle inflammatory change at a sigmoid diverticulum.  4. Probable cholelithiasis.      Electronically signed by: Clarissa Peguero  Date:    08/22/2023  Time:    09:54               Narrative:    EXAMINATION:  CT ABDOMEN PELVIS WITHOUT CONTRAST    CLINICAL HISTORY:  Flank pain, kidney stone suspected;    TECHNIQUE:  Helically acquired axial images, sagittal and coronal reformations were obtained from the lung bases to the pubic symphysis without the IV administration of contrast.    Automated tube current modulation, weight-based exposure dosing, and/or iterative reconstruction technique utilized to reach lowest reasonably achievable exposure rate.    DLP: 377 mGy*cm    COMPARISON:  02/15/2023 CT abdomen pelvis    FINDINGS:  HEART: Normal in size. No pericardial effusion.    LUNG BASES: Well aerated.    LIVER: Normal attenuation. No appreciable focal hepatic lesion.    BILIARY: Probable hypodense gallstone layering dependently within the gallbladder lumen..    PANCREAS: No inflammatory change.    SPLEEN: Normal in size    ADRENALS: No mass.    KIDNEYS/URETERS: There is a 5 mm calculus at the distal right ureter just below the pelvic brim.  There is moderate right hydroureteronephrosis.  There are multiple 3-5 mm right renal caliceal calculi.  There are multiple 2-5 mm left renal caliceal calculi.  No left ureteral stone.  No left hydronephrosis.  Trace right perinephric stranding.    GI TRACT/MESENTERY:  No evidence of bowel obstruction or inflammation. The appendix is normal. Colonic diverticulosis.  There is suggestion of very mild wall thickening at a diverticulum of the sigmoid colon.    PERITONEUM: No free fluid.No free air.    LYMPH NODES: No enlarged lymph  nodes by size criteria.    VASCULATURE: Mild aortic atherosclerosis.    BLADDER: Nondistended bladder limits CT evaluation    REPRODUCTIVE ORGANS: Uterus is surgically absent.    ABDOMINAL WALL: Unremarkable.    BONES: There are postsurgical changes of anterior interbody fusion at L4-L5 and L5-S1..                                       Medications   ketorolac injection 30 mg (30 mg Intravenous Given 8/22/23 0728)   ondansetron injection 4 mg (4 mg Intravenous Given 8/22/23 0728)   sodium chloride 0.9% bolus 1,000 mL 1,000 mL (0 mLs Intravenous Stopped 8/22/23 0828)     Medical Decision Making  Amount and/or Complexity of Data Reviewed  Labs: ordered. Decision-making details documented in ED Course.  Radiology: ordered. Decision-making details documented in ED Course.    Risk  Prescription drug management.               ED Course as of 08/22/23 1031   Tue Aug 22, 2023   0808 RBC, UA(!): 11-20 [KD]   0809 WBC, UA(!): 6-10 [KD]   0809 NITRITE UA(!): 1+ [KD]   0809 Creatinine: 0.69 [KD]   0809 WBC: 7.55 [KD]   1008 CT Abdomen Pelvis  Without Contrast     1. Distal right ureteral stone with moderate hydroureteronephrosis  2. Bilateral nephrolithiasis  3. Questionable subtle inflammatory change at a sigmoid diverticulum.  4. Probable cholelithiasis. [KD]   1026 Pt re-evaluated at this time. Reports feeling much better. Discussed results and treatment plan with her. She is medically stable for discharge home with cipro/flagyl for diverticulitis and flomax for ureterolithiasis. She has follow up appointment with urology in October, but I recommended she call them and let them know she has a stone in her right ureter and try to see them soon. ED return precautions given. She verbalized understanding. All questions answered.  [KD]      ED Course User Index  [KD] Alana Davison, PAOskarC               Medical Decision Making:   Initial Assessment:   Uncomfortable appearing. NAD. HDS and afebrile.+ right CVA tenderness.    Differential Diagnosis:   Nephrolithiasis, acute cystitis, pyelonephritis, diverticulitis, constipation, among others.  Clinical Tests:   Lab Tests: Ordered and Reviewed  Radiological Study: Ordered and Reviewed  ED Management:  CBC without leukocytosis. Cr stable at baseline. UA with few WBC, + nitrites. CT abdomen/pelvis with right ureteral stone, non-obstructing. CT also revealed inflammation of sigmoid diverticulum. Will treat with cipro + flagyl to cover abdominal and urinary source. Pt also to take flomax and call urology for clser follow up. Educated on straining urine. ED return precautions given for any new or worsening symptoms. She verbalized understanding. All questions answered.       Clinical Impression:   Final diagnoses:  [N20.1] Ureterolithiasis (Primary)  [K57.92] Diverticulitis        ED Disposition Condition    Discharge Stable          ED Prescriptions       Medication Sig Dispense Start Date End Date Auth. Provider    tamsulosin (FLOMAX) 0.4 mg Cap Take 1 capsule (0.4 mg total) by mouth once daily. for 10 days 10 capsule 8/22/2023 9/1/2023 Alana Davison PA-C    ciprofloxacin HCl (CIPRO) 500 MG tablet Take 1 tablet (500 mg total) by mouth every 12 (twelve) hours. for 10 days 20 tablet 8/22/2023 9/1/2023 Alana Davison PA-C    metroNIDAZOLE (FLAGYL) 500 MG tablet Take 1 tablet (500 mg total) by mouth 3 (three) times daily. for 10 days 30 tablet 8/22/2023 9/1/2023 Alana Davison PA-C    ondansetron (ZOFRAN-ODT) 4 MG TbDL Take 1 tablet (4 mg total) by mouth every 8 (eight) hours as needed (nausea/vomiting). 20 tablet 8/22/2023 -- Alana Davison PA-C    ketorolac (TORADOL) 10 mg tablet Take 1 tablet (10 mg total) by mouth every 6 (six) hours as needed for Pain. 20 tablet 8/22/2023 8/27/2023 Alana Davison PA-C    HYDROcodone-acetaminophen (NORCO) 5-325 mg per tablet Take 1 tablet by mouth every 6 (six) hours as needed for Pain. 12 tablet 8/22/2023 -- Laney,  Alana BOB PA-C          Follow-up Information       Follow up With Specialties Details Why Contact Info    [facility] Emergency Medicine  If symptoms worsen [address] [facility] [address] [phone]    Jeffry Martinez, FNP Family Medicine In 3 days Hospital follow up [address] [facility] [id number] [phone]      [facility] Urology Schedule an appointment as soon as possible for a visit   [address] [facility] [address] [phone]             Alana Davison PA-C  08/22/23 1038

## 2024-01-17 ENCOUNTER — OFFICE VISIT (OUTPATIENT)
Dept: GYNECOLOGY | Facility: CLINIC | Age: 59
End: 2024-01-17

## 2024-01-17 VITALS
OXYGEN SATURATION: 98 % | HEART RATE: 92 BPM | DIASTOLIC BLOOD PRESSURE: 83 MMHG | BODY MASS INDEX: 31.02 KG/M2 | TEMPERATURE: 98 F | SYSTOLIC BLOOD PRESSURE: 155 MMHG | WEIGHT: 169.63 LBS

## 2024-01-17 DIAGNOSIS — Z01.419 ENCOUNTER FOR ANNUAL ROUTINE GYNECOLOGICAL EXAMINATION: Primary | ICD-10-CM

## 2024-01-17 DIAGNOSIS — Z12.31 SCREENING MAMMOGRAM FOR BREAST CANCER: ICD-10-CM

## 2024-01-17 DIAGNOSIS — F41.9 ANXIETY: ICD-10-CM

## 2024-01-17 DIAGNOSIS — R23.2 HOT FLASHES: ICD-10-CM

## 2024-01-17 PROCEDURE — 99386 PREV VISIT NEW AGE 40-64: CPT | Mod: S$PBB,,, | Performed by: NURSE PRACTITIONER

## 2024-01-17 RX ORDER — ESCITALOPRAM OXALATE 10 MG/1
10 TABLET ORAL DAILY
Qty: 30 TABLET | Refills: 6 | Status: SHIPPED | OUTPATIENT
Start: 2024-01-17

## 2024-01-17 NOTE — NURSING
Pt was see today per Dr. Neff. Pt first BP check was 190/108 (Automatic). Second check was 155/83 (manual), pt was notified to follow up with pcp regarding her BP. Pt confirmed understanding regarding information with BP.

## 2024-01-17 NOTE — PROGRESS NOTES
[facility] -  Gynecology / Women's Health Clinic    Subjective:       Patient ID: Michelle Foote is a 58 y.o. female.    Chief Complaint:  Gynecologic Exam    History of Present Illness  The patient  here for annual exam. Pt had total hysterectomy @ 38 for AUB. Admits history of abnormal pap with no treatment years ago.  MG-23 & BIRADS 1. Denies breast or urinary complaints. Pt is followed by urology for kidney stones. Denies pelvic pain, abnormal bleeding or discharge. Pt c/o hot flashes, mood swings, anxiety (in which she feels chest pain at times) and depression, feels that hormones are out of control. Use of HRT (Premarin pills, patches caused skin irritation) for hot flashes following hysterectomy, stopped d/t developing breast nodules in the past. 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 Pt reports no STIs in the past and no concerns.  Denies tobacco use. Dep. screening 1. Denies fly hx of ovarian, uterine or colon cancer. Breast cancer in MGM and PGM and MA. BP-155/83. Pt needs new PCP.    GYN & OB History  No LMP recorded. Patient has had a hysterectomy.     Review of patient's allergies indicates:   Allergen Reactions    Rifampin Hives    Rifamycin analogues Rash     Past Medical History:   Diagnosis Date    Diverticular disease of large intestine without perforation or abscess     Kidney stone     Personal history of colonic polyps 2023    Dr Long     OB History    Para Term  AB Living   4 3           SAB IAB Ectopic Multiple Live Births                  # Outcome Date GA Lbr Farhat/2nd Weight Sex Delivery Anes PTL Lv   4             3 Para            2 Para            1 Para                 Review of Systems  Review of Systems    Negative except for pertinent findings for positives per HPI     Objective:    Physical Exam    BP (!) 155/83 (BP Location: Right arm, Patient Position:  Sitting, BP Method: Medium (Manual))   Pulse 92   Temp 98.3 °F (36.8 °C) (Oral)   Wt 76.9 kg (169 lb 9.6 oz)   SpO2 98%   BMI 31.02 kg/m²   GENERAL: Well-developed female in no acute distress.  SKIN: Normal to inspection,warm, dry and intact.  BREASTS: No rashes or erythema. No masses, lumps, discharge, tenderness.  VULVA: General appearance WNL; external genitalia with no lesions or erythema.  BIMANUAL EXAM: Vaginal mucosa/vault atrophic, Vaginal cuff intact. Uterus/Cervix surgically absent. Ovaries surgically absent. Benitez adnexa limited exam d/t pt comfort.  PSYCHIATRIC: Patient is oriented to person, place, and time. Mood and affect are normal.    Assessment:       1. Encounter for annual routine gynecological examination    2. Hot flashes    3. Anxiety    4. Screening mammogram for breast cancer       Plan:   Michelle was seen today for gynecologic exam.    Diagnoses and all orders for this visit:    Encounter for annual routine gynecological examination    Hot flashes  -     EScitalopram oxalate (LEXAPRO) 10 MG tablet; Take 1 tablet (10 mg total) by mouth once daily. Do not abruptly stop taking this medication.    Anxiety  -     Ambulatory referral/consult to Behavioral Health; Future  -     EScitalopram oxalate (LEXAPRO) 10 MG tablet; Take 1 tablet (10 mg total) by mouth once daily. Do not abruptly stop taking this medication.    Screening mammogram for breast cancer  -     Mammo Digital Screening Bilat w/ [facility]; Future    Pelvic today, pap deferred d/t hysterectomy  MG ordered    Depression screen is 1, pt states she has been feeling anxious and mood swings along with hot flashes. Will start Lexapro 10 mg daily.     Encouraged well balanced diet and exercise 3-4x per week; use of handheld and fan at bedside, keep home cooled. Ice packs. Encouraged wearing layers to remove as needed, light colored clothes and linen fabrics. Avoid spicy foods and excessive caffeine use. Maintain healthy body weight. Quit  smoking.  Exercise, yoga, meditation, paced respirations are all good coping techniques.   Discussed medication options including OTC regimens (Estroven, Amberen, black cohosh), HRT, SSRI/SNRI.   Will return in 6 months to discuss hot flashes, possibly consider HRT.    Pt would like a new PCP (given phone number to call and schedule with new provider)and Behavioral Health referral placed    Follow up in about 1 year (around 1/17/2025) for annual exam.

## 2024-01-26 ENCOUNTER — HOSPITAL ENCOUNTER (OUTPATIENT)
Dept: RADIOLOGY | Facility: HOSPITAL | Age: 59
Discharge: HOME | End: 2024-01-26
Attending: NURSE PRACTITIONER

## 2024-01-26 DIAGNOSIS — Z12.31 SCREENING MAMMOGRAM FOR BREAST CANCER: ICD-10-CM

## 2024-01-26 PROCEDURE — 77067 SCR MAMMO BI INCL CAD: CPT | Mod: 26,,, | Performed by: RADIOLOGY

## 2024-01-26 PROCEDURE — 77067 SCR MAMMO BI INCL CAD: CPT | Mod: TC

## 2024-01-26 PROCEDURE — 77063 BREAST TOMOSYNTHESIS BI: CPT | Mod: 26,,, | Performed by: RADIOLOGY

## 2024-02-19 ENCOUNTER — HOSPITAL ENCOUNTER (OUTPATIENT)
Dept: RADIOLOGY | Facility: HOSPITAL | Age: 59
Discharge: HOME | End: 2024-02-19
Attending: UROLOGY

## 2024-02-19 DIAGNOSIS — N20.0 RENAL CALCULUS: ICD-10-CM

## 2024-02-19 PROCEDURE — 74018 RADEX ABDOMEN 1 VIEW: CPT | Mod: TC

## 2024-05-06 ENCOUNTER — HOSPITAL ENCOUNTER (EMERGENCY)
Facility: HOSPITAL | Age: 59
Discharge: HOME | End: 2024-05-06
Attending: EMERGENCY MEDICINE

## 2024-05-06 VITALS
SYSTOLIC BLOOD PRESSURE: 163 MMHG | DIASTOLIC BLOOD PRESSURE: 93 MMHG | TEMPERATURE: 98 F | RESPIRATION RATE: 14 BRPM | OXYGEN SATURATION: 98 % | HEIGHT: 62 IN | HEART RATE: 72 BPM | WEIGHT: 162.69 LBS | BODY MASS INDEX: 29.94 KG/M2

## 2024-05-06 DIAGNOSIS — I10 HYPERTENSION: ICD-10-CM

## 2024-05-06 DIAGNOSIS — S39.012A STRAIN OF LUMBAR REGION, INITIAL ENCOUNTER: ICD-10-CM

## 2024-05-06 DIAGNOSIS — M62.838 NECK MUSCLE SPASM: Primary | ICD-10-CM

## 2024-05-06 LAB
ALBUMIN SERPL-MCNC: 4.3 G/DL (ref 3.5–5)
ALBUMIN/GLOB SERPL: 1.3 RATIO (ref 1.1–2)
ALP SERPL-CCNC: 63 UNIT/L (ref 40–150)
ALT SERPL-CCNC: 17 UNIT/L (ref 0–55)
APPEARANCE UR: CLEAR
AST SERPL-CCNC: 16 UNIT/L (ref 5–34)
BACTERIA #/AREA URNS AUTO: ABNORMAL /HPF
BASOPHILS # BLD AUTO: 0.04 X10(3)/MCL
BASOPHILS NFR BLD AUTO: 0.6 %
BILIRUB SERPL-MCNC: 0.4 MG/DL
BILIRUB UR QL STRIP.AUTO: NEGATIVE
BNP BLD-MCNC: 15.3 PG/ML
BUN SERPL-MCNC: 15.5 MG/DL (ref 9.8–20.1)
CALCIUM SERPL-MCNC: 9.9 MG/DL (ref 8.4–10.2)
CHLORIDE SERPL-SCNC: 107 MMOL/L (ref 98–107)
CK MB SERPL-MCNC: 2 NG/ML
CK SERPL-CCNC: 80 U/L (ref 29–168)
CO2 SERPL-SCNC: 24 MMOL/L (ref 22–29)
COLOR UR AUTO: COLORLESS
CREAT SERPL-MCNC: 0.72 MG/DL (ref 0.55–1.02)
EOSINOPHIL # BLD AUTO: 0.08 X10(3)/MCL (ref 0–0.9)
EOSINOPHIL NFR BLD AUTO: 1.3 %
ERYTHROCYTE [DISTWIDTH] IN BLOOD BY AUTOMATED COUNT: 12.6 % (ref 11.5–17)
GFR SERPLBLD CREATININE-BSD FMLA CKD-EPI: >60 MLS/MIN/1.73/M2
GLOBULIN SER-MCNC: 3.3 GM/DL (ref 2.4–3.5)
GLUCOSE SERPL-MCNC: 97 MG/DL (ref 74–100)
GLUCOSE UR QL STRIP.AUTO: NORMAL
HCT VFR BLD AUTO: 43.9 % (ref 37–47)
HGB BLD-MCNC: 14.3 G/DL (ref 12–16)
HOLD SPECIMEN: NORMAL
HOLD SPECIMEN: NORMAL
HYALINE CASTS #/AREA URNS LPF: ABNORMAL /LPF
IMM GRANULOCYTES # BLD AUTO: 0.03 X10(3)/MCL (ref 0–0.04)
IMM GRANULOCYTES NFR BLD AUTO: 0.5 %
KETONES UR QL STRIP.AUTO: NEGATIVE
LEUKOCYTE ESTERASE UR QL STRIP.AUTO: NEGATIVE
LYMPHOCYTES # BLD AUTO: 2.58 X10(3)/MCL (ref 0.6–4.6)
LYMPHOCYTES NFR BLD AUTO: 40.4 %
MCH RBC QN AUTO: 26.8 PG (ref 27–31)
MCHC RBC AUTO-ENTMCNC: 32.6 G/DL (ref 33–36)
MCV RBC AUTO: 82.4 FL (ref 80–94)
MONOCYTES # BLD AUTO: 0.32 X10(3)/MCL (ref 0.1–1.3)
MONOCYTES NFR BLD AUTO: 5 %
NEUTROPHILS # BLD AUTO: 3.34 X10(3)/MCL (ref 2.1–9.2)
NEUTROPHILS NFR BLD AUTO: 52.2 %
NITRITE UR QL STRIP.AUTO: NEGATIVE
NRBC BLD AUTO-RTO: 0 %
OHS QRS DURATION: 74 MS
OHS QTC CALCULATION: 415 MS
PH UR STRIP.AUTO: 7 [PH]
PLATELET # BLD AUTO: 374 X10(3)/MCL (ref 130–400)
PMV BLD AUTO: 8.8 FL (ref 7.4–10.4)
POTASSIUM SERPL-SCNC: 4 MMOL/L (ref 3.5–5.1)
PROT SERPL-MCNC: 7.6 GM/DL (ref 6.4–8.3)
PROT UR QL STRIP.AUTO: NEGATIVE
RBC # BLD AUTO: 5.33 X10(6)/MCL (ref 4.2–5.4)
RBC #/AREA URNS AUTO: ABNORMAL /HPF
RBC UR QL AUTO: NEGATIVE
SODIUM SERPL-SCNC: 141 MMOL/L (ref 136–145)
SP GR UR STRIP.AUTO: 1.01 (ref 1–1.03)
SQUAMOUS #/AREA URNS LPF: ABNORMAL /HPF
TROPONIN I SERPL-MCNC: <0.01 NG/ML (ref 0–0.04)
UROBILINOGEN UR STRIP-ACNC: NORMAL
WBC # SPEC AUTO: 6.39 X10(3)/MCL (ref 4.5–11.5)
WBC #/AREA URNS AUTO: ABNORMAL /HPF

## 2024-05-06 PROCEDURE — 85025 COMPLETE CBC W/AUTO DIFF WBC: CPT | Performed by: NURSE PRACTITIONER

## 2024-05-06 PROCEDURE — 80053 COMPREHEN METABOLIC PANEL: CPT | Performed by: NURSE PRACTITIONER

## 2024-05-06 PROCEDURE — 93005 ELECTROCARDIOGRAM TRACING: CPT

## 2024-05-06 PROCEDURE — 83880 ASSAY OF NATRIURETIC PEPTIDE: CPT | Performed by: NURSE PRACTITIONER

## 2024-05-06 PROCEDURE — 84484 ASSAY OF TROPONIN QUANT: CPT | Performed by: NURSE PRACTITIONER

## 2024-05-06 PROCEDURE — 82550 ASSAY OF CK (CPK): CPT | Performed by: NURSE PRACTITIONER

## 2024-05-06 PROCEDURE — 81001 URINALYSIS AUTO W/SCOPE: CPT | Performed by: NURSE PRACTITIONER

## 2024-05-06 PROCEDURE — 82553 CREATINE MB FRACTION: CPT | Performed by: NURSE PRACTITIONER

## 2024-05-06 PROCEDURE — 99284 EMERGENCY DEPT VISIT MOD MDM: CPT | Mod: 25

## 2024-05-06 PROCEDURE — 25000003 PHARM REV CODE 250: Performed by: PHYSICIAN ASSISTANT

## 2024-05-06 RX ORDER — TIZANIDINE 2 MG/1
2 TABLET ORAL EVERY 8 HOURS
Qty: 15 TABLET | Refills: 0 | Status: SHIPPED | OUTPATIENT
Start: 2024-05-06 | End: 2024-05-11

## 2024-05-06 RX ORDER — KETOROLAC TROMETHAMINE 10 MG/1
10 TABLET, FILM COATED ORAL
Status: COMPLETED | OUTPATIENT
Start: 2024-05-06 | End: 2024-05-06

## 2024-05-06 RX ORDER — DICLOFENAC SODIUM 75 MG/1
75 TABLET, DELAYED RELEASE ORAL 2 TIMES DAILY
Qty: 14 TABLET | Refills: 0 | Status: SHIPPED | OUTPATIENT
Start: 2024-05-06 | End: 2024-05-13

## 2024-05-06 RX ORDER — HYDRALAZINE HYDROCHLORIDE 25 MG/1
25 TABLET, FILM COATED ORAL
Status: COMPLETED | OUTPATIENT
Start: 2024-05-06 | End: 2024-05-06

## 2024-05-06 RX ORDER — LISINOPRIL 5 MG/1
5 TABLET ORAL DAILY
Qty: 30 TABLET | Refills: 0 | Status: SHIPPED | OUTPATIENT
Start: 2024-05-06 | End: 2024-06-13

## 2024-05-06 RX ADMIN — HYDRALAZINE HYDROCHLORIDE 25 MG: 25 TABLET ORAL at 04:05

## 2024-05-06 RX ADMIN — KETOROLAC TROMETHAMINE 10 MG: 10 TABLET, FILM COATED ORAL at 05:05

## 2024-05-06 NOTE — FIRST PROVIDER EVALUATION
"Medical screening examination initiated.  I have conducted a focused provider triage encounter, findings are as follows:    Brief history of present illness:  Pt is a 58 y.o. female who presents to the [facility] ED complaining of elevated blood pressure as well as a headache and low back pain. Hx of renal stones. Pt currently hypertensive.    Vitals:    05/06/24 1507   BP: (!) 198/120   BP Location: Left arm   Patient Position: Sitting   Pulse: 86   Resp: 18   Temp: 98.3 °F (36.8 °C)   TempSrc: Oral   SpO2: 100%   Weight: 73.8 kg (162 lb 11.2 oz)   Height: 5' 2" (1.575 m)       Pertinent physical exam:      Brief workup plan:  Diagnostic evaluation    Preliminary workup initiated; this workup will be continued and followed by the physician or advanced practice provider that is assigned to the patient when roomed.  "

## 2024-05-06 NOTE — ED PROVIDER NOTES
Encounter Date: 5/6/2024       History     Chief Complaint   Patient presents with    Neck Pain    Back Pain    Headache    Hypertension     C/o hypertension. Bp 198/120. Does not take meds currently. States has left neck pain and headache. Left back pain in kidney area. States has hx of kidney stones. States pain for over one month. Needs referral for pcp     Michelle PETRA Foote is a 58 y.o. female who presents to the ED with complaints of headache, elevated blood pressure, and back pain that started 1 month(s) ago. She reports a history of htn but states she has not been on medication for 10+ years. She reports a history of kidney stones. Denies hematuria, dysuria, fever, chills, nausea, vomiting. She states she is having left sided neck pain that comes and goes and feels like a muscle spasm. She denies injury to the neck and back. Has been keeping a log of her blood pressure and it has been elevated.  She denies vision change, dizziness, chest pain, shortness of breath.      The history is provided by the patient. No  was used.     Review of patient's allergies indicates:   Allergen Reactions    Rifampin Hives    Rifamycin analogues Rash     Past Medical History:   Diagnosis Date    Diverticular disease of large intestine without perforation or abscess     Kidney stone     Personal history of colonic polyps 03/28/2023    Dr Long     Past Surgical History:   Procedure Laterality Date    BACK SURGERY      COLONOSCOPY  03/28/2023    Timmy Epps MD    CYSTOSCOPY W/ URETERAL STENT PLACEMENT N/A 10/14/2022    Procedure: CYSTOSCOPY, WITH URETERAL STENT INSERTION;  Surgeon: Anderson Nova MD;  Location: [facility];  Service: Urology;  Laterality: N/A;    HYSTERECTOMY      SINUS SURGERY       Family History   Problem Relation Name Age of Onset    Heart disease Mother      Hypertension Mother      Heart disease Father      Brain cancer Father       Social History     Tobacco Use    Smoking status: Never     Smokeless tobacco: Never   Substance Use Topics    Alcohol use: Yes     Alcohol/week: 2.0 standard drinks of alcohol     Types: 1 Glasses of wine, 1 Cans of beer per week     Comment: social drink    Drug use: Never     Review of Systems   Constitutional:  Negative for chills, fatigue and fever.   HENT:  Negative for congestion, ear pain, sinus pain and sore throat.    Eyes:  Negative for pain.   Respiratory:  Negative for cough, chest tightness and shortness of breath.    Cardiovascular:  Negative for chest pain.   Gastrointestinal:  Negative for abdominal pain, constipation, diarrhea, nausea and vomiting.   Genitourinary:  Negative for dysuria, frequency and hematuria.   Musculoskeletal:  Positive for back pain and myalgias. Negative for joint swelling.   Skin:  Negative for color change and rash.   Neurological:  Positive for headaches. Negative for dizziness, syncope, weakness and light-headedness.   Psychiatric/Behavioral:  Negative for behavioral problems and confusion.        Physical Exam     Initial Vitals [05/06/24 1507]   BP Pulse Resp Temp SpO2   (!) 198/120 86 18 98.3 °F (36.8 °C) 100 %      MAP       --         Physical Exam    Constitutional: She appears well-developed and well-nourished.   HENT:   Head: Normocephalic and atraumatic.   Nose: Nose normal.   Eyes: EOM are normal. Pupils are equal, round, and reactive to light.   Neck: Neck supple. No thyromegaly present. No JVD present.   Normal range of motion.  Cardiovascular:  Normal rate, regular rhythm, normal heart sounds and intact distal pulses.           No murmur heard.  Pulmonary/Chest: Breath sounds normal. No respiratory distress. She has no wheezes. She has no rhonchi. She has no rales. She exhibits no tenderness.   Abdominal: Abdomen is soft. Bowel sounds are normal. She exhibits no distension. There is no abdominal tenderness. There is no rebound and no guarding.   Musculoskeletal:         General: No tenderness or edema. Normal range of  motion.      Cervical back: Normal range of motion and neck supple.     Lymphadenopathy:     She has no cervical adenopathy.   Neurological: She is alert and oriented to person, place, and time.   Skin: Skin is warm and dry. Capillary refill takes less than 2 seconds.   Psychiatric: She has a normal mood and affect. Thought content normal.         ED Course   Procedures  Labs Reviewed   CBC WITH DIFFERENTIAL - Abnormal; Notable for the following components:       Result Value    MCH 26.8 (*)     MCHC 32.6 (*)     All other components within normal limits   URINALYSIS, REFLEX TO URINE CULTURE - Abnormal; Notable for the following components:    Color, UA Colorless (*)     Squamous Epithelial Cells, UA Trace (*)     All other components within normal limits   CK-MB - Normal   CK - Normal   B-TYPE NATRIURETIC PEPTIDE - Normal   TROPONIN I - Normal   COMPREHENSIVE METABOLIC PANEL   CBC W/ AUTO DIFFERENTIAL    Narrative:     The following orders were created for panel order CBC Auto Differential.  Procedure                               Abnormality         Status                     ---------                               -----------         ------                     CBC with Differential[[id number]]        Abnormal            Final result                 Please view results for these tests on the individual orders.   EXTRA TUBES    Narrative:     The following orders were created for panel order EXTRA TUBES.  Procedure                               Abnormality         Status                     ---------                               -----------         ------                     Light Blue Top Hold[[id number]]                              Final result               Red Top Hold[[id number]]                                     Final result                 Please view results for these tests on the individual orders.   LIGHT BLUE TOP HOLD   RED TOP HOLD        ECG Results              EKG 12-lead (Final result)         Collection Time Result Time QRS Duration O[facility] QTC Calculation    05/06/24 15:36:25 05/06/24 16:56:45 74 415                     Final result by Interface, Lab In [facility] (05/06/24 16:56:48)                   Narrative:    Test Reason : I10,    Vent. Rate : 074 BPM     Atrial Rate : 074 BPM     P-R Int : 156 ms          QRS Dur : 074 ms      QT Int : 374 ms       P-R-T Axes : 058 052 033 degrees     QTc Int : 415 ms    Normal sinus rhythm  Normal ECG  When compared with ECG of 02-MAY-2023 15:36,  No significant change was found  Confirmed by Dae Pan MD ([id number]) on 5/6/2024 4:56:40 PM    Referred By:             Confirmed By:Dae Pan MD                                  Imaging Results    None          Medications   hydrALAZINE tablet 25 mg (25 mg Oral Given 5/6/24 1659)   ketorolac tablet 10 mg (10 mg Oral Given 5/6/24 1751)     Medical Decision Making  DDX: hypertension, hypertensive emergency, hypertensive urgency, kidney stone, muscle strain, among others    Michelle Foote is a 58 y.o. female who presents to the ED with complaints of headache, elevated blood pressure, and back pain that started 1 month(s) ago. She reports a history of htn but states she has not been on medication for 10+ years. She reports a history of kidney stones. Denies hematuria, dysuria, fever, chills, nausea, vomiting. She states she is having left sided neck pain that comes and goes and feels like a muscle spasm. She denies injury to the neck and back. Has been keeping a log of her blood pressure and it has been elevated.  She denies vision change, dizziness, chest pain, shortness of breath. CBC, CMP, cardiac enzymes, urinalysis wnl. Hydralazine 25 mg PO given in the ED with good blood pressure response. She was given Toradol 10 mg for her headache and back pain with good results. I will start her on Lisinopril and send home with a muscle relaxer and nsaid for her symptoms. Will refer to internal medicine to establish care.  Strict return precautions given. Stable for discharge.     Risk  Prescription drug management.                                      Clinical Impression:  Final diagnoses:  [I10] Hypertension  [M62.838] Neck muscle spasm (Primary)  [S39.012A] Strain of lumbar region, initial encounter          ED Disposition Condition    Discharge Stable          ED Prescriptions       Medication Sig Dispense Start Date End Date Auth. Provider    diclofenac (VOLTAREN) 75 MG EC tablet Take 1 tablet (75 mg total) by mouth 2 (two) times daily. for 7 days 14 tablet 5/6/2024 5/13/2024 Luisana Rollins PA-C    tiZANidine (ZANAFLEX) 2 MG tablet Take 1 tablet (2 mg total) by mouth every 8 (eight) hours. for 5 days 15 tablet 5/6/2024 5/11/2024 Luisana Rollins PA-C    lisinopriL (PRINIVIL,ZESTRIL) 5 MG tablet Take 1 tablet (5 mg total) by mouth once daily. 30 tablet 5/6/2024 6/5/2024 Luisana Rollins PA-C          Follow-up Information       Follow up With Specialties Details Why Contact Info    [facility]  In 1 week  [address] [address]    [facility] Dept Emergency Medicine In 3 days As needed, If symptoms worsen [address] [address] [phone]             Luisana Rollins PA-C  05/06/24 8601

## 2024-06-13 ENCOUNTER — HOSPITAL ENCOUNTER (EMERGENCY)
Facility: HOSPITAL | Age: 59
Discharge: HOME | End: 2024-06-13
Attending: INTERNAL MEDICINE

## 2024-06-13 ENCOUNTER — TELEPHONE (OUTPATIENT)
Dept: INTERNAL MEDICINE | Facility: CLINIC | Age: 59
End: 2024-06-13

## 2024-06-13 ENCOUNTER — OFFICE VISIT (OUTPATIENT)
Dept: INTERNAL MEDICINE | Facility: CLINIC | Age: 59
End: 2024-06-13

## 2024-06-13 VITALS
TEMPERATURE: 98 F | HEART RATE: 91 BPM | OXYGEN SATURATION: 99 % | SYSTOLIC BLOOD PRESSURE: 142 MMHG | DIASTOLIC BLOOD PRESSURE: 79 MMHG | BODY MASS INDEX: 30.44 KG/M2 | RESPIRATION RATE: 14 BRPM | WEIGHT: 165.38 LBS | HEIGHT: 62 IN

## 2024-06-13 VITALS
RESPIRATION RATE: 16 BRPM | BODY MASS INDEX: 30.67 KG/M2 | TEMPERATURE: 98 F | HEART RATE: 65 BPM | SYSTOLIC BLOOD PRESSURE: 180 MMHG | HEIGHT: 62 IN | WEIGHT: 166.69 LBS | DIASTOLIC BLOOD PRESSURE: 90 MMHG

## 2024-06-13 DIAGNOSIS — Z11.3 SCREEN FOR STD (SEXUALLY TRANSMITTED DISEASE): ICD-10-CM

## 2024-06-13 DIAGNOSIS — E78.00 HYPERCHOLESTEROLEMIA: ICD-10-CM

## 2024-06-13 DIAGNOSIS — Z00.00 WELLNESS EXAMINATION: ICD-10-CM

## 2024-06-13 DIAGNOSIS — N32.81 OAB (OVERACTIVE BLADDER): ICD-10-CM

## 2024-06-13 DIAGNOSIS — R06.2 NOCTURNAL COUGH WITH WHEEZE: ICD-10-CM

## 2024-06-13 DIAGNOSIS — Z87.442 HISTORY OF RENAL CALCULI: ICD-10-CM

## 2024-06-13 DIAGNOSIS — I10 PRIMARY HYPERTENSION: ICD-10-CM

## 2024-06-13 DIAGNOSIS — R05.8 NOCTURNAL COUGH WITH WHEEZE: ICD-10-CM

## 2024-06-13 DIAGNOSIS — S61.211A LACERATION OF LEFT INDEX FINGER WITHOUT FOREIGN BODY WITHOUT DAMAGE TO NAIL, INITIAL ENCOUNTER: Primary | ICD-10-CM

## 2024-06-13 PROCEDURE — 99214 OFFICE O/P EST MOD 30 MIN: CPT | Mod: PBBFAC,25 | Performed by: NURSE PRACTITIONER

## 2024-06-13 PROCEDURE — 99284 EMERGENCY DEPT VISIT MOD MDM: CPT | Mod: 25,27

## 2024-06-13 PROCEDURE — 99396 PREV VISIT EST AGE 40-64: CPT | Mod: S$PBB,,, | Performed by: NURSE PRACTITIONER

## 2024-06-13 PROCEDURE — 12001 RPR S/N/AX/GEN/TRNK 2.5CM/<: CPT

## 2024-06-13 PROCEDURE — 25000003 PHARM REV CODE 250: Performed by: NURSE PRACTITIONER

## 2024-06-13 RX ORDER — TAMSULOSIN HYDROCHLORIDE 0.4 MG/1
0.4 CAPSULE ORAL DAILY
Qty: 30 CAPSULE | Refills: 1 | Status: SHIPPED | OUTPATIENT
Start: 2024-06-13

## 2024-06-13 RX ORDER — AMOXICILLIN AND CLAVULANATE POTASSIUM 875; 125 MG/1; MG/1
1 TABLET, FILM COATED ORAL 2 TIMES DAILY
Qty: 14 TABLET | Refills: 0 | Status: SHIPPED | OUTPATIENT
Start: 2024-06-13

## 2024-06-13 RX ORDER — OXYBUTYNIN CHLORIDE 5 MG/1
5 TABLET, EXTENDED RELEASE ORAL DAILY
Qty: 30 TABLET | Refills: 3 | Status: SHIPPED | OUTPATIENT
Start: 2024-06-13 | End: 2025-06-13

## 2024-06-13 RX ORDER — LISINOPRIL 10 MG/1
10 TABLET ORAL 2 TIMES DAILY
Qty: 60 TABLET | Refills: 2 | Status: SHIPPED | OUTPATIENT
Start: 2024-06-13 | End: 2025-06-13

## 2024-06-13 RX ORDER — LIDOCAINE HYDROCHLORIDE 10 MG/ML
3 INJECTION, SOLUTION EPIDURAL; INFILTRATION; INTRACAUDAL; PERINEURAL
Status: COMPLETED | OUTPATIENT
Start: 2024-06-13 | End: 2024-06-13

## 2024-06-13 RX ORDER — DICLOFENAC SODIUM 75 MG/1
75 TABLET, DELAYED RELEASE ORAL 2 TIMES DAILY
Qty: 14 TABLET | Refills: 0 | Status: SHIPPED | OUTPATIENT
Start: 2024-06-13 | End: 2024-06-20

## 2024-06-13 RX ADMIN — LIDOCAINE HYDROCHLORIDE 30 MG: 10 INJECTION, SOLUTION EPIDURAL; INFILTRATION; INTRACAUDAL; PERINEURAL at 06:06

## 2024-06-13 NOTE — TELEPHONE ENCOUNTER
Please call Dr. Long's office and see when he wanted pt to repeat her colonoscopy? Performed colonoscopy on 3/28/2023. thanks

## 2024-06-13 NOTE — PROGRESS NOTES
"Internal Medicine Clinic  ED[facility] Ledesma     Patient Name: Michelle Foote   : 1965  MRN:[id number]     Chief Complaint     Chief Complaint   Patient presents with    Establish Care     Hx HTN,out of B/P meds x 2 weeks        History of Present Illness     58 year old white female, presents in clinic to establish PCP. PMH HTN, HLD, Hepatic steatosis, probable cholelithiasis, renal calculi, OAB, back pain, obesity. PSH hysterectomy, sinus surgery, cystoscopy with ureteral stent , back surgery. Off BP medication for 10+ years, and seen in ER and was restarted on lisinopril 5mg daily.  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   Denies chest pain, shortness of breath, cough, fever, headache, dizziness, weakness, abdominal pain, nausea, vomiting, diarrhea, constipation, dysuria, depression, anxiety.     Of note, she had an allergic reaction to Rifampin 20 years ago, was treated for MRSA skin infection at the time.  Following [facility] GYN clinic; h/o hysterectomy  MMG 24; BIRADS 1  Colonoscopy with polypectomy; Dr. Long; 3/28/2023; repeat                     Review of Systems     Review of Systems   Constitutional: Negative.    HENT: Negative.     Eyes: Negative.    Respiratory: Negative.     Cardiovascular: Negative.    Gastrointestinal: Negative.    Endocrine: Negative.    Genitourinary: Negative.    Musculoskeletal: Negative.    Integumentary:  Negative.   Allergic/Immunologic: Negative.    Neurological: Negative.    Hematological: Negative.    Psychiatric/Behavioral: Negative.     All other systems reviewed and are negative.       Physical Examination     Visit Vitals  BP (!) 180/90 (BP Location: Left arm, Patient Position: Sitting, BP Method: Medium (Manual))   Pulse 65   Temp 97.9 °F (36.6 °C) (Oral)   Resp 16   Ht 5' 2" (1.575 m)   Wt 75.6 kg (166 lb 10.7 oz)   BMI 30.48 kg/m²        BP Readings from Last 6 Encounters:   24 (!) " 142/79   06/13/24 (!) 180/90   05/06/24 (!) 163/93   01/17/24 (!) 155/83   08/22/23 (!) 170/92   05/02/23 123/75   ]    Wt Readings from Last 6 Encounters:   06/13/24 75 kg (165 lb 5.5 oz)   06/13/24 75.6 kg (166 lb 10.7 oz)   05/06/24 73.8 kg (162 lb 11.2 oz)   01/17/24 76.9 kg (169 lb 9.6 oz)   08/22/23 71 kg (156 lb 8.4 oz)   04/28/23 73.8 kg (162 lb 11.2 oz)   ]    BMI Readings from Last 3 Encounters:   05/06/24 29.76 kg/m²   01/17/24 31.02 kg/m²   08/22/23 28.63 kg/m²         Physical Exam  Vitals and nursing note reviewed.   Constitutional:       Appearance: Normal appearance.   HENT:      Head: Normocephalic and atraumatic.      Right Ear: Tympanic membrane, ear canal and external ear normal.      Left Ear: Tympanic membrane, ear canal and external ear normal.      Nose: Nose normal.      Mouth/Throat:      Mouth: Mucous membranes are moist.      Pharynx: Oropharynx is clear.   Eyes:      Extraocular Movements: Extraocular movements intact.      Conjunctiva/sclera: Conjunctivae normal.      Pupils: Pupils are equal, round, and reactive to light.   Cardiovascular:      Rate and Rhythm: Normal rate and regular rhythm.      Pulses: Normal pulses.      Heart sounds: Normal heart sounds.   Pulmonary:      Effort: Pulmonary effort is normal.      Breath sounds: Wheezing present.   Abdominal:      General: Abdomen is flat. Bowel sounds are normal.      Palpations: Abdomen is soft.   Musculoskeletal:         General: Normal range of motion.      Cervical back: Normal range of motion and neck supple.   Skin:     General: Skin is warm and dry.      Capillary Refill: Capillary refill takes less than 2 seconds.   Neurological:      General: No focal deficit present.      Mental Status: She is alert and oriented to person, place, and time. Mental status is at baseline.   Psychiatric:         Mood and Affect: Mood normal.         Behavior: Behavior normal.         Thought Content: Thought content normal.         Judgment:  Judgment normal.          Labs / Imaging     Chemistry:  Lab Results   Component Value Date     05/06/2024     11/03/2022    K 4.0 05/06/2024    K 4.5 11/03/2022    CHLORIDE 106 11/03/2022    BUN 15.5 05/06/2024    BUN 13.0 11/03/2022    CREATININE 0.72 05/06/2024    CREATININE 0.52 11/03/2022    EGFRNORACEVR >60 05/06/2024    GLUCOSE 97 05/06/2024    GLUCOSE 98 11/03/2022    CALCIUM 9.9 05/06/2024    CALCIUM 9.5 11/03/2022    ALKPHOS 63 05/06/2024    LABPROT 7.6 05/06/2024    ALBUMIN 4.3 05/06/2024    AST 16 05/06/2024    ALT 17 05/06/2024    MG 2.10 10/19/2022    PHOS 3.6 10/18/2022        Lab Results   Component Value Date    HGBA1C 5.8 01/19/2023        Hematology:  Lab Results   Component Value Date    WBC 6.39 05/06/2024    WBC 7.5 11/03/2022    RBC 5.33 05/06/2024    HGB 14.3 05/06/2024    HGB 12.1 11/03/2022    HCT 43.9 05/06/2024    HCT 37.4 11/03/2022    MCV 82.4 05/06/2024    MCV 81.8 11/03/2022    MCH 26.8 (L) 05/06/2024    MCH 26.6 11/03/2022    MCHC 32.6 (L) 05/06/2024    MCHC 32.5 11/03/2022    RDW 12.6 05/06/2024    RDW 13.1 11/03/2022     05/06/2024    MPV 8.8 05/06/2024    MPV 7.0 (L) 11/03/2022        Lipid Panel:  Lab Results   Component Value Date    CHOL 171 10/19/2022    HDL 48 10/19/2022    .00 10/19/2022    TRIG 48 10/19/2022    TOTALCHOLEST 4 10/19/2022        Urine:  Lab Results   Component Value Date    APPEARANCEUA Clear 05/06/2024    SGUA 1.006 05/06/2024    PROTEINUA Negative 05/06/2024    KETONESUA Negative 05/06/2024    LEUKOCYTESUR Negative 05/06/2024    RBCUA 0-5 05/06/2024    WBCUA 0-5 05/06/2024    BACTERIA None Seen 05/06/2024    SQEPUA Trace (A) 05/06/2024    HYALINECASTS None Seen 05/06/2024          Assessment       ICD-10-CM ICD-9-CM   1. Hypercholesterolemia  E78.00 272.0   2. Primary hypertension  I10 401.9   3. Nocturnal cough with wheeze  R05.8 786.2    R06.2 786.07   4. History of renal calculi  Z87.442 V13.01   5. OAB (overactive bladder)   N32.81 596.51   6. Wellness examination  Z00.00 V70.0   7. Screen for STD (sexually transmitted disease)  Z11.3 V74.5   8. BMI 30.0-30.9,adult  Z68.30 V85.30        Plan     1. Hypercholesterolemia  Lab Results   Component Value Date    .00 10/19/2022    CHOL 171 10/19/2022    HDL 48 10/19/2022    TRIG 48 10/19/2022     FLP ordered. Take Omega 3 daily.   Stressed importance of dietary modifications. Follow a low cholesterol, low saturated fat diet with less that 200mg of cholesterol a day.  Avoid fried foods and high saturated fats (high saturated fats less than 7% of calories).  Add Flax Seed/Fish Oil supplements to diet. Increase dietary fiber.  Regular exercise can reduce LDL and raise HDL. Stressed importance of physical activity 5 times per week for 30 minutes per day.    - CBC Auto Differential; Future  - Comprehensive Metabolic Panel; Future  - Lipid Panel; Future  - TSH; Future  - Hemoglobin A1C; Future  - Urinalysis; Future    2. Primary hypertension  BP elevated, Increase lisinopril to 10 bid. HR stable. F/u 1 months.Med refills. DASH diet: Eat more fruits, vegetables, and low fat dairy foods.  (Less than 2 grams of sodium per day).  Maintain healthy weight with goal BMI <30.   Exercise 30 minutes per day 5 days per week.  Home medications refilled and continued.   Home BP monitoring encouraged with BP parameters given.    - CBC Auto Differential; Future  - Comprehensive Metabolic Panel; Future  - Lipid Panel; Future  - TSH; Future  - Hemoglobin A1C; Future  - Urinalysis; Future    3. Nocturnal cough with wheeze    - X-Ray Chest PA And Lateral; Future    4. History of renal calculi  Refill on flomax  Push water    5. OAB (overactive bladder)  Restart ditropan, refills sent    6. Wellness examination    - CBC Auto Differential; Future  - Comprehensive Metabolic Panel; Future  - Lipid Panel; Future  - TSH; Future  - Hemoglobin A1C; Future  - Urinalysis; Future  - Vitamin D; Future  - SYPHILIS ANTIBODY  (WITH REFLEX RPR); Future  - HIV 1/2 Ag/Ab (4th Gen); Future  - Hepatitis Panel, Acute; Future    7. Screen for STD (sexually transmitted disease)    - Urinalysis; Future  - SYPHILIS ANTIBODY (WITH REFLEX RPR); Future  - HIV 1/2 Ag/Ab (4th Gen); Future  - Hepatitis Panel, Acute; Future    8. BMI 30.0-30.9,adult  Goal BMI <30.  Exercise 5 times a week for 30 minutes per day.  Avoid soda, simple sugars, excessive rice, potatoes or bread. Limit fast foods and fried foods.  Choose complex carbs in moderation (example: green vegetables, beans, oatmeal). Eat plenty of fresh fruits and vegetables with lean meats daily.  Do not skip meals. Eat a balanced portion size.  Avoid fad diets. Consider permanent healthy life style changes.          Current Outpatient Medications   Medication Instructions    amoxicillin-clavulanate 875-125mg (AUGMENTIN) 875-125 mg per tablet 1 tablet, Oral, 2 times daily    diclofenac (VOLTAREN) 75 mg, Oral, 2 times daily    EScitalopram oxalate (LEXAPRO) 10 mg, Oral, Daily, Do not abruptly stop taking this medication.    lisinopriL 10 mg, Oral, 2 times daily    oxybutynin (DITROPAN-XL) 5 mg, Oral, Daily    tamsulosin (FLOMAX) 0.4 mg, Oral, Daily       Orders Placed This Encounter   Procedures    X-Ray Chest PA And Lateral    CBC Auto Differential    Comprehensive Metabolic Panel    Lipid Panel    TSH    Hemoglobin A1C    Urinalysis    Vitamin D    SYPHILIS ANTIBODY (WITH REFLEX RPR)    HIV 1/2 Ag/Ab (4th Gen)    Hepatitis Panel, Acute         Future Appointments   Date Time Provider Department Center   7/18/2024  2:00 PM Aishwarya Summers, BINTA [facility] GYN [place]   7/31/2024  8:40 AM Val Gillespie, GREGP [facility]        Follow up in about 4 weeks (around 7/11/2024) for lab review.    Labs thoroughly reviewed with patient. Medication refills addressed today.  RTC prn and 3-4 months, with labs 1 week prior to the apt.  COVID 19 precautions given to patient.  Patient voices  understanding of all discharge instructions.      Val Gillespie, EDITH

## 2024-06-13 NOTE — ED PROVIDER NOTES
Encounter Date: 6/13/2024       History     Chief Complaint   Patient presents with    Laceration     Left index finger laceration from cutting bushes     Pt is a 58 y.o. female who presents to the [facility] ED complaining of a laceration to her Lt index finger. Injury occurred while she was cutting bushes earlier today at her home. Denies coolness to affected digit, loss of sensation to area, fever, chest pain, or SOB. Bleeding controlled with dressing. Tetanus vaccine is up to date per pt.       Review of patient's allergies indicates:   Allergen Reactions    Rifampin Hives    Rifamycin analogues Rash     Past Medical History:   Diagnosis Date    Diverticular disease of large intestine without perforation or abscess     Hypertension     Kidney stone     Personal history of colonic polyps 03/28/2023    Dr Long     Past Surgical History:   Procedure Laterality Date    BACK SURGERY      COLONOSCOPY  03/28/2023    Timmy Epps MD    CYSTOSCOPY W/ URETERAL STENT PLACEMENT N/A 10/14/2022    Procedure: CYSTOSCOPY, WITH URETERAL STENT INSERTION;  Surgeon: Anderson Nova MD;  Location: [facility] OR;  Service: Urology;  Laterality: N/A;    HYSTERECTOMY      SINUS SURGERY       Family History   Problem Relation Name Age of Onset    Heart disease Mother      Hypertension Mother      Heart disease Father      Brain cancer Father       Social History     Tobacco Use    Smoking status: Never    Smokeless tobacco: Never   Substance Use Topics    Alcohol use: Yes     Alcohol/week: 2.0 standard drinks of alcohol     Types: 1 Glasses of wine, 1 Cans of beer per week     Comment: social drink    Drug use: Never     Review of Systems   Constitutional:  Negative for chills, diaphoresis, fatigue and fever.   HENT:  Negative for facial swelling, rhinorrhea, sinus pressure, sinus pain, sore throat and trouble swallowing.    Respiratory:  Negative for cough, chest tightness, shortness of breath and wheezing.    Cardiovascular:  Negative for  chest pain, palpitations and leg swelling.   Gastrointestinal:  Negative for abdominal pain, diarrhea, nausea and vomiting.   Genitourinary:  Negative for dysuria, flank pain, frequency, hematuria and urgency.   Musculoskeletal:  Negative for arthralgias, back pain, joint swelling and myalgias.   Skin:  Positive for wound. Negative for color change and rash.   Neurological:  Negative for dizziness, syncope, weakness and light-headedness.   Hematological:  Does not bruise/bleed easily.   All other systems reviewed and are negative.      Physical Exam     Initial Vitals [06/13/24 1802]   BP Pulse Resp Temp SpO2   (!) 142/79 91 14 97.8 °F (36.6 °C) 99 %      MAP       --         Physical Exam    Nursing note and vitals reviewed.  Constitutional: She appears well-developed and well-nourished.   HENT:   Head: Normocephalic and atraumatic.   Nose: Nose normal.   Mouth/Throat: Oropharynx is clear and moist.   Eyes: Conjunctivae and EOM are normal. Pupils are equal, round, and reactive to light.   Neck: Neck supple.   Normal range of motion.  Cardiovascular:  Normal rate, regular rhythm, normal heart sounds and intact distal pulses.           Pulmonary/Chest: Effort normal and breath sounds normal. No respiratory distress. She has no wheezes. She has no rhonchi. She has no rales. She exhibits no tenderness.   Abdominal: Abdomen is soft and flat. Bowel sounds are normal. She exhibits no distension. There is no abdominal tenderness. There is no rebound, no guarding, no tenderness at McBurney's point and negative Gallego's sign. negative psoas sign  Musculoskeletal:         General: Normal range of motion.      Left hand: Tenderness present. Normal strength. Normal sensation. There is no disruption of two-point discrimination. Normal capillary refill. Normal pulse.        Hands:       Cervical back: Normal range of motion and neck supple.     Neurological: She is alert and oriented to person, place, and time. She has normal  strength and normal reflexes.   Skin: Skin is warm and dry. Capillary refill takes less than 2 seconds.   Psychiatric: She has a normal mood and affect. Her speech is normal and behavior is normal. Judgment and thought content normal.         ED Course   Lac Repair    Date/Time: 6/13/2024 7:18 PM    Performed by: James Hernandez Jr., FNP  Authorized by: James Hernandez Jr., FNP    Consent:     Consent obtained:  Emergent situation    Consent given by:  Patient    Risks, benefits, and alternatives were discussed: yes      Risks discussed:  Infection, pain, poor cosmetic result and poor wound healing    Alternatives discussed:  No treatment  Universal protocol:     Imaging studies available: yes      Site/side marked: yes      Immediately prior to procedure, a time out was called: yes      Patient identity confirmed:  Verbally with patient, arm band and provided demographic data  Anesthesia:     Anesthesia method:  Nerve block    Block location:  2nd finger of Lt hand    Block needle gauge:  27 G    Block anesthetic:  Lidocaine 1% w/o epi    Block technique:  Digital    Block injection procedure:  Anatomic landmarks identified, introduced needle, incremental injection, anatomic landmarks palpated and negative aspiration for blood    Block outcome:  Anesthesia achieved  Laceration details:     Location:  Finger    Finger location:  L index finger    Length (cm):  1.5  Pre-procedure details:     Preparation:  Patient was prepped and draped in usual sterile fashion  Treatment:     Area cleansed with:  Povidone-iodine and saline    Amount of cleaning:  Extensive    Irrigation solution:  Sterile saline    Irrigation method:  Syringe  Skin repair:     Repair method:  Sutures    Suture size:  5-0    Suture material:  Nylon    Suture technique:  Simple interrupted    Number of sutures:  4  Approximation:     Approximation:  Close  Repair type:     Repair type:  Simple  Post-procedure details:     Dressing:  Non-adherent  dressing and splint for protection    Procedure completion:  Tolerated    Labs Reviewed - No data to display       Imaging Results              X-Ray Finger 2 or More Views Left (Final result)  Result time 06/13/24 19:04:20      Final result by Millie Jackson MD (06/13/24 19:04:20)                   Impression:      Soft tissue laceration but no fracture seen      Electronically signed by: Good Jackson  Date:    06/13/2024  Time:    19:04               Narrative:    EXAMINATION:  XR FINGER 2 OR MORE VIEWS LEFT    CLINICAL HISTORY:  Laceration of index finger;    TECHNIQUE:  Three views of the left index finger were obtained.    COMPARISON:  None    FINDINGS:  The bones and joints are in good anatomic alignment with no evidence of acute fracture or dislocation is seen.  There is a laceration along the dorsal surface of the finger near the proximal interphalangeal joint.                        Wet Read by James Hernandez Jr., FNP (06/13/24 18:59:15, [facility], Emergency Medicine)    No acute fracture noted.                                     Medications   LIDOcaine (PF) 10 mg/ml (1%) injection 30 mg (30 mg Infiltration Given by Provider 6/13/24 1845)     Medical Decision Making  Differential:  Laceration  Fracture                 ED Course as of 06/13/24 1920   Thu Jun 13, 2024 1918 Given strict ED return precautions. I have spoken with the patient and/or caregivers. I have explained the patient's condition, diagnoses and treatment plan based on the information available to me at this time. I have answered the patient's and/or caregiver's questions and addressed any concerns. The patient and/or caregivers have as good an understanding of the patient's diagnosis, condition and treatment plan as can be expected at this point. The vital signs have been stable. The patient's condition is stable and appropriate for discharge from the emergency department.      The patient will pursue  further outpatient evaluation with the primary care physician or other designated or consulting physician as outlined in the discharge instructions. The patient and/or caregivers are agreeable to this plan of care and follow-up instructions have been explained in detail. The patient and/or caregivers have received these instructions in written format and have expressed an understanding of the discharge instructions. The patient and/or caregivers are aware that any significant change in condition or worsening of symptoms should prompt an immediate return to this or the closest emergency department or a call to 911.   [JA]      ED Course User Index  [JA] Jaems Hernandez Jr., FNP                           Clinical Impression:  Final diagnoses:  [S61.211A] Laceration of left index finger without foreign body without damage to nail, initial encounter (Primary)          ED Disposition Condition    Discharge Stable          ED Prescriptions       Medication Sig Dispense Start Date End Date Auth. Provider    amoxicillin-clavulanate 875-125mg (AUGMENTIN) 875-125 mg per tablet Take 1 tablet by mouth 2 (two) times daily. 14 tablet 6/13/2024 -- James Hernandez Jr., FNP    diclofenac (VOLTAREN) 75 MG EC tablet Take 1 tablet (75 mg total) by mouth 2 (two) times daily. for 7 days 14 tablet 6/13/2024 6/20/2024 James Hernandez Jr., FNP          Follow-up Information       Follow up With Specialties Details Why Contact Info    Val Gillespie FNP Family Medicine In 3 days  [address] [facility] [address]  [phone]      [facility] Emergency Medicine In 3 days As needed, If symptoms worsen [address] [facility] [address] [phone]             James Hernandez Jr., FNP  06/13/24 1920

## 2024-06-14 NOTE — DISCHARGE INSTRUCTIONS
Return to the [facility] ED in 10-14 days for suture removal.  Take medication as prescribed.  Follow up with your primary care physician in 3-5 days for follow up evaluation.  Take pain medication as prescribed for no more than 7 days.  Return to the [facility] ED in 3 days for worsening redness, tenderness, or drainage from area.

## 2024-07-18 ENCOUNTER — OFFICE VISIT (OUTPATIENT)
Dept: GYNECOLOGY | Facility: CLINIC | Age: 59
End: 2024-07-18

## 2024-07-18 VITALS
TEMPERATURE: 99 F | OXYGEN SATURATION: 100 % | SYSTOLIC BLOOD PRESSURE: 157 MMHG | WEIGHT: 164.63 LBS | RESPIRATION RATE: 20 BRPM | HEIGHT: 62 IN | DIASTOLIC BLOOD PRESSURE: 74 MMHG | HEART RATE: 60 BPM | BODY MASS INDEX: 30.29 KG/M2

## 2024-07-18 DIAGNOSIS — R23.2 HOT FLASHES: Primary | ICD-10-CM

## 2024-07-18 PROCEDURE — 99213 OFFICE O/P EST LOW 20 MIN: CPT | Mod: S$PBB,,, | Performed by: NURSE PRACTITIONER

## 2024-07-18 PROCEDURE — 99214 OFFICE O/P EST MOD 30 MIN: CPT | Mod: PBBFAC | Performed by: NURSE PRACTITIONER

## 2024-07-18 RX ORDER — ESCITALOPRAM OXALATE 10 MG/1
20 TABLET ORAL DAILY
Qty: 60 TABLET | Refills: 12 | Status: SHIPPED | OUTPATIENT
Start: 2024-07-18

## 2024-07-18 NOTE — PROGRESS NOTES
[facility] -  Gynecology / Women's Health Clinic      Subjective:       Patient ID: Michelle Foote is a 58 y.o. female.    Chief Complaint:  Med Change Follow Up    History of Present Illness  The patient  here for annual exam. Pt had total hysterectomy @ 38 for AUB. At last visit, pt c/o hot flashes, mood swings, anxiety (in which she feels chest pain at times) and depression, feels that hormones are out of control. She was started on Lexapro 10 mg. Today, admits some improvement with anxiety, however still with hot flashes, insomnia and facial hair. Pt also c/o hip pain. Use of HRT Estrogen pills for hot flashes following hysterectomy, stopped d/t developing breast nodules. Patches caused skin irritation. 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 States anxiety has improved with recent divorce. Pt was referred to behavioral health at last visit, however refused the appt. Pt is on lisinopril BID, BP-154/74 today. Pt has f/u with PCP at end of July.     GYN & OB History  No LMP recorded. Patient has had a hysterectomy.       Review of patient's allergies indicates:   Allergen Reactions    Rifampin Hives    Rifamycin analogues Rash     Past Medical History:   Diagnosis Date    Diverticular disease of large intestine without perforation or abscess     Hypertension     Kidney stone     Personal history of colonic polyps 2023    Dr Long     OB History    Para Term  AB Living   4 3           SAB IAB Ectopic Multiple Live Births                  # Outcome Date GA Lbr Farhat/2nd Weight Sex Type Anes PTL Lv   4             3 Para            2 Para            1 Para                 Review of Systems  Review of Systems    Negative except for pertinent findings for positives per HPI     Objective:    Physical Exam    BP (!) 157/74 (BP Location: Right arm, Patient Position: Sitting, BP Method: Medium (Automatic))   Pulse 60   " Temp 98.6 °F (37 °C) (Oral)   Resp 20   Ht 5' 2" (1.575 m)   Wt 74.7 kg (164 lb 9.6 oz)   SpO2 100%   BMI 30.11 kg/m²   GENERAL: Well-developed female. No acute distress.    SKIN: Normal to inspection, warm and intact  PSYCHIATRIC: Patient is oriented to person, place, and time. Mood and affect are normal.    Assessment:         ICD-10-CM ICD-9-CM   1. Hot flashes  R23.2 782.62     Plan:   Michelle was seen today for med change follow up.    Diagnoses and all orders for this visit:    Hot flashes  -     EScitalopram oxalate (LEXAPRO) 10 MG tablet; Take 2 tablets (20 mg total) by mouth once daily. Do not abruptly stop taking this medication.    Encouraged well balanced diet and exercise 3-4x per week; use of handheld and fan at bedside, keep home cooled. Ice packs. Encouraged wearing layers to remove as needed, light colored clothes and linen fabrics. Avoid spicy foods and excessive caffeine use. Maintain healthy body weight. Quit smoking.  Exercise, yoga, meditation, paced respirations are all good coping techniques.   Discussed medication options including OTC regimens (Estroven, Amberen, black cohosh), HRT, SSRI/SNRI.      Will increase Lexapro to 20 mg daily. Pt interested in starting Estroven OTC.  Will schedule with GYN for hot flashes. Patches caused skin irritation.  Pt will complete lipid panel and f/u with PCP on 7/30 for elevated BP.  Follow up for annual exam.    "

## 2024-09-13 RX ORDER — LISINOPRIL 10 MG/1
10 TABLET ORAL 2 TIMES DAILY
Qty: 60 TABLET | Refills: 2 | Status: SHIPPED | OUTPATIENT
Start: 2024-09-13

## 2024-10-24 ENCOUNTER — HOSPITAL ENCOUNTER (EMERGENCY)
Facility: HOSPITAL | Age: 59
Discharge: HOME | End: 2024-10-24
Attending: EMERGENCY MEDICINE

## 2024-10-24 VITALS
HEART RATE: 77 BPM | DIASTOLIC BLOOD PRESSURE: 87 MMHG | WEIGHT: 173.75 LBS | SYSTOLIC BLOOD PRESSURE: 172 MMHG | TEMPERATURE: 98 F | BODY MASS INDEX: 31.97 KG/M2 | RESPIRATION RATE: 21 BRPM | HEIGHT: 62 IN | OXYGEN SATURATION: 100 %

## 2024-10-24 DIAGNOSIS — R06.02 SHORTNESS OF BREATH: ICD-10-CM

## 2024-10-24 DIAGNOSIS — M54.6 LOWER THORACIC BACK PAIN: ICD-10-CM

## 2024-10-24 DIAGNOSIS — L03.116 CELLULITIS OF LEFT LOWER LEG: Primary | ICD-10-CM

## 2024-10-24 DIAGNOSIS — M54.9 BACK PAIN: ICD-10-CM

## 2024-10-24 DIAGNOSIS — R07.9 CHEST PAIN: ICD-10-CM

## 2024-10-24 DIAGNOSIS — S29.019A THORACIC MYOFASCIAL STRAIN, INITIAL ENCOUNTER: ICD-10-CM

## 2024-10-24 DIAGNOSIS — R52 PAIN: ICD-10-CM

## 2024-10-24 LAB
ALBUMIN SERPL-MCNC: 4 G/DL (ref 3.5–5)
ALBUMIN/GLOB SERPL: 1.2 RATIO (ref 1.1–2)
ALP SERPL-CCNC: 56 UNIT/L (ref 40–150)
ALT SERPL-CCNC: 27 UNIT/L (ref 0–55)
AMYLASE SERPL-CCNC: 67 UNIT/L (ref 25–125)
ANION GAP SERPL CALC-SCNC: 5 MEQ/L
AST SERPL-CCNC: 21 UNIT/L (ref 5–34)
BACTERIA #/AREA URNS AUTO: ABNORMAL /HPF
BASOPHILS # BLD AUTO: 0.05 X10(3)/MCL
BASOPHILS NFR BLD AUTO: 0.8 %
BILIRUB SERPL-MCNC: 0.3 MG/DL
BILIRUB UR QL STRIP.AUTO: NEGATIVE
BNP BLD-MCNC: 19.7 PG/ML
BUN SERPL-MCNC: 15.7 MG/DL (ref 9.8–20.1)
CALCIUM SERPL-MCNC: 9.9 MG/DL (ref 8.4–10.2)
CHLORIDE SERPL-SCNC: 108 MMOL/L (ref 98–107)
CLARITY UR: CLEAR
CO2 SERPL-SCNC: 28 MMOL/L (ref 22–29)
COLOR UR AUTO: YELLOW
CREAT SERPL-MCNC: 0.78 MG/DL (ref 0.55–1.02)
CREAT/UREA NIT SERPL: 20
EOSINOPHIL # BLD AUTO: 0.16 X10(3)/MCL (ref 0–0.9)
EOSINOPHIL NFR BLD AUTO: 2.4 %
ERYTHROCYTE [DISTWIDTH] IN BLOOD BY AUTOMATED COUNT: 12.9 % (ref 11.5–17)
GFR SERPLBLD CREATININE-BSD FMLA CKD-EPI: >60 ML/MIN/1.73/M2
GLOBULIN SER-MCNC: 3.3 GM/DL (ref 2.4–3.5)
GLUCOSE SERPL-MCNC: 205 MG/DL (ref 74–100)
GLUCOSE UR QL STRIP: ABNORMAL
HCT VFR BLD AUTO: 39.3 % (ref 37–47)
HGB BLD-MCNC: 12.6 G/DL (ref 12–16)
HGB UR QL STRIP: NEGATIVE
HOLD SPECIMEN: NORMAL
HOLD SPECIMEN: NORMAL
HYALINE CASTS #/AREA URNS LPF: ABNORMAL /LPF
IMM GRANULOCYTES # BLD AUTO: 0.01 X10(3)/MCL (ref 0–0.04)
IMM GRANULOCYTES NFR BLD AUTO: 0.2 %
KETONES UR QL STRIP: NEGATIVE
LEUKOCYTE ESTERASE UR QL STRIP: NEGATIVE
LIPASE SERPL-CCNC: 34 U/L
LYMPHOCYTES # BLD AUTO: 2.6 X10(3)/MCL (ref 0.6–4.6)
LYMPHOCYTES NFR BLD AUTO: 39 %
MCH RBC QN AUTO: 27.3 PG (ref 27–31)
MCHC RBC AUTO-ENTMCNC: 32.1 G/DL (ref 33–36)
MCV RBC AUTO: 85.1 FL (ref 80–94)
MONOCYTES # BLD AUTO: 0.24 X10(3)/MCL (ref 0.1–1.3)
MONOCYTES NFR BLD AUTO: 3.6 %
NEUTROPHILS # BLD AUTO: 3.6 X10(3)/MCL (ref 2.1–9.2)
NEUTROPHILS NFR BLD AUTO: 54 %
NITRITE UR QL STRIP: NEGATIVE
NRBC BLD AUTO-RTO: 0 %
OHS QRS DURATION: 78 MS
OHS QTC CALCULATION: 464 MS
PH UR STRIP: 5.5 [PH]
PLATELET # BLD AUTO: 355 X10(3)/MCL (ref 130–400)
PMV BLD AUTO: 8.7 FL (ref 7.4–10.4)
POTASSIUM SERPL-SCNC: 3.6 MMOL/L (ref 3.5–5.1)
PROT SERPL-MCNC: 7.3 GM/DL (ref 6.4–8.3)
PROT UR QL STRIP: NEGATIVE
RBC # BLD AUTO: 4.62 X10(6)/MCL (ref 4.2–5.4)
RBC #/AREA URNS AUTO: ABNORMAL /HPF
SODIUM SERPL-SCNC: 141 MMOL/L (ref 136–145)
SP GR UR STRIP.AUTO: 1.03 (ref 1–1.03)
SQUAMOUS #/AREA URNS LPF: ABNORMAL /HPF
TROPONIN I SERPL-MCNC: <0.01 NG/ML (ref 0–0.04)
UROBILINOGEN UR STRIP-ACNC: NORMAL
WBC # BLD AUTO: 6.66 X10(3)/MCL (ref 4.5–11.5)
WBC #/AREA URNS AUTO: ABNORMAL /HPF

## 2024-10-24 PROCEDURE — 83690 ASSAY OF LIPASE: CPT | Performed by: EMERGENCY MEDICINE

## 2024-10-24 PROCEDURE — 81001 URINALYSIS AUTO W/SCOPE: CPT | Performed by: EMERGENCY MEDICINE

## 2024-10-24 PROCEDURE — 82150 ASSAY OF AMYLASE: CPT | Performed by: EMERGENCY MEDICINE

## 2024-10-24 PROCEDURE — 83880 ASSAY OF NATRIURETIC PEPTIDE: CPT | Performed by: EMERGENCY MEDICINE

## 2024-10-24 PROCEDURE — 99285 EMERGENCY DEPT VISIT HI MDM: CPT | Mod: 25

## 2024-10-24 PROCEDURE — 84484 ASSAY OF TROPONIN QUANT: CPT | Performed by: EMERGENCY MEDICINE

## 2024-10-24 PROCEDURE — 80053 COMPREHEN METABOLIC PANEL: CPT | Performed by: EMERGENCY MEDICINE

## 2024-10-24 PROCEDURE — 93005 ELECTROCARDIOGRAM TRACING: CPT

## 2024-10-24 PROCEDURE — 85025 COMPLETE CBC W/AUTO DIFF WBC: CPT | Performed by: EMERGENCY MEDICINE

## 2024-10-24 RX ORDER — CYCLOBENZAPRINE HCL 10 MG
10 TABLET ORAL 3 TIMES DAILY PRN
Qty: 15 TABLET | Refills: 0 | Status: SHIPPED | OUTPATIENT
Start: 2024-10-24 | End: 2024-10-29

## 2024-10-24 RX ORDER — CLINDAMYCIN HYDROCHLORIDE 300 MG/1
300 CAPSULE ORAL EVERY 8 HOURS
Qty: 30 CAPSULE | Refills: 0 | Status: SHIPPED | OUTPATIENT
Start: 2024-10-24

## 2024-10-24 RX ORDER — MUPIROCIN 20 MG/G
OINTMENT TOPICAL 3 TIMES DAILY
Qty: 15 G | Refills: 0 | Status: SHIPPED | OUTPATIENT
Start: 2024-10-24

## 2024-10-24 RX ORDER — TRAMADOL HYDROCHLORIDE 50 MG/1
50 TABLET ORAL EVERY 6 HOURS PRN
Qty: 12 TABLET | Refills: 0 | Status: SHIPPED | OUTPATIENT
Start: 2024-10-24

## 2024-10-24 RX ORDER — IBUPROFEN 800 MG/1
800 TABLET ORAL 3 TIMES DAILY
Qty: 30 TABLET | Refills: 0 | Status: SHIPPED | OUTPATIENT
Start: 2024-10-24

## 2024-10-24 RX ORDER — FLUTICASONE PROPIONATE 50 MCG
2 SPRAY, SUSPENSION (ML) NASAL 2 TIMES DAILY PRN
Qty: 15 G | Refills: 0 | Status: SHIPPED | OUTPATIENT
Start: 2024-10-24

## 2025-01-06 ENCOUNTER — HOSPITAL ENCOUNTER (EMERGENCY)
Facility: HOSPITAL | Age: 60
Discharge: HOME | End: 2025-01-06
Attending: EMERGENCY MEDICINE

## 2025-01-06 VITALS
HEIGHT: 62 IN | WEIGHT: 165.56 LBS | HEART RATE: 92 BPM | OXYGEN SATURATION: 97 % | DIASTOLIC BLOOD PRESSURE: 96 MMHG | SYSTOLIC BLOOD PRESSURE: 173 MMHG | RESPIRATION RATE: 18 BRPM | TEMPERATURE: 99 F | BODY MASS INDEX: 30.47 KG/M2

## 2025-01-06 DIAGNOSIS — J10.1 INFLUENZA A: Primary | ICD-10-CM

## 2025-01-06 LAB
FLUAV AG UPPER RESP QL IA.RAPID: DETECTED
FLUBV AG UPPER RESP QL IA.RAPID: NOT DETECTED
RSV A 5' UTR RNA NPH QL NAA+PROBE: NOT DETECTED
SARS-COV-2 RNA RESP QL NAA+PROBE: NOT DETECTED

## 2025-01-06 PROCEDURE — 0241U COVID/RSV/FLU A&B PCR: CPT

## 2025-01-06 PROCEDURE — 25000003 PHARM REV CODE 250

## 2025-01-06 PROCEDURE — 99283 EMERGENCY DEPT VISIT LOW MDM: CPT | Mod: 25

## 2025-01-06 RX ORDER — PROMETHAZINE HYDROCHLORIDE 25 MG/1
25 TABLET ORAL EVERY 6 HOURS PRN
Qty: 15 TABLET | Refills: 0 | Status: SHIPPED | OUTPATIENT
Start: 2025-01-06 | End: 2025-01-10

## 2025-01-06 RX ORDER — ONDANSETRON 4 MG/1
4 TABLET, ORALLY DISINTEGRATING ORAL
Status: COMPLETED | OUTPATIENT
Start: 2025-01-06 | End: 2025-01-06

## 2025-01-06 RX ADMIN — ONDANSETRON 4 MG: 4 TABLET, ORALLY DISINTEGRATING ORAL at 08:01

## 2025-01-06 NOTE — ED PROVIDER NOTES
Encounter Date: 1/6/2025       History     Chief Complaint   Patient presents with    Nausea     Reports daughter and granddaughter at home have the flu    Vomiting     Started Saturday      Chills    Cough    Diarrhea     A 59 y.o. female patient with a history of HTN presents to the ED with N/V/diarrhea, cough, congestion, subjective fever. The onset is 4 days ago. Location is generalized body aches. It is characterized as malaise. Associated symptoms: occasional wheezing. It is constant. Alleviating factors: none. Aggravating factors: laying down at night. Severity is Mild. Risk factors include none. Denies abdominal pain, SOB, CP.         The history is provided by the patient.     Review of patient's allergies indicates:   Allergen Reactions    Rifampin Hives    Rifamycin analogues Rash     Past Medical History:   Diagnosis Date    Diverticular disease of large intestine without perforation or abscess     Hypertension     Kidney stone     Personal history of colonic polyps 03/28/2023    Dr Long     Past Surgical History:   Procedure Laterality Date    BACK SURGERY      COLONOSCOPY  03/28/2023    Timmy Epps MD    CYSTOSCOPY W/ URETERAL STENT PLACEMENT N/A 10/14/2022    Procedure: CYSTOSCOPY, WITH URETERAL STENT INSERTION;  Surgeon: Anderson Nova MD;  Location: [facility];  Service: Urology;  Laterality: N/A;    HYSTERECTOMY      SINUS SURGERY       Family History   Problem Relation Name Age of Onset    Heart disease Mother      Hypertension Mother      Heart disease Father      Brain cancer Father       Social History     Tobacco Use    Smoking status: Never    Smokeless tobacco: Never   Substance Use Topics    Alcohol use: Yes     Alcohol/week: 2.0 standard drinks of alcohol     Types: 1 Glasses of wine, 1 Cans of beer per week     Comment: social drink    Drug use: Never     Review of Systems   Constitutional:  Positive for chills and fever.   HENT:  Positive for congestion, postnasal drip and rhinorrhea.  Negative for sore throat, trouble swallowing and voice change.    Eyes:  Negative for visual disturbance.   Respiratory:  Positive for cough and wheezing. Negative for shortness of breath and stridor.    Cardiovascular:  Negative for chest pain.   Gastrointestinal:  Positive for diarrhea, nausea and vomiting. Negative for abdominal distention, abdominal pain, anal bleeding, blood in stool and constipation.   Genitourinary:  Negative for difficulty urinating and dysuria.   Musculoskeletal:  Negative for arthralgias.   Skin:  Negative for color change and rash.   Neurological:  Negative for weakness and headaches.   Hematological:  Does not bruise/bleed easily.   Psychiatric/Behavioral:  Negative for confusion.    All other systems reviewed and are negative.      Physical Exam     Initial Vitals [01/06/25 0803]   BP Pulse Resp Temp SpO2   (!) 193/94 92 18 98.6 °F (37 °C) 97 %      MAP       --         Physical Exam    Nursing note and vitals reviewed.  Constitutional: She appears well-developed and well-nourished.   HENT:   Head: Normocephalic and atraumatic.   Right Ear: Tympanic membrane, external ear and ear canal normal.   Left Ear: Tympanic membrane, external ear and ear canal normal.   Nose: Nose normal. No rhinorrhea or sinus tenderness. Mouth/Throat: Uvula is midline and mucous membranes are normal. Posterior oropharyngeal erythema present. No oropharyngeal exudate.   Eyes: Conjunctivae and EOM are normal.   Neck: Neck supple.   Cardiovascular:  Normal rate, regular rhythm and normal heart sounds.     Exam reveals no gallop and no friction rub.       No murmur heard.  Pulmonary/Chest: Breath sounds normal. No respiratory distress. She has no wheezes. She has no rhonchi. She has no rales.   Abdominal: Abdomen is soft. Bowel sounds are normal. She exhibits no distension and no mass. There is no abdominal tenderness. There is no rebound and no guarding.   Musculoskeletal:      Cervical back: Neck supple.      Neurological: She is alert and oriented to person, place, and time. GCS eye subscore is 4. GCS verbal subscore is 5. GCS motor subscore is 6.   Skin: Skin is warm and dry. Capillary refill takes less than 2 seconds.         ED Course   Procedures  Labs Reviewed   COVID/RSV/FLU A&B PCR - Abnormal       Result Value    Influenza A PCR Detected (*)     Influenza B PCR Not Detected      Respiratory Syncytial Virus PCR Not Detected      SARS-CoV-2 PCR Not Detected      Narrative:     The Xpert Xpress SARS-CoV-2/FLU/RSV plus is a rapid, multiplexed real-time PCR test intended for the simultaneous qualitative detection and differentiation of SARS-CoV-2, Influenza A, Influenza B, and respiratory syncytial virus (RSV) viral RNA in either nasopharyngeal swab or nasal swab specimens.                Imaging Results              X-Ray Chest PA And Lateral (Final result)  Result time 01/06/25 08:56:05      Final result by Dae Awan MD (01/06/25 08:56:05)                   Impression:      No acute cardiopulmonary abnormality.      Electronically signed by: Dae Awan MD  Date:    01/06/2025  Time:    08:56               Narrative:    EXAMINATION:  Two view chest radiograph.    CLINICAL HISTORY:  Cough;    TECHNIQUE:  Two view of the chest.    COMPARISON:  Chest radiograph 10/24/2024.    FINDINGS:  The lungs are clear without consolidation or effusion.  There is no pneumothorax.  The cardiac silhouette is normal in size.  There is no acute osseous abnormality.                                       Medications   ondansetron disintegrating tablet 4 mg (4 mg Oral Given 1/6/25 0849)     Medical Decision Making  A 59 y.o. female patient with a history of HTN presents to the ED with N/V/diarrhea, cough, congestion, subjective fever. The onset is 4 days ago. Location is generalized body aches. It is characterized as malaise. Associated symptoms: occasional wheezing. It is constant. Alleviating factors: none. Aggravating factors:  laying down at night. Severity is Mild. Risk factors include none. Denies abdominal pain, SOB, CP.     Pertinent findings: patient nausea improved after zofran, no vomiting while in ED. Patient stable for DC with promethazine for N/V and cough.     Clinical diagnosis:  Influenza A (Primary)    Patient stable for DC with ED Prescriptions     Medication Sig Dispense Start Date End Date Auth. Provider    promethazine (PHENERGAN) 25 MG tablet Take 1 tablet (25 mg total) by   mouth every 6 (six) hours as needed for Nausea. 15 tablet 1/6/2025   1/10/2025 Maria Teresa Benavidez PA         Referrals: f/u with PCP    Strict follow-up precautions given. Patient verbalizes understanding of treatment plan and ED return precautions.       Amount and/or Complexity of Data Reviewed  Labs:  Decision-making details documented in ED Course.  Radiology: ordered. Decision-making details documented in ED Course.    Risk  Prescription drug management.  Risk Details: Given strict ED return precautions. I have spoken with the patient and/or caregivers. I have explained the patient's condition, diagnoses and treatment plan based on the information available to me at this time. I have answered the patient's and/or caregiver's questions and addressed any concerns. The patient and/or caregivers have as good an understanding of the patient's diagnosis, condition and treatment plan as can be expected at this point. The vital signs have been stable. The patient's condition is stable and appropriate for discharge from the emergency department.      The patient will pursue further outpatient evaluation with the primary care physician or other designated or consulting physician as outlined in the discharge instructions. The patient and/or caregivers are agreeable to this plan of care and follow-up instructions have been explained in detail. The patient and/or caregivers have received these instructions in written format and have expressed an  understanding of the discharge instructions. The patient and/or caregivers are aware that any significant change in condition or worsening of symptoms should prompt an immediate return to this or the closest emergency department or a call to 911               Additional MDM:   Differential Diagnosis:   Other: The following diagnoses were also considered and will be evaluated: flu, covid and gastroenteritis.            ED Course as of 01/06/25 0946   Mon Jan 06, 2025   0859 X-Ray Chest PA And Lateral  No acute cardiopulmonary abnormality.   [AG]   0926 Influenza A, Molecular(!): Detected [AG]      ED Course User Index  [AG] Maria Teresa Benavidez PA                           Clinical Impression:  Final diagnoses:  [J10.1] Influenza A (Primary)          ED Disposition Condition    Discharge Stable          ED Prescriptions       Medication Sig Dispense Start Date End Date Auth. Provider    promethazine (PHENERGAN) 25 MG tablet Take 1 tablet (25 mg total) by mouth every 6 (six) hours as needed for Nausea. 15 tablet 1/6/2025 1/10/2025 Maria Teresa Benavidez PA          Follow-up Information       Follow up With Specialties Details Why Contact Info    Val Gillespie, FNP Family Medicine In 3 days  [address] [facility] [address]  [phone]      [facility] Emergency Medicine Go to  If symptoms worsen, As needed [address] [facility] [address] [phone]             Maria Teresa Benavidez PA  01/06/25 0946

## 2025-01-06 NOTE — Clinical Note
"Michelle "Michelle"Qamar was seen and treated in our emergency department on 1/6/2025.  She may return to work on 01/08/2025.       If you have any questions or concerns, please don't hesitate to call.      Herb Wilson, DO"

## 2025-02-21 ENCOUNTER — HOSPITAL ENCOUNTER (EMERGENCY)
Facility: HOSPITAL | Age: 60
Discharge: HOME | End: 2025-02-21
Attending: INTERNAL MEDICINE
Payer: MEDICAID

## 2025-02-21 VITALS
WEIGHT: 169.75 LBS | SYSTOLIC BLOOD PRESSURE: 183 MMHG | HEIGHT: 62 IN | HEART RATE: 82 BPM | OXYGEN SATURATION: 98 % | BODY MASS INDEX: 31.24 KG/M2 | RESPIRATION RATE: 17 BRPM | DIASTOLIC BLOOD PRESSURE: 85 MMHG | TEMPERATURE: 98 F

## 2025-02-21 DIAGNOSIS — H92.02 LEFT EAR PAIN: ICD-10-CM

## 2025-02-21 DIAGNOSIS — R09.81 CONGESTION OF NASAL SINUS: Primary | ICD-10-CM

## 2025-02-21 LAB
FLUAV AG UPPER RESP QL IA.RAPID: NOT DETECTED
FLUBV AG UPPER RESP QL IA.RAPID: NOT DETECTED
RSV A 5' UTR RNA NPH QL NAA+PROBE: NOT DETECTED
SARS-COV-2 RNA RESP QL NAA+PROBE: NOT DETECTED

## 2025-02-21 PROCEDURE — 0241U COVID/RSV/FLU A&B PCR: CPT

## 2025-02-21 PROCEDURE — 99284 EMERGENCY DEPT VISIT MOD MDM: CPT

## 2025-02-21 PROCEDURE — 25000003 PHARM REV CODE 250

## 2025-02-21 RX ORDER — LOSARTAN POTASSIUM 25 MG/1
50 TABLET ORAL ONCE
Status: COMPLETED | OUTPATIENT
Start: 2025-02-21 | End: 2025-02-21

## 2025-02-21 RX ORDER — LOSARTAN POTASSIUM 50 MG/1
50 TABLET ORAL DAILY
Qty: 60 TABLET | Refills: 0 | Status: SHIPPED | OUTPATIENT
Start: 2025-02-21

## 2025-02-21 RX ORDER — LOSARTAN POTASSIUM 25 MG/1
50 TABLET ORAL ONCE
Status: DISCONTINUED | OUTPATIENT
Start: 2025-02-21 | End: 2025-02-21

## 2025-02-21 RX ORDER — METHYLPREDNISOLONE 4 MG/1
TABLET ORAL
Qty: 21 TABLET | Refills: 0 | Status: SHIPPED | OUTPATIENT
Start: 2025-02-21

## 2025-02-21 RX ADMIN — LOSARTAN POTASSIUM 50 MG: 25 TABLET, FILM COATED ORAL at 03:02

## 2025-02-21 NOTE — DISCHARGE INSTRUCTIONS
Prescribed Valsartan 50mg daily, DO NOT TAKE with Lisinopril 10mg, only take Valsartan. Please reach out to PCP for continued HTN management.

## 2025-02-21 NOTE — ED PROVIDER NOTES
Encounter Date: 2/21/2025       History     Chief Complaint   Patient presents with    Headache    Nasal Congestion     Headache, congestion, ear pain, and left eye redness since yesterday. Afebrile at this time. Vss. zeynep LAMBERT [place] 59 y.o. female presents with headache, congestions, left ear pain, and left eye redness for 1 day. Ear pain is intermittent. New left eye redness on conjunctiva, with no pain or crusting. Denies trauma or injury to eye. Reports sinus headache on anterior face. Denies sick contacts. Denies fever, chills, cough, CP, SOB, abdominal pain, diarrhea, or dysuria.    Patient is hypertensive at 185/100 on ED admission. She has stopped taking her lisinopril last year due to normal BP at home and losing weight. Currently not on any anti-HTN regiment. Per chart review, last CMP on 10/2024 BUN/Cr 15.7/0.78      The history is provided by the patient. No  was used.     Review of patient's allergies indicates:   Allergen Reactions    Rifampin Hives    Rifamycin analogues Rash     Past Medical History:   Diagnosis Date    Diverticular disease of large intestine without perforation or abscess     Hypertension     Kidney stone     Personal history of colonic polyps 03/28/2023    Dr Long     Past Surgical History:   Procedure Laterality Date    BACK SURGERY      COLONOSCOPY  03/28/2023    Timmy Epps MD    CYSTOSCOPY W/ URETERAL STENT PLACEMENT N/A 10/14/2022    Procedure: CYSTOSCOPY, WITH URETERAL STENT INSERTION;  Surgeon: Anderson Nova MD;  Location: [facility];  Service: Urology;  Laterality: N/A;    HYSTERECTOMY      SINUS SURGERY       Family History   Problem Relation Name Age of Onset    Heart disease Mother      Hypertension Mother      Heart disease Father      Brain cancer Father       Social History[1]  Review of Systems   Constitutional:  Negative for fever.   HENT:  Positive for congestion.    Eyes:  Positive for redness. Negative for pain, discharge, itching  and visual disturbance.   Respiratory:  Negative for cough and shortness of breath.    Cardiovascular:  Negative for chest pain and leg swelling.   Gastrointestinal:  Negative for abdominal pain, diarrhea, nausea and vomiting.   Genitourinary:  Negative for dysuria.   Neurological:  Positive for headaches. Negative for weakness and light-headedness.       Physical Exam     Initial Vitals [02/21/25 1327]   BP Pulse Resp Temp SpO2   (!) 185/100 86 18 97.7 °F (36.5 °C) 100 %      MAP       --         Physical Exam    Constitutional: She appears well-developed and well-nourished. No distress.   HENT:   Head: Normocephalic and atraumatic.   Right Ear: External ear normal.   Left Ear: External ear normal. Mouth/Throat: Oropharynx is clear and moist. No oropharyngeal exudate.   Congested, bilateral TM normal with no perforation, positive cone of light sign bilaterally. No erythema or edema on bilateral TM, mild tenderness to palpation on maxillary and frontal sinuses bilaterally   Eyes: EOM are normal. Pupils are equal, round, and reactive to light. Right eye exhibits no discharge. Left eye exhibits no discharge.   Diffuse conjunctival hemorrhage on left eye conjunctiva   Neck: Neck supple.   Normal range of motion.  Cardiovascular:  Normal rate, regular rhythm, normal heart sounds and intact distal pulses.           No murmur heard.  Pulmonary/Chest: Breath sounds normal. No respiratory distress. She has no wheezes. She has no rales.   Abdominal: Abdomen is soft. Bowel sounds are normal. She exhibits no distension. There is no abdominal tenderness.   Musculoskeletal:         General: No tenderness or edema.      Cervical back: Normal range of motion and neck supple.     Lymphadenopathy:     She has no cervical adenopathy.   Neurological: She is alert and oriented to person, place, and time. No cranial nerve deficit. GCS score is 15. GCS eye subscore is 4. GCS verbal subscore is 5. GCS motor subscore is 6.   Bilateral  sensation and muscle tone/strength 5/5 on bilateral face  No deficits on vision exam, no changes in peripheral vision   Skin: Skin is warm and dry. Capillary refill takes less than 2 seconds. No rash noted. No erythema.         ED Course   Procedures  Labs Reviewed   COVID/RSV/FLU A&B PCR - Normal       Result Value    Influenza A PCR Not Detected      Influenza B PCR Not Detected      Respiratory Syncytial Virus PCR Not Detected      SARS-CoV-2 PCR Not Detected      Narrative:     The Xpert Xpress SARS-CoV-2/FLU/RSV plus is a rapid, multiplexed real-time PCR test intended for the simultaneous qualitative detection and differentiation of SARS-CoV-2, Influenza A, Influenza B, and respiratory syncytial virus (RSV) viral RNA in either nasopharyngeal swab or nasal swab specimens.                Imaging Results    None          Medications   losartan tablet 50 mg (50 mg Oral Given 2/21/25 1543)     Medical Decision Making  59 y.o. female presents to ED with 1 day history of congestion, left ear pain, sinus headache and left eye redness. Denies eye trauma or injury, denies sick contacts. COVID/FLU/RSV negative. Physical exam normal on bilateral TM, positive for congestion. Mild tenderness to bilateral sinuses. Likely viral etiology. Hypertensive on ED exam, at 185/100, last CMP on 10.2024 with normal BUN/Cr. Will prescribe Losartan 50mg daily and give one dose in ED. Patient counseled on importance of only taking either losartan or lisinopril, do not take both. Patient voiced understanding and will only take losartan and follow up with PCP. Medrol dose pack for congestion and recommended artificial tear eye drops for left eye redness. Patient concerns and questions addressed, stable for discharge.     Risk  Prescription drug management.      Additional MDM:   Differential Diagnosis:   Strep throat, viral URI, pink eye, tonsillitis, perforated TM, OM/OE            Attending Attestation:   Physician Attestation Statement  for Resident:  As the supervising MD   Physician Attestation Statement: I have personally seen and examined this patient.   I agree with the above history.  -:   As the supervising MD I agree with the above PE.     As the supervising MD I agree with the above treatment, course, plan, and disposition.    I have reviewed and agree with the residents interpretation of the following: lab data and x-rays.                                        Clinical Impression:  Final diagnoses:  [R09.81] Congestion of nasal sinus (Primary)  [H92.02] Left ear pain          ED Disposition Condition    Discharge Stable          ED Prescriptions       Medication Sig Dispense Start Date End Date Auth. Provider    losartan (COZAAR) 50 MG tablet Take 1 tablet (50 mg total) by mouth once daily. 60 tablet 2/21/2025 -- Franci He MD    methylPREDNISolone (MEDROL DOSEPACK) 4 mg tablet Use as directed 21 tablet 2/21/2025 -- Franci He MD          Follow-up Information       Follow up With Specialties Details Why Contact Info    Val Gillespie, GREGP Family Medicine Schedule an appointment as soon as possible for a visit in 2 weeks  [address] [facility] [address]  [phone]      [facility] Emergency Medicine Go to  As needed, If symptoms worsen [address] [facility] [address] [phone]               Franci He MD  Resident  02/21/25 1512         [1]   Social History  Tobacco Use    Smoking status: Never    Smokeless tobacco: Never   Substance Use Topics    Alcohol use: Yes     Alcohol/week: 2.0 standard drinks of alcohol     Types: 1 Glasses of wine, 1 Cans of beer per week     Comment: social drink    Drug use: Never        Fernando Carbajal MD  02/21/25 4414

## 2025-03-10 RX ORDER — TAMSULOSIN HYDROCHLORIDE 0.4 MG/1
1 CAPSULE ORAL
Qty: 30 CAPSULE | Refills: 0 | Status: SHIPPED | OUTPATIENT
Start: 2025-03-10

## 2025-03-25 DIAGNOSIS — Z12.31 VISIT FOR SCREENING MAMMOGRAM: Primary | ICD-10-CM

## 2025-06-06 ENCOUNTER — LAB VISIT (OUTPATIENT)
Dept: LAB | Facility: HOSPITAL | Age: 60
End: 2025-06-06
Attending: NURSE PRACTITIONER
Payer: MEDICAID

## 2025-06-06 DIAGNOSIS — E78.00 HYPERCHOLESTEROLEMIA: ICD-10-CM

## 2025-06-06 DIAGNOSIS — Z11.3 SCREEN FOR STD (SEXUALLY TRANSMITTED DISEASE): ICD-10-CM

## 2025-06-06 DIAGNOSIS — I10 PRIMARY HYPERTENSION: ICD-10-CM

## 2025-06-06 DIAGNOSIS — Z00.00 WELLNESS EXAMINATION: ICD-10-CM

## 2025-06-06 LAB
25(OH)D3+25(OH)D2 SERPL-MCNC: 65 NG/ML (ref 30–80)
ALBUMIN SERPL-MCNC: 3.9 G/DL (ref 3.5–5)
ALBUMIN/GLOB SERPL: 1.2 RATIO (ref 1.1–2)
ALP SERPL-CCNC: 55 UNIT/L (ref 40–150)
ALT SERPL-CCNC: 22 UNIT/L (ref 0–55)
ANION GAP SERPL CALC-SCNC: 7 MEQ/L
AST SERPL-CCNC: 19 UNIT/L (ref 11–45)
BACTERIA #/AREA URNS AUTO: ABNORMAL /HPF
BASOPHILS # BLD AUTO: 0.06 X10(3)/MCL
BASOPHILS NFR BLD AUTO: 1 %
BILIRUB SERPL-MCNC: 0.3 MG/DL
BILIRUB UR QL STRIP.AUTO: NEGATIVE
BUN SERPL-MCNC: 19.2 MG/DL (ref 9.8–20.1)
CALCIUM SERPL-MCNC: 9 MG/DL (ref 8.4–10.2)
CHLORIDE SERPL-SCNC: 109 MMOL/L (ref 98–107)
CHOLEST SERPL-MCNC: 250 MG/DL
CHOLEST/HDLC SERPL: 4 {RATIO} (ref 0–5)
CLARITY UR: CLEAR
CO2 SERPL-SCNC: 24 MMOL/L (ref 22–29)
COLOR UR AUTO: ABNORMAL
CREAT SERPL-MCNC: 0.72 MG/DL (ref 0.55–1.02)
CREAT/UREA NIT SERPL: 27
EOSINOPHIL # BLD AUTO: 0.22 X10(3)/MCL (ref 0–0.9)
EOSINOPHIL NFR BLD AUTO: 3.6 %
ERYTHROCYTE [DISTWIDTH] IN BLOOD BY AUTOMATED COUNT: 12.3 % (ref 11.5–17)
EST. AVERAGE GLUCOSE BLD GHB EST-MCNC: 125.5 MG/DL
GFR SERPLBLD CREATININE-BSD FMLA CKD-EPI: >60 ML/MIN/1.73/M2
GLOBULIN SER-MCNC: 3.3 GM/DL (ref 2.4–3.5)
GLUCOSE SERPL-MCNC: 110 MG/DL (ref 74–100)
GLUCOSE UR QL STRIP: NORMAL
HAV IGM SERPL QL IA: NONREACTIVE
HBA1C MFR BLD: 6 %
HBV CORE IGM SERPL QL IA: NONREACTIVE
HBV SURFACE AG SERPL QL IA: NONREACTIVE
HCT VFR BLD AUTO: 39.6 % (ref 37–47)
HCV AB SERPL QL IA: NONREACTIVE
HDLC SERPL-MCNC: 65 MG/DL (ref 35–60)
HGB BLD-MCNC: 12.3 G/DL (ref 12–16)
HGB UR QL STRIP: NEGATIVE
HIV 1+2 AB+HIV1 P24 AG SERPL QL IA: NONREACTIVE
HYALINE CASTS #/AREA URNS LPF: ABNORMAL /LPF
IMM GRANULOCYTES # BLD AUTO: 0.01 X10(3)/MCL (ref 0–0.04)
IMM GRANULOCYTES NFR BLD AUTO: 0.2 %
KETONES UR QL STRIP: NEGATIVE
LDLC SERPL CALC-MCNC: 166 MG/DL (ref 50–140)
LEUKOCYTE ESTERASE UR QL STRIP: NEGATIVE
LYMPHOCYTES # BLD AUTO: 2.95 X10(3)/MCL (ref 0.6–4.6)
LYMPHOCYTES NFR BLD AUTO: 48.1 %
MCH RBC QN AUTO: 26.6 PG (ref 27–31)
MCHC RBC AUTO-ENTMCNC: 31.1 G/DL (ref 33–36)
MCV RBC AUTO: 85.5 FL (ref 80–94)
MONOCYTES # BLD AUTO: 0.35 X10(3)/MCL (ref 0.1–1.3)
MONOCYTES NFR BLD AUTO: 5.7 %
MUCOUS THREADS URNS QL MICRO: ABNORMAL /LPF
NEUTROPHILS # BLD AUTO: 2.54 X10(3)/MCL (ref 2.1–9.2)
NEUTROPHILS NFR BLD AUTO: 41.4 %
NITRITE UR QL STRIP: NEGATIVE
NRBC BLD AUTO-RTO: 0 %
PH UR STRIP: 6.5 [PH]
PLATELET # BLD AUTO: 331 X10(3)/MCL (ref 130–400)
PMV BLD AUTO: 9.2 FL (ref 7.4–10.4)
POTASSIUM SERPL-SCNC: 4.4 MMOL/L (ref 3.5–5.1)
PROT SERPL-MCNC: 7.2 GM/DL (ref 6.4–8.3)
PROT UR QL STRIP: NEGATIVE
RBC # BLD AUTO: 4.63 X10(6)/MCL (ref 4.2–5.4)
RBC #/AREA URNS AUTO: ABNORMAL /HPF
SODIUM SERPL-SCNC: 140 MMOL/L (ref 136–145)
SP GR UR STRIP.AUTO: 1.02 (ref 1–1.03)
SQUAMOUS #/AREA URNS LPF: ABNORMAL /HPF
T PALLIDUM AB SER QL: NONREACTIVE
TRIGL SERPL-MCNC: 94 MG/DL (ref 37–140)
TSH SERPL-ACNC: 1.25 UIU/ML (ref 0.35–4.94)
UROBILINOGEN UR STRIP-ACNC: NORMAL
VLDLC SERPL CALC-MCNC: 19 MG/DL
WBC # BLD AUTO: 6.13 X10(3)/MCL (ref 4.5–11.5)
WBC #/AREA URNS AUTO: ABNORMAL /HPF

## 2025-06-06 PROCEDURE — 80074 ACUTE HEPATITIS PANEL: CPT

## 2025-06-06 PROCEDURE — 85025 COMPLETE CBC W/AUTO DIFF WBC: CPT

## 2025-06-06 PROCEDURE — 84443 ASSAY THYROID STIM HORMONE: CPT

## 2025-06-06 PROCEDURE — 80053 COMPREHEN METABOLIC PANEL: CPT

## 2025-06-06 PROCEDURE — 82306 VITAMIN D 25 HYDROXY: CPT

## 2025-06-06 PROCEDURE — 81015 MICROSCOPIC EXAM OF URINE: CPT

## 2025-06-06 PROCEDURE — 80061 LIPID PANEL: CPT

## 2025-06-06 PROCEDURE — 86780 TREPONEMA PALLIDUM: CPT

## 2025-06-06 PROCEDURE — 83036 HEMOGLOBIN GLYCOSYLATED A1C: CPT

## 2025-06-06 PROCEDURE — 36415 COLL VENOUS BLD VENIPUNCTURE: CPT

## 2025-06-06 PROCEDURE — 87389 HIV-1 AG W/HIV-1&-2 AB AG IA: CPT

## 2025-06-09 ENCOUNTER — OFFICE VISIT (OUTPATIENT)
Dept: INTERNAL MEDICINE | Facility: CLINIC | Age: 60
End: 2025-06-09
Payer: MEDICAID

## 2025-06-09 VITALS
HEART RATE: 72 BPM | RESPIRATION RATE: 18 BRPM | WEIGHT: 168.81 LBS | HEIGHT: 62 IN | OXYGEN SATURATION: 98 % | DIASTOLIC BLOOD PRESSURE: 72 MMHG | BODY MASS INDEX: 31.06 KG/M2 | SYSTOLIC BLOOD PRESSURE: 158 MMHG

## 2025-06-09 DIAGNOSIS — I10 PRIMARY HYPERTENSION: ICD-10-CM

## 2025-06-09 DIAGNOSIS — N32.81 OAB (OVERACTIVE BLADDER): ICD-10-CM

## 2025-06-09 DIAGNOSIS — R73.03 PREDIABETES: ICD-10-CM

## 2025-06-09 DIAGNOSIS — M25.59 PAIN IN OTHER JOINT: ICD-10-CM

## 2025-06-09 DIAGNOSIS — E78.00 HYPERCHOLESTEROLEMIA: ICD-10-CM

## 2025-06-09 PROCEDURE — 1160F RVW MEDS BY RX/DR IN RCRD: CPT | Mod: CPTII,,, | Performed by: NURSE PRACTITIONER

## 2025-06-09 PROCEDURE — 1159F MED LIST DOCD IN RCRD: CPT | Mod: CPTII,,, | Performed by: NURSE PRACTITIONER

## 2025-06-09 PROCEDURE — 3077F SYST BP >= 140 MM HG: CPT | Mod: CPTII,,, | Performed by: NURSE PRACTITIONER

## 2025-06-09 PROCEDURE — 3078F DIAST BP <80 MM HG: CPT | Mod: CPTII,,, | Performed by: NURSE PRACTITIONER

## 2025-06-09 PROCEDURE — 3008F BODY MASS INDEX DOCD: CPT | Mod: CPTII,,, | Performed by: NURSE PRACTITIONER

## 2025-06-09 PROCEDURE — 99213 OFFICE O/P EST LOW 20 MIN: CPT | Mod: PBBFAC | Performed by: NURSE PRACTITIONER

## 2025-06-09 PROCEDURE — 99214 OFFICE O/P EST MOD 30 MIN: CPT | Mod: S$PBB,,, | Performed by: NURSE PRACTITIONER

## 2025-06-09 PROCEDURE — 4010F ACE/ARB THERAPY RXD/TAKEN: CPT | Mod: CPTII,,, | Performed by: NURSE PRACTITIONER

## 2025-06-09 PROCEDURE — 3044F HG A1C LEVEL LT 7.0%: CPT | Mod: CPTII,,, | Performed by: NURSE PRACTITIONER

## 2025-06-09 RX ORDER — TRAZODONE HYDROCHLORIDE 50 MG/1
50 TABLET ORAL NIGHTLY PRN
Qty: 30 TABLET | Refills: 4 | Status: SHIPPED | OUTPATIENT
Start: 2025-06-09 | End: 2026-06-09

## 2025-06-09 RX ORDER — OXYBUTYNIN CHLORIDE 5 MG/1
5 TABLET, EXTENDED RELEASE ORAL DAILY
Qty: 90 TABLET | Refills: 1 | Status: SHIPPED | OUTPATIENT
Start: 2025-06-09 | End: 2026-06-09

## 2025-06-09 RX ORDER — MELOXICAM 7.5 MG/1
7.5 TABLET ORAL DAILY PRN
Qty: 30 TABLET | Refills: 6 | Status: SHIPPED | OUTPATIENT
Start: 2025-06-09

## 2025-06-09 RX ORDER — LOSARTAN POTASSIUM 50 MG/1
50 TABLET ORAL DAILY
Qty: 30 TABLET | Refills: 3 | Status: SHIPPED | OUTPATIENT
Start: 2025-06-09

## 2025-06-09 NOTE — PROGRESS NOTES
[facility]  EDITH Ledesma     Patient Name: Michelle Foote   : 1965  MRN:[id number]     Chief Complaint     Chief Complaint   Patient presents with    Follow-up     Pt present for f/u with lab review    Insomnia     Pt c/o having insomnia and exhaustion with average of 5 hours of sleep a night        History of Present Illness     59 year old white female, presents in clinic for lab review. LOV 2024. BP elevated, out of Losartan 50 x 2 wks. Reports fatigue, trouble sleeping, dizzy spells, multiple joint pains, body aches. 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 Denies joint swelling, heat, erythema. Sleeping only 5 hrs per night, States 2 accidental falls; a trip and fall in 2025, and in 2024 fell comes down from attic.     PMH HTN, HLD, prediabetes, Hepatic steatosis, probable cholelithiasis, renal calculi, OAB, back pain, obesity. PSH hysterectomy, sinus surgery, cystoscopy with ureteral stent , back surgery. 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   Denies chest pain, shortness of breath, cough, fever, headache, dizziness, weakness, abdominal pain, nausea, vomiting, diarrhea, constipation, dysuria, depression, anxiety.      Of note, she had an allergic reaction to Rifampin 20 years ago, was treated for MRSA skin infection at the time.  Following [facility] G[facility] clinic; h/o hysterectomy  MMG 24; BIRADS 1  Colonoscopy with polypectomy; Dr. Long; 3/28/2023; repeat                   Review of Systems     Review of Systems   Constitutional:  Positive for fatigue.   HENT: Negative.     Eyes: Negative.    Respiratory: Negative.     Cardiovascular: Negative.    Gastrointestinal: Negative.    Endocrine: Negative.    Genitourinary:  Positive for frequency.   Musculoskeletal:  Positive for arthralgias, back pain and myalgias.   Integumentary:  Negative.   Allergic/Immunologic: Negative.    Neurological:  Positive  "for dizziness.   Hematological: Negative.    Psychiatric/Behavioral:  Positive for sleep disturbance.    All other systems reviewed and are negative.       Physical Examination     Visit Vitals  BP (!) 158/72 (BP Location: Right arm, Patient Position: Sitting)   Pulse 72   Resp 18   Ht 5' 2" (1.575 m)   Wt 76.6 kg (168 lb 12.8 oz)   SpO2 98%   BMI 30.87 kg/m²        BP Readings from Last 6 Encounters:   06/09/25 (!) 158/72   02/21/25 (!) 183/85   01/06/25 (!) 173/96   10/24/24 (!) 172/87   07/18/24 (!) 157/74   06/13/24 (!) 142/79   ]    Wt Readings from Last 6 Encounters:   06/09/25 76.6 kg (168 lb 12.8 oz)   02/21/25 77 kg (169 lb 12.1 oz)   01/06/25 75.1 kg (165 lb 9.1 oz)   10/24/24 78.8 kg (173 lb 11.6 oz)   07/18/24 74.7 kg (164 lb 9.6 oz)   06/13/24 75 kg (165 lb 5.5 oz)   ]    BMI Readings from Last 3 Encounters:   06/09/25 30.87 kg/m²   02/21/25 31.05 kg/m²   01/06/25 30.28 kg/m²         Physical Exam  Vitals and nursing note reviewed.   Constitutional:       Appearance: Normal appearance.   HENT:      Head: Normocephalic and atraumatic.      Right Ear: Tympanic membrane, ear canal and external ear normal.      Left Ear: Tympanic membrane, ear canal and external ear normal.      Nose: Nose normal.      Mouth/Throat:      Mouth: Mucous membranes are moist.      Pharynx: Oropharynx is clear.   Eyes:      Extraocular Movements: Extraocular movements intact.      Conjunctiva/sclera: Conjunctivae normal.      Pupils: Pupils are equal, round, and reactive to light.   Cardiovascular:      Rate and Rhythm: Normal rate and regular rhythm.      Pulses: Normal pulses.      Heart sounds: Normal heart sounds.   Pulmonary:      Effort: Pulmonary effort is normal.      Breath sounds: Normal breath sounds.   Abdominal:      General: Abdomen is flat. Bowel sounds are normal.      Palpations: Abdomen is soft.   Musculoskeletal:         General: Normal range of motion.      Cervical back: Normal range of motion and neck " supple.   Skin:     General: Skin is warm and dry.      Capillary Refill: Capillary refill takes less than 2 seconds.   Neurological:      General: No focal deficit present.      Mental Status: She is alert and oriented to person, place, and time. Mental status is at baseline.   Psychiatric:         Mood and Affect: Mood normal.         Behavior: Behavior normal.         Thought Content: Thought content normal.         Judgment: Judgment normal.          Labs / Imaging     Chemistry:  Lab Results   Component Value Date     06/06/2025     11/03/2022    K 4.4 06/06/2025    K 4.5 11/03/2022    CHLORIDE 106 11/03/2022    BUN 19.2 06/06/2025    BUN 13.0 11/03/2022    CREATININE 0.72 06/06/2025    CREATININE 0.52 11/03/2022    EGFRNORACEVR >60 06/06/2025    GLUCOSE 98 11/03/2022    CALCIUM 9.0 06/06/2025    CALCIUM 9.5 11/03/2022    ALKPHOS 55 06/06/2025    ALBUMIN 3.9 06/06/2025    AST 19 06/06/2025    ALT 22 06/06/2025    MG 2.10 10/19/2022    PHOS 3.6 10/18/2022    [id number] 65 06/06/2025        Lab Results   Component Value Date    HGBA1C 6.0 06/06/2025        Hematology:  Lab Results   Component Value Date    WBC 6.13 06/06/2025    WBC 7.5 11/03/2022    RBC 4.63 06/06/2025    HGB 12.3 06/06/2025    HGB 12.1 11/03/2022    HCT 39.6 06/06/2025    HCT 37.4 11/03/2022    MCV 85.5 06/06/2025    MCV 81.8 11/03/2022    MCH 26.6 (L) 06/06/2025    MCH 26.6 11/03/2022    MCHC 31.1 (L) 06/06/2025    MCHC 32.5 11/03/2022    RDW 12.3 06/06/2025    RDW 13.1 11/03/2022     06/06/2025    MPV 9.2 06/06/2025    MPV 7.0 (L) 11/03/2022        Lipid Panel:  Lab Results   Component Value Date    CHOL 250 (H) 06/06/2025    HDL 65 (H) 06/06/2025    .00 (H) 06/06/2025    TRIG 94 06/06/2025    TOTALCHOLEST 4 06/06/2025        Urine:  Lab Results   Component Value Date    APPEARANCEUA Clear 06/06/2025    SGUA 1.018 06/06/2025    PROTEINUA Negative 06/06/2025    KETONESUA Negative 06/06/2025    LEUKOCYTESUR Negative  06/06/2025    RBCUA 0-5 06/06/2025    WBCUA 0-5 06/06/2025    BACTERIA Trace (A) 06/06/2025    SQEPUA Trace (A) 06/06/2025    HYALINECASTS None Seen 06/06/2025          Assessment       ICD-10-CM ICD-9-CM   1. Primary hypertension  I10 401.9   2. Hypercholesterolemia  E78.00 272.0   3. Prediabetes  R73.03 790.29   4. Pain in other joint  M25.59 719.48   5. OAB (overactive bladder)  N32.81 596.51   6. BMI 30.0-30.9,adult  Z68.30 V85.30        Plan     1. Primary hypertension  BP elevated. Restart losartan 50. F/u 3-4 wks for BP check and lab review. HR stable. Med refills. DASH diet: Eat more fruits, vegetables, and low fat dairy foods.  (Less than 2 grams of sodium per day).  Maintain healthy weight with goal BMI <30.   Exercise 30 minutes per day 5 days per week.  Home medications refilled and continued.   Home BP monitoring encouraged with BP parameters given.      2. Hypercholesterolemia  Lab Results   Component Value Date    .00 (H) 06/06/2025    CHOL 250 (H) 06/06/2025    HDL 65 (H) 06/06/2025    TRIG 94 06/06/2025     Admits to highly processed diet  Will monitor  Take OTC Fish oil/[place] 3/DHA EPA daily.   Stressed importance of dietary modifications. Follow a low cholesterol, low saturated fat diet with less that 200mg of cholesterol a day.  Avoid fried foods and high saturated fats (high saturated fats less than 7% of calories).  Add Flax Seed/Fish Oil supplements to diet. Increase dietary fiber.  Regular exercise can reduce LDL and raise HDL. Stressed importance of physical activity 5 times per week for 30 minutes per day.      3. Prediabetes  Lab Results   Component Value Date    HGBA1C 6.0 06/06/2025     Prediabetes is reversible. Close follow up is important.  Maintain healthy weight or lose weight.   Eat fewer refined carbohydrates and fats, and more fiber.  Read nutrition labels.  Reduce portion sizes.  Eat out less often. Avoid fast foods.  Drink water and unsweetened beverages.  Spending at  least one hour every day in physical activity.     4. Pain in other joint  Autoimmune Labs ordered  - Sedimentation rate; Future  - C-Reactive Protein; Future  - MENDOZA IgG by IFA; Future  - Rheumatoid Factor IgA; Future  - Rheumatoid Factor IgM; Future  - Rheumatoid Quantitative; Future  - Uric Acid; Future    5. OAB (overactive bladder)  RX ditropan refilled  UA neg    6. BMI 30.0-30.9,adult  Goal BMI <30.  Exercise 5 times a week for 30 minutes per day.  Avoid soda, simple sugars, excessive rice, potatoes or bread. Limit fast foods and fried foods.  Choose complex carbs in moderation (example: green vegetables, beans, oatmeal). Eat plenty of fresh fruits and vegetables with lean meats daily.  Do not skip meals. Eat a balanced portion size.  Avoid fad diets. Consider permanent healthy life style changes.          Current Outpatient Medications   Medication Instructions    fluticasone propionate (FLONASE) 100 mcg, Each Nostril, 2 times daily PRN    ibuprofen (ADVIL,MOTRIN) 800 mg, Oral, 3 times daily    losartan (COZAAR) 50 mg, Oral, Daily    meloxicam (MOBIC) 7.5 mg, Oral, Daily PRN    oxybutynin (DITROPAN-XL) 5 mg, Oral, Daily    traZODone (DESYREL) 50 mg, Oral, Nightly PRN       Orders Placed This Encounter   Procedures    Sedimentation rate    C-Reactive Protein    MENDOZA IgG by IFA    Rheumatoid Factor IgA    Rheumatoid Factor IgM    Rheumatoid Quantitative    Uric Acid         Future Appointments   Date Time Provider Department Center   7/1/2025  1:20 PM Val Gillespie FNP [facility]   8/11/2025  9:30 AM Aishwarya Summers, BINTA [facility]        Follow up in about 4 weeks (around 7/7/2025) for lab review.    Labs thoroughly reviewed with patient. Medication refills addressed today.  RTC prn and 3-4 wks, with labs 1 week prior to the apt.  COVID 19 precautions given to patient.  Patient voices understanding of all discharge instructions.      EDITH Ledesma

## 2025-06-18 ENCOUNTER — HOSPITAL ENCOUNTER (OUTPATIENT)
Dept: RADIOLOGY | Facility: HOSPITAL | Age: 60
Discharge: HOME | End: 2025-06-18
Attending: NURSE PRACTITIONER
Payer: MEDICAID

## 2025-06-18 DIAGNOSIS — Z12.31 VISIT FOR SCREENING MAMMOGRAM: ICD-10-CM

## 2025-06-18 PROCEDURE — 77063 BREAST TOMOSYNTHESIS BI: CPT | Mod: 26,,, | Performed by: RADIOLOGY

## 2025-06-18 PROCEDURE — 77067 SCR MAMMO BI INCL CAD: CPT | Mod: 26,,, | Performed by: RADIOLOGY

## 2025-06-18 PROCEDURE — 77067 SCR MAMMO BI INCL CAD: CPT | Mod: TC

## 2025-07-01 ENCOUNTER — OFFICE VISIT (OUTPATIENT)
Dept: INTERNAL MEDICINE | Facility: CLINIC | Age: 60
End: 2025-07-01
Payer: MEDICAID

## 2025-07-01 ENCOUNTER — HOSPITAL ENCOUNTER (OUTPATIENT)
Dept: RADIOLOGY | Facility: HOSPITAL | Age: 60
Discharge: HOME | End: 2025-07-01
Attending: NURSE PRACTITIONER
Payer: MEDICAID

## 2025-07-01 VITALS
SYSTOLIC BLOOD PRESSURE: 137 MMHG | HEIGHT: 62 IN | DIASTOLIC BLOOD PRESSURE: 78 MMHG | BODY MASS INDEX: 31.74 KG/M2 | HEART RATE: 84 BPM | WEIGHT: 172.5 LBS | OXYGEN SATURATION: 99 % | RESPIRATION RATE: 18 BRPM

## 2025-07-01 DIAGNOSIS — E78.00 HYPERCHOLESTEROLEMIA: ICD-10-CM

## 2025-07-01 DIAGNOSIS — J30.89 ALLERGIC RHINITIS DUE TO OTHER ALLERGIC TRIGGER, UNSPECIFIED SEASONALITY: ICD-10-CM

## 2025-07-01 DIAGNOSIS — G47.00 INSOMNIA, UNSPECIFIED TYPE: ICD-10-CM

## 2025-07-01 DIAGNOSIS — I10 PRIMARY HYPERTENSION: ICD-10-CM

## 2025-07-01 DIAGNOSIS — M25.552 LEFT HIP PAIN: ICD-10-CM

## 2025-07-01 DIAGNOSIS — R73.03 PREDIABETES: ICD-10-CM

## 2025-07-01 DIAGNOSIS — R53.83 FATIGUE, UNSPECIFIED TYPE: ICD-10-CM

## 2025-07-01 PROCEDURE — 99213 OFFICE O/P EST LOW 20 MIN: CPT | Mod: PBBFAC,25 | Performed by: NURSE PRACTITIONER

## 2025-07-01 PROCEDURE — 99214 OFFICE O/P EST MOD 30 MIN: CPT | Mod: S$PBB,,, | Performed by: NURSE PRACTITIONER

## 2025-07-01 PROCEDURE — 3078F DIAST BP <80 MM HG: CPT | Mod: CPTII,,, | Performed by: NURSE PRACTITIONER

## 2025-07-01 PROCEDURE — 4010F ACE/ARB THERAPY RXD/TAKEN: CPT | Mod: CPTII,,, | Performed by: NURSE PRACTITIONER

## 2025-07-01 PROCEDURE — 1159F MED LIST DOCD IN RCRD: CPT | Mod: CPTII,,, | Performed by: NURSE PRACTITIONER

## 2025-07-01 PROCEDURE — 3044F HG A1C LEVEL LT 7.0%: CPT | Mod: CPTII,,, | Performed by: NURSE PRACTITIONER

## 2025-07-01 PROCEDURE — 3075F SYST BP GE 130 - 139MM HG: CPT | Mod: CPTII,,, | Performed by: NURSE PRACTITIONER

## 2025-07-01 PROCEDURE — 3008F BODY MASS INDEX DOCD: CPT | Mod: CPTII,,, | Performed by: NURSE PRACTITIONER

## 2025-07-01 PROCEDURE — 1160F RVW MEDS BY RX/DR IN RCRD: CPT | Mod: CPTII,,, | Performed by: NURSE PRACTITIONER

## 2025-07-01 PROCEDURE — 73502 X-RAY EXAM HIP UNI 2-3 VIEWS: CPT | Mod: TC,LT

## 2025-07-01 RX ORDER — DULOXETIN HYDROCHLORIDE 30 MG/1
30 CAPSULE, DELAYED RELEASE ORAL DAILY
Qty: 30 CAPSULE | Refills: 3 | Status: SHIPPED | OUTPATIENT
Start: 2025-07-01 | End: 2026-07-01

## 2025-07-01 RX ORDER — LEVOCETIRIZINE DIHYDROCHLORIDE 5 MG/1
5 TABLET, FILM COATED ORAL NIGHTLY
Qty: 30 TABLET | Refills: 3 | Status: SHIPPED | OUTPATIENT
Start: 2025-07-01 | End: 2026-07-01

## 2025-07-01 RX ORDER — LOSARTAN POTASSIUM 50 MG/1
50 TABLET ORAL DAILY
Qty: 90 TABLET | Refills: 1 | Status: SHIPPED | OUTPATIENT
Start: 2025-07-01

## 2025-07-01 RX ORDER — FLUTICASONE PROPIONATE 50 MCG
2 SPRAY, SUSPENSION (ML) NASAL 2 TIMES DAILY PRN
Qty: 16 G | Refills: 3 | Status: SHIPPED | OUTPATIENT
Start: 2025-07-01

## 2025-07-01 NOTE — PROGRESS NOTES
[facility]    Patient ID: [id number]     Chief Complaint: Hypertension (Pt present for BP f/u) and Bumps (Pt c/o bumps appearing on chest and abd ongoing for 2 months with scarring)      HPI:     Michelle Foote is a 59 y.o. female here today for a follow up.     History of Present Illness    HPI:  Patient reports palpitations for the past few months, occurring at rest and during various activities. She has not worn a Holter monitor before. She complains of severe sinus congestion, particularly bad over the past week, with constant headaches, difficulty breathing through her nose, and waking up with a sore throat due to mouth breathing. She reports frequent sneezing and significant congestion yesterday, making it difficult to talk. She has been using generic Claritin or Zyrtec and Flonase without significant relief.    Patient feels constantly fatigued, despite normal lab results for anemia and autoimmune conditions. She has constant left hip pain and back pain, ongoing for years. By lunchtime, regardless of food intake, she feels extremely fatigued and struggles to complete her daily tasks. She works as a  and finds it difficult to walk by the end of the day due to pain.    Patient had a sleep study a few years ago, which showed borderline results for sleep apnea. She has a history of back surgery at L4 and L5 and reports constant back pain. She also has knee pain.    Patient takes trazodone almost nightly for sleep, which has been effective. She has a history of kidney stones, having passed 8 stones 2 years ago, requiring surgery in [place]. 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    Patient describes a recurrent rash on her chest and abdomen that leaves scars. The rashes occasionally form small pustules but are not currently active. She recently had significant bruising on her arm that lasted for an extended period, wrapping  around her arm, though she does not recall injuring herself.    Patient reports vertigo and feeling off-balance, leading to falls in the past. She uses an acupressure pin for vertigo management.    Patient denies chest pain or shortness of breath.    TEST RESULTS:  Patient's Vitamin D level is 65, which is within normal range. Her A1C is 6.0, indicating pre-diabetes, up from 5.8 in 2023. Urinalysis was negative for blood and bacteria. STD screening for hepatitis, HIV, and syphilis were all negative. Thyroid and liver function tests were normal. Uric acid test for gout was normal. Kidney function tests were normal. Inflammatory markers were negative. Anemia test was normal. Autoimmune tests for lupus and other autoimmune diseases were negative. Rheumatoid factor was normal. Patient underwent a sleep study a few years ago, which showed borderline results.    IMAGING:  A mammogram performed on 6/19/2025 showed BIRADS 1 (normal) results. A back X-ray taken 1 year ago revealed post-surgical changes at L4 and L5.          Patient Active Problem List   Diagnosis    Kidney stone    Ureteral colic    Hypercholesterolemia    Hypertension    Hip pain, acute, left    Insomnia    Prediabetes        Past Surgical History:   Procedure Laterality Date    BACK SURGERY      COLONOSCOPY  03/28/2023    Timmy Epps MD    CYSTOSCOPY W/ URETERAL STENT PLACEMENT N/A 10/14/2022    Procedure: CYSTOSCOPY, WITH URETERAL STENT INSERTION;  Surgeon: Anderson Nova MD;  Location: [facility];  Service: Urology;  Laterality: N/A;    HYSTERECTOMY      SINUS SURGERY          Social History     Tobacco Use    Smoking status: Never    Smokeless tobacco: Never   Substance and Sexual Activity    Alcohol use: Yes     Alcohol/week: 2.0 standard drinks of alcohol     Types: 1 Glasses of wine, 1 Cans of beer per week     Comment: social drink    Drug use: Never    Sexual activity: Not Currently        Current Outpatient Medications   Medication Instructions     "DULoxetine (CYMBALTA) 30 mg, Oral, Daily    fluticasone propionate (FLONASE) 100 mcg, Each Nostril, 2 times daily PRN    ibuprofen (ADVIL,MOTRIN) 800 mg, Oral, 3 times daily    levocetirizine (XYZAL) 5 mg, Oral, Nightly    losartan (COZAAR) 50 mg, Oral, Daily    oxybutynin (DITROPAN-XL) 5 mg, Oral, Daily    traZODone (DESYREL) 50 mg, Oral, Nightly PRN       Review of patient's allergies indicates:   Allergen Reactions    Rifampin Hives        Patient Care Team:  Val Gillespie FNP as PCP - General (Family Medicine)     Subjective:     Review of Systems    12 point review of systems conducted, negative except as stated in the history of present illness. See HPI for details.    Objective:     BP Readings from Last 6 Encounters:   07/01/25 137/78   06/09/25 (!) 158/72   02/21/25 (!) 183/85   01/06/25 (!) 173/96   10/24/24 (!) 172/87   07/18/24 (!) 157/74     Pulse Readings from Last 6 Encounters:   07/01/25 84   06/09/25 72   02/21/25 82   01/06/25 92   10/24/24 77   07/18/24 60     Wt Readings from Last 6 Encounters:   07/01/25 78.2 kg (172 lb 8 oz)   06/09/25 76.6 kg (168 lb 12.8 oz)   02/21/25 77 kg (169 lb 12.1 oz)   01/06/25 75.1 kg (165 lb 9.1 oz)   10/24/24 78.8 kg (173 lb 11.6 oz)   07/18/24 74.7 kg (164 lb 9.6 oz)      BMI Readings from Last 6 Encounters:   07/01/25 31.55 kg/m²   06/09/25 30.87 kg/m²   02/21/25 31.05 kg/m²   01/06/25 30.28 kg/m²   10/24/24 31.77 kg/m²   07/18/24 30.11 kg/m²        Visit Vitals  /78 (BP Location: Right arm, Patient Position: Sitting)   Pulse 84   Resp 18   Ht 5' 2" (1.575 m)   Wt 78.2 kg (172 lb 8 oz)   SpO2 99%   BMI 31.55 kg/m²       Physical Exam    Physical Exam    Vitals: Blood pressure: 137/78. Heart rate: 84.  General: No acute distress. Well-developed. Well-nourished.  Eyes: EOMI. Sclerae anicteric.  HENT: Normocephalic. Atraumatic. Nares patent. Moist oral mucosa.  Ears: Bilateral TMs clear. Bilateral EACs clear.  Cardiovascular: Regular rate. Regular rhythm. " No murmurs. No rubs. No gallops. Normal S1, S2.  Respiratory: Normal respiratory effort. Clear to auscultation bilaterally. No rales. No rhonchi. No wheezing.  Abdomen: Soft. Non-tender. Non-distended. Normoactive bowel sounds.  Musculoskeletal: No  obvious deformity.  Extremities: No lower extremity edema.  Neurological: Alert & oriented x3. No slurred speech. Normal gait.  Psychiatric: Normal mood. Normal affect. Good insight. Good judgment.  Skin: Warm. Dry. No rash.         Recent Results:     Chemistry:  Lab Results   Component Value Date     06/06/2025    K 4.4 06/06/2025    CHLORIDE 106 11/03/2022    BUN 19.2 06/06/2025    CREATININE 0.72 06/06/2025    EGFRNORACEVR >60 06/06/2025    GLUCOSE 98 11/03/2022    CALCIUM 9.0 06/06/2025    ALKPHOS 55 06/06/2025    ALBUMIN 3.9 06/06/2025    AST 19 06/06/2025    ALT 22 06/06/2025    MG 2.10 10/19/2022    PHOS 3.6 10/18/2022    [id number] 65 06/06/2025    TSH 1.247 06/06/2025        Lab Results   Component Value Date    HGBA1C 6.0 06/06/2025        Hematology:  Lab Results   Component Value Date    WBC 6.13 06/06/2025    HGB 12.3 06/06/2025    HCT 39.6 06/06/2025     06/06/2025       Lipid Panel:  Lab Results   Component Value Date    CHOL 250 (H) 06/06/2025    HDL 65 (H) 06/06/2025    .00 (H) 06/06/2025    TRIG 94 06/06/2025    TOTALCHOLEST 4 06/06/2025        Urine:  Lab Results   Component Value Date    APPEARANCEUA Clear 06/06/2025    SGUA 1.018 06/06/2025    PROTEINUA Negative 06/06/2025    KETONESUA Negative 06/06/2025    LEUKOCYTESUR Negative 06/06/2025    RBCUA 0-5 06/06/2025    WBCUA 0-5 06/06/2025    BACTERIA Trace (A) 06/06/2025    SQEPUA Trace (A) 06/06/2025    HYALINECASTS None Seen 06/06/2025        Assessment/Plan:     Assessment & Plan    PLAN SUMMARY:  - Continue losartan 50 mg daily for blood pressure; provided refill  - Continue trazodone at current dose for sleep management  - Initiate Xyzal to be taken at night  - Continue  Flonase nasal spray  - Recommend Zyrtec as alternative allergy medication if needed  - Order X-ray of left hip to evaluate for osteoarthritis  - Prescribe Cymbalta 30 mg daily for fatigue potentially related to anxiety  - Continue OTC Vitamin D 2000 IU daily  - Refer to dermatologist for rash evaluation  - Advise 150 minutes of exercise per week  - Recommend increasing clear liquid intake and avoiding caffeine  - Suggest reducing intake of fast food, fried food, and processed foods  - Recommend increasing consumption of whole foods, fruits, and vegetables  - Follow-up in 6 weeks to review X-ray results and medication effectiveness, with option for virtual appointment    ESSENTIAL HYPERTENSION:  - Blood pressure 137/78 and heart rate 84, both within normal limits.  - Continued losartan 50 mg daily for blood pressure management and provided refill.    PREDIABETES:  - A1C level at 6.0 (pre-diabetic range), increased from 5.8 in 2023.  - Discussed importance of monitoring sugar and starch intake.  - Advised patient to limit sugar and starches while consuming more whole foods, fruits, and vegetables.  - Assessed chronic fatigue as potentially related to pre-diabetic status and insulin resistance.    CHRONIC SINUSITIS:  - Patient reports constant headaches, congestion, and sinus problems, worsened over the past week.  - Initiated Xyzal to be taken at night and continued Flonase nasal spray.  - Recommend Zyrtec as an alternative allergy medication if needed.    SLEEP-RELATED BREATHING DIFFICULTIES:  - Patient reports waking up with sore throat and difficulty breathing due to congestion.  - Continued trazodone at current dose for sleep management.  - Advised sleeping in an elevated position to help with breathing issues.    CONGENITAL URETHRAL MALFORMATION:  - Monitored patient's condition of having four urethras, with one causing issues with stone formation.  - Advised to increase intake of clear liquids and avoid caffeine  to prevent stone formation.    POST-LAMINECTOMY SYNDROME:  - Patient reports constant back pain following surgery at L4 and L5.  - Evaluated post-surgical pain patterns and functional limitations.    LEFT HIP PAIN AND OSTEOARTHRITIS:  - Patient reports left hip pain worsening by end of day, with morning limping.  - Ordered X-ray of left hip to evaluate for osteoarthritis.  - Discussed that osteoarthritis is a clinical diagnosis not detectable through labs, potentially genetic or lifestyle-related, and common with aging.  - Normal inflammatory markers support osteoarthritis as potential cause.  - Patient may qualify for injection if arthritis is confirmed.    VERTIGO:  - Patient reports feeling off balance and experiencing vertigo.  - Uses a pin for vertigo to help with balance and dizziness issues.    PALPITATIONS:  - Patient reports heart flutt  ers at rest or anytime, ongoing for past few months.  - Explained potential link between caffeine consumption and palpitations.  - Advised reducing caffeine intake to help manage symptoms.    SKIN CONDITION (PRURIGO NODULARIS):  - Patient reports rash with pustules that leave scars, though no active pustules currently.  - Antibiotic treatment not required due to lack of active lesions.  - No immediate concern regarding bruising.  - Referred to dermatologist for further evaluation.    CHRONIC FATIGUE:  - Patient reports feeling tired all the time, especially around lunchtime, and exhaustion even after minimal activity.  - Previous autoimmune tests for lupus and rheumatoid factor were negative, and anemia test was normal.  - Prescribed Cymbalta 30 mg daily to address fatigue potentially related to anxiety.    HISTORY OF URINARY CALCULI:  - History of urinary stones with surgery over 2 years ago.  - No current problems confirmed, with normal urination and absence of ureteral stent.    HISTORY OF FALLING:  - Patient reports feeling off balance and having fallen from the  attic.    GENERAL HEALTH RECOMMENDATIONS:  - Normal results for autoimmune, thyroid, liver, and renal function tests.  - Recommend reducing intake of fast food, fried food, and processed foods while increasing whole foods, fruits, and vegetables.  - Advised 150 minutes of exercise per week.  - Continued OTC Vitamin D 2000 IU daily.    FOLLOW-UP:  - Scheduled follow up in 6 weeks to review X-ray results and medication effectiveness, with option for virtual appointment.          Orders Placed This Encounter    DULoxetine (CYMBALTA) 30 MG capsule     Sig: Take 1 capsule (30 mg total) by mouth once daily.     Dispense:  30 capsule     Refill:  3    fluticasone propionate (FLONASE) 50 mcg/actuation nasal spray     Si sprays (100 mcg total) by Each Nostril route 2 (two) times daily as needed for Rhinitis.     Dispense:  16 g     Refill:  3    levocetirizine (XYZAL) 5 MG tablet     Sig: Take 1 tablet (5 mg total) by mouth every evening.     Dispense:  30 tablet     Refill:  3    losartan (COZAAR) 50 MG tablet     Sig: Take 1 tablet (50 mg total) by mouth once daily.     Dispense:  90 tablet     Refill:  1     .    X-Ray Hip 2 or 3 views Left with Pelvis when performed     Standing Status:   Future     Number of Occurrences:   1     Expected Date:   2025     Expiration Date:   2026     May the Radiologist modify the order per protocol to meet the clinical needs of the patient?:   Yes     Release to patient:   Immediate       Follow up in about 6 weeks (around 2025) for imaging/test results. In addition to their scheduled follow up, the patient has also been instructed to follow up on as needed basis.     Future Appointments   Date Time Provider Department Center   2025  9:30 AM Aishwarya Summers ANP [facility] GYN [place] [facility]   2025  2:40 PM Val Gillespie FNP [facility] INTMED [place]    2026  2:00 PM [facility] [id number] [facility] MAMMO [place]        EDITH Ledesma    This note was generated with  the assistance of ambient listening technology. Verbal consent was obtained by the patient and accompanying visitor(s) for the recording of patient appointment to facilitate this note. I attest to having reviewed and edited the generated note for accuracy, though some syntax or spelling errors may persist. Please contact the author of this note for any clarification.

## (undated) DEVICE — ADAPTER STOPCOCK FEMALE LL

## (undated) DEVICE — SUPPORT ULNA NERVE PROTECTOR

## (undated) DEVICE — KIT SURGICAL TURNOVER

## (undated) DEVICE — GLOVE PROTEXIS BLUE LATEX 8

## (undated) DEVICE — SOL IRRIGATION WATER 3000ML

## (undated) DEVICE — SENSOR DUAL FLEX STR 150CM

## (undated) DEVICE — GLOVE PROTEXIS HYDROGEL SZ7.5

## (undated) DEVICE — POSITIONER HEAD ADULT

## (undated) DEVICE — SYR 10CC LUER LOCK

## (undated) DEVICE — Device

## (undated) DEVICE — CATH POLLACK OPEN-END FLEXI-TI

## (undated) DEVICE — BOWL STERILE LARGE 32OZ

## (undated) DEVICE — BAG DRAIN UROLOGY AND HOSE

## (undated) DEVICE — MARKER WRITESITE SKIN CHLRAPRP

## (undated) DEVICE — TRAY SKIN SCRUB WET PREMIUM

## (undated) DEVICE — SOL IRRI STRL WATER 1000ML

## (undated) DEVICE — SYR 30CC LUER LOCK